# Patient Record
Sex: MALE | Race: WHITE | Employment: OTHER | URBAN - METROPOLITAN AREA
[De-identification: names, ages, dates, MRNs, and addresses within clinical notes are randomized per-mention and may not be internally consistent; named-entity substitution may affect disease eponyms.]

---

## 2017-01-13 RX ORDER — LEVOTHYROXINE SODIUM 112 UG/1
TABLET ORAL
Qty: 90 TABLET | Refills: 3 | Status: SHIPPED | OUTPATIENT
Start: 2017-01-13 | End: 2018-01-12 | Stop reason: SDUPTHER

## 2017-03-06 ENCOUNTER — OFFICE VISIT (OUTPATIENT)
Dept: FAMILY MEDICINE CLINIC | Age: 67
End: 2017-03-06

## 2017-03-06 VITALS
BODY MASS INDEX: 26.88 KG/M2 | WEIGHT: 187.8 LBS | DIASTOLIC BLOOD PRESSURE: 66 MMHG | SYSTOLIC BLOOD PRESSURE: 138 MMHG | HEART RATE: 76 BPM | HEIGHT: 70 IN | TEMPERATURE: 98.8 F | RESPIRATION RATE: 16 BRPM

## 2017-03-06 DIAGNOSIS — M71.121 SEPTIC OLECRANON BURSITIS, RIGHT: Primary | ICD-10-CM

## 2017-03-06 PROCEDURE — G8427 DOCREV CUR MEDS BY ELIG CLIN: HCPCS | Performed by: FAMILY MEDICINE

## 2017-03-06 PROCEDURE — 1123F ACP DISCUSS/DSCN MKR DOCD: CPT | Performed by: FAMILY MEDICINE

## 2017-03-06 PROCEDURE — 4040F PNEUMOC VAC/ADMIN/RCVD: CPT | Performed by: FAMILY MEDICINE

## 2017-03-06 PROCEDURE — 99213 OFFICE O/P EST LOW 20 MIN: CPT | Performed by: FAMILY MEDICINE

## 2017-03-06 PROCEDURE — 1036F TOBACCO NON-USER: CPT | Performed by: FAMILY MEDICINE

## 2017-03-06 PROCEDURE — G8484 FLU IMMUNIZE NO ADMIN: HCPCS | Performed by: FAMILY MEDICINE

## 2017-03-06 PROCEDURE — 3017F COLORECTAL CA SCREEN DOC REV: CPT | Performed by: FAMILY MEDICINE

## 2017-03-06 PROCEDURE — G8420 CALC BMI NORM PARAMETERS: HCPCS | Performed by: FAMILY MEDICINE

## 2017-03-06 RX ORDER — SULFAMETHOXAZOLE AND TRIMETHOPRIM 800; 160 MG/1; MG/1
1 TABLET ORAL 2 TIMES DAILY
COMMUNITY
Start: 2017-02-27 | End: 2017-03-07

## 2017-03-06 RX ORDER — CEPHALEXIN 500 MG/1
500 CAPSULE ORAL 3 TIMES DAILY
COMMUNITY
Start: 2017-02-27 | End: 2017-03-07

## 2017-03-06 RX ORDER — CLINDAMYCIN HYDROCHLORIDE 300 MG/1
300 CAPSULE ORAL 3 TIMES DAILY
Qty: 30 CAPSULE | Refills: 0 | Status: SHIPPED | OUTPATIENT
Start: 2017-03-06 | End: 2017-03-16

## 2017-04-17 RX ORDER — ATORVASTATIN CALCIUM 80 MG/1
TABLET, FILM COATED ORAL
Qty: 90 TABLET | Refills: 3 | Status: SHIPPED | OUTPATIENT
Start: 2017-04-17 | End: 2018-04-19 | Stop reason: SDUPTHER

## 2017-05-23 LAB
ALBUMIN SERPL-MCNC: 3.4 G/DL
ALP BLD-CCNC: 67 U/L
ALT SERPL-CCNC: 38 U/L
AST SERPL-CCNC: 25 U/L
BILIRUB SERPL-MCNC: 0.6 MG/DL (ref 0.1–1.4)
BUN BLDV-MCNC: 15 MG/DL
CALCIUM SERPL-MCNC: 9 MG/DL
CHLORIDE BLD-SCNC: 105 MMOL/L
CO2: 25 MMOL/L
CREAT SERPL-MCNC: 1 MG/DL
GFR CALCULATED: 75
GLUCOSE BLD-MCNC: 79 MG/DL
POTASSIUM SERPL-SCNC: 4.2 MMOL/L
PROSTATE SPECIFIC ANTIGEN: 2.6 NG/ML
SODIUM BLD-SCNC: 140 MMOL/L
TOTAL PROTEIN: 7.8
TSH SERPL DL<=0.05 MIU/L-ACNC: 1.91 UIU/ML

## 2017-07-10 ENCOUNTER — OFFICE VISIT (OUTPATIENT)
Dept: FAMILY MEDICINE CLINIC | Age: 67
End: 2017-07-10

## 2017-07-10 VITALS
HEIGHT: 70 IN | SYSTOLIC BLOOD PRESSURE: 124 MMHG | RESPIRATION RATE: 14 BRPM | WEIGHT: 188 LBS | BODY MASS INDEX: 26.92 KG/M2 | HEART RATE: 66 BPM | TEMPERATURE: 97.6 F | DIASTOLIC BLOOD PRESSURE: 78 MMHG

## 2017-07-10 DIAGNOSIS — E78.2 MIXED HYPERLIPIDEMIA: ICD-10-CM

## 2017-07-10 DIAGNOSIS — R13.14 PHARYNGOESOPHAGEAL DYSPHAGIA: Primary | ICD-10-CM

## 2017-07-10 DIAGNOSIS — L23.9 ALLERGIC DERMATITIS: ICD-10-CM

## 2017-07-10 DIAGNOSIS — E03.9 ACQUIRED HYPOTHYROIDISM: ICD-10-CM

## 2017-07-10 PROCEDURE — 4040F PNEUMOC VAC/ADMIN/RCVD: CPT | Performed by: FAMILY MEDICINE

## 2017-07-10 PROCEDURE — 99213 OFFICE O/P EST LOW 20 MIN: CPT | Performed by: FAMILY MEDICINE

## 2017-07-10 PROCEDURE — 1123F ACP DISCUSS/DSCN MKR DOCD: CPT | Performed by: FAMILY MEDICINE

## 2017-07-10 PROCEDURE — 3017F COLORECTAL CA SCREEN DOC REV: CPT | Performed by: FAMILY MEDICINE

## 2017-07-10 PROCEDURE — 1036F TOBACCO NON-USER: CPT | Performed by: FAMILY MEDICINE

## 2017-07-10 PROCEDURE — G8427 DOCREV CUR MEDS BY ELIG CLIN: HCPCS | Performed by: FAMILY MEDICINE

## 2017-07-10 PROCEDURE — G8419 CALC BMI OUT NRM PARAM NOF/U: HCPCS | Performed by: FAMILY MEDICINE

## 2017-07-10 RX ORDER — TRIAMCINOLONE ACETONIDE 40 MG/ML
80 INJECTION, SUSPENSION INTRA-ARTICULAR; INTRAMUSCULAR ONCE
Status: COMPLETED | OUTPATIENT
Start: 2017-07-10 | End: 2017-07-10

## 2017-07-10 RX ORDER — DESOXIMETASONE 2.5 MG/G
CREAM TOPICAL
Qty: 100 G | Refills: 2 | Status: SHIPPED | OUTPATIENT
Start: 2017-07-10 | End: 2018-03-21 | Stop reason: SDUPTHER

## 2017-07-10 RX ADMIN — TRIAMCINOLONE ACETONIDE 80 MG: 40 INJECTION, SUSPENSION INTRA-ARTICULAR; INTRAMUSCULAR at 09:51

## 2017-07-13 DIAGNOSIS — E78.2 MIXED HYPERLIPIDEMIA: ICD-10-CM

## 2017-07-13 LAB
CHOLESTEROL, TOTAL: 166 MG/DL (ref 0–199)
HDLC SERPL-MCNC: 44 MG/DL (ref 40–59)
LDL CHOLESTEROL CALCULATED: 110 MG/DL (ref 0–129)
TRIGL SERPL-MCNC: 60 MG/DL (ref 0–200)

## 2017-07-21 ENCOUNTER — OFFICE VISIT (OUTPATIENT)
Dept: GASTROENTEROLOGY | Age: 67
End: 2017-07-21

## 2017-07-21 VITALS
OXYGEN SATURATION: 98 % | RESPIRATION RATE: 16 BRPM | HEART RATE: 69 BPM | HEIGHT: 70 IN | WEIGHT: 184.4 LBS | SYSTOLIC BLOOD PRESSURE: 148 MMHG | TEMPERATURE: 97.9 F | BODY MASS INDEX: 26.4 KG/M2 | DIASTOLIC BLOOD PRESSURE: 98 MMHG

## 2017-07-21 DIAGNOSIS — R13.14 PHARYNGOESOPHAGEAL DYSPHAGIA: Primary | ICD-10-CM

## 2017-07-21 DIAGNOSIS — R13.14 PHARYNGOESOPHAGEAL DYSPHAGIA: ICD-10-CM

## 2017-07-21 LAB
BASOPHILS ABSOLUTE: 0 K/UL (ref 0–0.2)
BASOPHILS RELATIVE PERCENT: 0.4 %
EOSINOPHILS ABSOLUTE: 0 K/UL (ref 0–0.7)
EOSINOPHILS RELATIVE PERCENT: 0.6 %
HCT VFR BLD CALC: 44.2 % (ref 42–52)
HEMOGLOBIN: 14.6 G/DL (ref 14–18)
LYMPHOCYTES ABSOLUTE: 1.6 K/UL (ref 1–4.8)
LYMPHOCYTES RELATIVE PERCENT: 21.2 %
MCH RBC QN AUTO: 31.6 PG (ref 27–31.3)
MCHC RBC AUTO-ENTMCNC: 33.1 % (ref 33–37)
MCV RBC AUTO: 95.3 FL (ref 80–100)
MONOCYTES ABSOLUTE: 1.1 K/UL (ref 0.2–0.8)
MONOCYTES RELATIVE PERCENT: 14.5 %
NEUTROPHILS ABSOLUTE: 4.7 K/UL (ref 1.4–6.5)
NEUTROPHILS RELATIVE PERCENT: 63.3 %
PDW BLD-RTO: 14.2 % (ref 11.5–14.5)
PLATELET # BLD: 229 K/UL (ref 130–400)
RBC # BLD: 4.64 M/UL (ref 4.7–6.1)
WBC # BLD: 7.4 K/UL (ref 4.8–10.8)

## 2017-07-21 PROCEDURE — 99204 OFFICE O/P NEW MOD 45 MIN: CPT | Performed by: INTERNAL MEDICINE

## 2017-07-21 PROCEDURE — 4040F PNEUMOC VAC/ADMIN/RCVD: CPT | Performed by: INTERNAL MEDICINE

## 2017-07-21 PROCEDURE — G8427 DOCREV CUR MEDS BY ELIG CLIN: HCPCS | Performed by: INTERNAL MEDICINE

## 2017-07-21 PROCEDURE — G8419 CALC BMI OUT NRM PARAM NOF/U: HCPCS | Performed by: INTERNAL MEDICINE

## 2017-07-21 PROCEDURE — 1123F ACP DISCUSS/DSCN MKR DOCD: CPT | Performed by: INTERNAL MEDICINE

## 2017-07-21 PROCEDURE — 3017F COLORECTAL CA SCREEN DOC REV: CPT | Performed by: INTERNAL MEDICINE

## 2017-07-21 PROCEDURE — 1036F TOBACCO NON-USER: CPT | Performed by: INTERNAL MEDICINE

## 2017-07-21 ASSESSMENT — ENCOUNTER SYMPTOMS
ABDOMINAL PAIN: 0
DIARRHEA: 0
RESPIRATORY NEGATIVE: 1
VOMITING: 0
EYES NEGATIVE: 1
CONSTIPATION: 0
RECTAL PAIN: 0
NAUSEA: 0
ANAL BLEEDING: 0
BLOOD IN STOOL: 0
ABDOMINAL DISTENTION: 0

## 2017-07-26 ENCOUNTER — OUTSIDE SERVICES (OUTPATIENT)
Dept: GASTROENTEROLOGY | Age: 67
End: 2017-07-26

## 2017-07-26 PROCEDURE — 43239 EGD BIOPSY SINGLE/MULTIPLE: CPT | Performed by: INTERNAL MEDICINE

## 2017-09-20 LAB — PATHOLOGY REPORT: NORMAL

## 2018-01-19 ENCOUNTER — NURSE ONLY (OUTPATIENT)
Dept: FAMILY MEDICINE CLINIC | Age: 68
End: 2018-01-19

## 2018-01-19 DIAGNOSIS — Z23 NEED FOR TDAP VACCINATION: Primary | ICD-10-CM

## 2018-01-19 PROCEDURE — 90715 TDAP VACCINE 7 YRS/> IM: CPT | Performed by: FAMILY MEDICINE

## 2018-01-19 PROCEDURE — 90471 IMMUNIZATION ADMIN: CPT | Performed by: FAMILY MEDICINE

## 2018-01-19 ASSESSMENT — PATIENT HEALTH QUESTIONNAIRE - PHQ9
2. FEELING DOWN, DEPRESSED OR HOPELESS: 0
1. LITTLE INTEREST OR PLEASURE IN DOING THINGS: 0
SUM OF ALL RESPONSES TO PHQ9 QUESTIONS 1 & 2: 0
SUM OF ALL RESPONSES TO PHQ QUESTIONS 1-9: 0

## 2018-03-21 ENCOUNTER — OFFICE VISIT (OUTPATIENT)
Dept: FAMILY MEDICINE CLINIC | Age: 68
End: 2018-03-21
Payer: MEDICARE

## 2018-03-21 VITALS
RESPIRATION RATE: 12 BRPM | TEMPERATURE: 97.7 F | HEIGHT: 70 IN | WEIGHT: 176 LBS | SYSTOLIC BLOOD PRESSURE: 126 MMHG | BODY MASS INDEX: 25.2 KG/M2 | HEART RATE: 72 BPM | DIASTOLIC BLOOD PRESSURE: 84 MMHG

## 2018-03-21 DIAGNOSIS — L30.9 DERMATITIS: ICD-10-CM

## 2018-03-21 DIAGNOSIS — M19.022 PRIMARY OSTEOARTHRITIS OF BOTH ELBOWS: Primary | ICD-10-CM

## 2018-03-21 DIAGNOSIS — L85.3 XEROSIS CUTIS: ICD-10-CM

## 2018-03-21 DIAGNOSIS — Q80.9 ICHTHYOSIS: ICD-10-CM

## 2018-03-21 DIAGNOSIS — M19.021 PRIMARY OSTEOARTHRITIS OF BOTH ELBOWS: Primary | ICD-10-CM

## 2018-03-21 PROCEDURE — G8419 CALC BMI OUT NRM PARAM NOF/U: HCPCS | Performed by: FAMILY MEDICINE

## 2018-03-21 PROCEDURE — 4040F PNEUMOC VAC/ADMIN/RCVD: CPT | Performed by: FAMILY MEDICINE

## 2018-03-21 PROCEDURE — 1123F ACP DISCUSS/DSCN MKR DOCD: CPT | Performed by: FAMILY MEDICINE

## 2018-03-21 PROCEDURE — G8427 DOCREV CUR MEDS BY ELIG CLIN: HCPCS | Performed by: FAMILY MEDICINE

## 2018-03-21 PROCEDURE — G8484 FLU IMMUNIZE NO ADMIN: HCPCS | Performed by: FAMILY MEDICINE

## 2018-03-21 PROCEDURE — 1036F TOBACCO NON-USER: CPT | Performed by: FAMILY MEDICINE

## 2018-03-21 PROCEDURE — 3017F COLORECTAL CA SCREEN DOC REV: CPT | Performed by: FAMILY MEDICINE

## 2018-03-21 PROCEDURE — 99213 OFFICE O/P EST LOW 20 MIN: CPT | Performed by: FAMILY MEDICINE

## 2018-03-21 RX ORDER — TRIAMCINOLONE ACETONIDE 40 MG/ML
80 INJECTION, SUSPENSION INTRA-ARTICULAR; INTRAMUSCULAR ONCE
Status: COMPLETED | OUTPATIENT
Start: 2018-03-21 | End: 2018-03-21

## 2018-03-21 RX ORDER — DESOXIMETASONE 2.5 MG/G
CREAM TOPICAL
Qty: 100 G | Refills: 2 | Status: SHIPPED | OUTPATIENT
Start: 2018-03-21 | End: 2018-04-24 | Stop reason: SDUPTHER

## 2018-03-21 RX ADMIN — TRIAMCINOLONE ACETONIDE 80 MG: 40 INJECTION, SUSPENSION INTRA-ARTICULAR; INTRAMUSCULAR at 08:43

## 2018-04-24 DIAGNOSIS — L30.9 DERMATITIS: ICD-10-CM

## 2018-04-24 RX ORDER — DESOXIMETASONE 2.5 MG/G
CREAM TOPICAL
Qty: 100 G | Refills: 2 | Status: SHIPPED | OUTPATIENT
Start: 2018-04-24 | End: 2018-09-28 | Stop reason: SDUPTHER

## 2018-04-24 RX ORDER — ATORVASTATIN CALCIUM 80 MG/1
40 TABLET, FILM COATED ORAL DAILY
Qty: 90 TABLET | Refills: 3 | Status: SHIPPED | OUTPATIENT
Start: 2018-04-24 | End: 2019-02-19 | Stop reason: SDUPTHER

## 2018-04-24 RX ORDER — LEVOTHYROXINE SODIUM 112 UG/1
112 TABLET ORAL DAILY
Qty: 90 TABLET | Refills: 3 | Status: SHIPPED | OUTPATIENT
Start: 2018-04-24

## 2018-05-22 LAB
BASOPHILS ABSOLUTE: 0.05 /ΜL
BASOPHILS RELATIVE PERCENT: 0.9 %
EOSINOPHILS ABSOLUTE: 0.33 /ΜL
EOSINOPHILS RELATIVE PERCENT: 5.6 %
HCT VFR BLD CALC: 41.9 % (ref 41–53)
HEMOGLOBIN: 14 G/DL (ref 13.5–17.5)
LYMPHOCYTES ABSOLUTE: 1.48 /ΜL
LYMPHOCYTES RELATIVE PERCENT: 25.2 %
MCH RBC QN AUTO: 31.7 PG
MCHC RBC AUTO-ENTMCNC: 33.4 G/DL
MCV RBC AUTO: 94.8 FL
MONOCYTES ABSOLUTE: 0.94 /ΜL
MONOCYTES RELATIVE PERCENT: 16 %
NEUTROPHILS ABSOLUTE: 3.08 /ΜL
NEUTROPHILS RELATIVE PERCENT: 52.3 %
PDW BLD-RTO: 14.3 %
PLATELET # BLD: 268 K/ΜL
PMV BLD AUTO: NORMAL FL
RBC # BLD: 4.42 10^6/ΜL
WBC # BLD: 5.88 10^3/ML

## 2018-05-23 LAB
ALBUMIN SERPL-MCNC: 3.4 G/DL
ALP BLD-CCNC: 62 U/L
ALT SERPL-CCNC: 33 U/L
ANION GAP SERPL CALCULATED.3IONS-SCNC: 11 MMOL/L
AST SERPL-CCNC: 26 U/L
BILIRUB SERPL-MCNC: 0.5 MG/DL (ref 0.1–1.4)
BUN BLDV-MCNC: 20 MG/DL
CALCIUM SERPL-MCNC: 8.9 MG/DL
CHLORIDE BLD-SCNC: 107 MMOL/L
CHOLESTEROL, TOTAL: 184 MG/DL
CHOLESTEROL/HDL RATIO: NORMAL
CO2: 26 MMOL/L
CREAT SERPL-MCNC: 0.9 MG/DL
GFR CALCULATED: 84
GLUCOSE BLD-MCNC: 83 MG/DL
HDLC SERPL-MCNC: 43 MG/DL (ref 35–70)
LDL CHOLESTEROL CALCULATED: 118 MG/DL (ref 0–160)
POTASSIUM SERPL-SCNC: 4 MMOL/L
PROSTATE SPECIFIC ANTIGEN: 2.3 NG/ML
SODIUM BLD-SCNC: 140 MMOL/L
TOTAL PROTEIN: 7.6
TRIGL SERPL-MCNC: 85 MG/DL
TSH SERPL DL<=0.05 MIU/L-ACNC: 2.14 UIU/ML
VLDLC SERPL CALC-MCNC: NORMAL MG/DL

## 2018-09-28 ENCOUNTER — OFFICE VISIT (OUTPATIENT)
Dept: FAMILY MEDICINE CLINIC | Age: 68
End: 2018-09-28
Payer: MEDICARE

## 2018-09-28 VITALS
DIASTOLIC BLOOD PRESSURE: 78 MMHG | RESPIRATION RATE: 12 BRPM | SYSTOLIC BLOOD PRESSURE: 136 MMHG | WEIGHT: 185 LBS | HEIGHT: 70 IN | HEART RATE: 72 BPM | TEMPERATURE: 96.5 F | BODY MASS INDEX: 26.48 KG/M2

## 2018-09-28 DIAGNOSIS — R07.89 OTHER CHEST PAIN: ICD-10-CM

## 2018-09-28 DIAGNOSIS — M79.604 BILATERAL LEG AND FOOT PAIN: ICD-10-CM

## 2018-09-28 DIAGNOSIS — R07.89 CHEST TIGHTNESS OR PRESSURE: Primary | ICD-10-CM

## 2018-09-28 DIAGNOSIS — M79.671 BILATERAL LEG AND FOOT PAIN: ICD-10-CM

## 2018-09-28 DIAGNOSIS — M79.605 BILATERAL LEG AND FOOT PAIN: ICD-10-CM

## 2018-09-28 DIAGNOSIS — R94.31 ABNORMAL EKG: ICD-10-CM

## 2018-09-28 DIAGNOSIS — R20.0 NUMBNESS AND TINGLING OF BOTH FEET: ICD-10-CM

## 2018-09-28 DIAGNOSIS — M79.672 BILATERAL LEG AND FOOT PAIN: ICD-10-CM

## 2018-09-28 DIAGNOSIS — L30.9 DERMATITIS: ICD-10-CM

## 2018-09-28 DIAGNOSIS — R20.2 NUMBNESS AND TINGLING OF BOTH FEET: ICD-10-CM

## 2018-09-28 PROCEDURE — 99214 OFFICE O/P EST MOD 30 MIN: CPT | Performed by: FAMILY MEDICINE

## 2018-09-28 PROCEDURE — 1101F PT FALLS ASSESS-DOCD LE1/YR: CPT | Performed by: FAMILY MEDICINE

## 2018-09-28 PROCEDURE — 3017F COLORECTAL CA SCREEN DOC REV: CPT | Performed by: FAMILY MEDICINE

## 2018-09-28 PROCEDURE — G8427 DOCREV CUR MEDS BY ELIG CLIN: HCPCS | Performed by: FAMILY MEDICINE

## 2018-09-28 PROCEDURE — G8419 CALC BMI OUT NRM PARAM NOF/U: HCPCS | Performed by: FAMILY MEDICINE

## 2018-09-28 PROCEDURE — 1036F TOBACCO NON-USER: CPT | Performed by: FAMILY MEDICINE

## 2018-09-28 PROCEDURE — 93000 ELECTROCARDIOGRAM COMPLETE: CPT | Performed by: FAMILY MEDICINE

## 2018-09-28 PROCEDURE — 4040F PNEUMOC VAC/ADMIN/RCVD: CPT | Performed by: FAMILY MEDICINE

## 2018-09-28 PROCEDURE — 1123F ACP DISCUSS/DSCN MKR DOCD: CPT | Performed by: FAMILY MEDICINE

## 2018-09-28 RX ORDER — DESOXIMETASONE 2.5 MG/G
CREAM TOPICAL
Qty: 100 G | Refills: 2 | Status: SHIPPED | OUTPATIENT
Start: 2018-09-28 | End: 2019-10-29

## 2018-09-28 NOTE — PROGRESS NOTES
Subjective:       Chief Complaint   Patient presents with    Results     f/u EKG    Numbness     b/l numbness/tingling/coldness in toes    Chest Pain      Lam Cottrell is a 70 y.o. male who presents today for FU of Results (f/u EKG); Numbness (b/l numbness/tingling/coldness in toes); and Chest Pain  Patient is here to follow up on hisAbnormal EKG which he had done during screening tests. He reports recurrent chest tightness and pain mainly in the central region of the chest which is nonradiating. The tightness is exertional and with occasional shortness of breath. No jaw pain and no pain in the arms. He denies palpitations, no PND and orthopnea. He has not been diaphoretic and not intense chest pain. Patient complains of toes on both feet getting numbness/tingling/coldness. Symptoms began over 1 year ago. He also complains of bilateral leg pain and coldness of his feet. The symptoms are usually worse at rest but does occur after walking a distance.     Past Medical History:   Diagnosis Date    Eczema     Hyperlipidemia     Hypothyroidism      Patient Active Problem List    Diagnosis Date Noted    Pharyngoesophageal dysphagia     Spondylolisthesis at L5-S1 level 09/09/2013    Degenerative arthritis of lumbar spine 09/09/2013    Basal cell papilloma 08/27/2013    RLS (restless legs syndrome) 05/14/2013    Eczema 03/18/2013    Lipoma 10/23/2012    Hypothyroidism     Hyperlipidemia      Past Surgical History:   Procedure Laterality Date    COLONOSCOPY  10/01/14    DR. Monserrat Little KNEE SURGERY      left    VASECTOMY       Family History   Problem Relation Age of Onset    High Blood Pressure Mother     High Cholesterol Father     Other Father         thyroid    Other Sister         thyroid    Other Brother         throid     Social History     Social History    Marital status:      Spouse name: N/A    Number of children: N/A    Years of education: N/A     Occupational History on exertion  Gastrointestinal ROS: no abdominal pain, change in bowel habits, or black or bloody stools  Genito-Urinary ROS: no dysuria, trouble voiding, or hematuria  Musculoskeletal ROS: negative for - joint pain, joint stiffness, joint swelling, muscle pain or muscular weakness  Neurological ROS: negative for - dizziness, gait disturbance, headaches, impaired coordination/balance, tremors or visual changes  Dermatological ROS: negative for - dry skin, lumps, pruritus or rash        Objective:   Blood pressure 136/78, pulse 72, temperature 96.5 °F (35.8 °C), temperature source Temporal, resp. rate 12, height 5' 10\" (1.778 m), weight 185 lb (83.9 kg). Physical Examination: General appearance - alert, well appearing, and in no distress  Mental status - alert, oriented to person, place, and time  Eyes - pupils equal and reactive, extraocular eye movements intact  Ears - bilateral TM's and external ear canals normal  Mouth - mucous membranes moist, pharynx normal without lesions  Neck - supple, no significant adenopathy  Lymphatics - no palpable lymphadenopathy, no hepatosplenomegaly  Chest - clear to auscultation, no wheezes, rales or rhonchi, symmetric air entry  Heart - normal rate, regular rhythm, normal S1, S2, no murmurs, rubs, clicks or gallops  Abdomen - soft, nontender, nondistended, no masses or organomegaly  Neurological - alert, oriented, normal speech, no focal findings or movement disorder noted  Musculoskeletal - no joint tenderness, deformity or swelling  Extremities: peripheral pulses normal, no pedal edema, no clubbing or cyanosis. ECG: normal sinus rhythm and inferior Q-wave(s)     Assessment / Plan:     Encounter Diagnoses   Name Primary?     Dermatitis     Chest tightness or pressure Yes    Other chest pain     Abnormal EKG     Numbness and tingling of both feet     Bilateral leg and foot pain        Orders Placed This Encounter   Procedures    NM MYOCARDIAL SPECT REST EXERCISE OR RX

## 2018-09-30 ASSESSMENT — PATIENT HEALTH QUESTIONNAIRE - PHQ9
SUM OF ALL RESPONSES TO PHQ9 QUESTIONS 1 & 2: 0
SUM OF ALL RESPONSES TO PHQ QUESTIONS 1-9: 0
2. FEELING DOWN, DEPRESSED OR HOPELESS: 0
1. LITTLE INTEREST OR PLEASURE IN DOING THINGS: 0
SUM OF ALL RESPONSES TO PHQ QUESTIONS 1-9: 0

## 2018-10-01 ENCOUNTER — HOSPITAL ENCOUNTER (OUTPATIENT)
Dept: NON INVASIVE DIAGNOSTICS | Age: 68
Discharge: HOME | DRG: 272 | End: 2018-10-01
Payer: MEDICARE

## 2018-10-01 ENCOUNTER — HOSPITAL ENCOUNTER (OUTPATIENT)
Dept: NUCLEAR MEDICINE | Age: 68
Discharge: HOME | DRG: 272 | End: 2018-10-03
Payer: MEDICARE

## 2018-10-01 VITALS — DIASTOLIC BLOOD PRESSURE: 85 MMHG | SYSTOLIC BLOOD PRESSURE: 142 MMHG | HEART RATE: 86 BPM

## 2018-10-01 DIAGNOSIS — R07.89 CHEST TIGHTNESS OR PRESSURE: ICD-10-CM

## 2018-10-01 DIAGNOSIS — R07.89 OTHER CHEST PAIN: ICD-10-CM

## 2018-10-01 DIAGNOSIS — R94.31 ABNORMAL EKG: ICD-10-CM

## 2018-10-01 PROCEDURE — 93017 CV STRESS TEST TRACING ONLY: CPT

## 2018-10-01 PROCEDURE — 78452 HT MUSCLE IMAGE SPECT MULT: CPT

## 2018-10-01 PROCEDURE — A9502 TC99M TETROFOSMIN: HCPCS | Performed by: FAMILY MEDICINE

## 2018-10-01 PROCEDURE — 2580000003 HC RX 258: Performed by: FAMILY MEDICINE

## 2018-10-01 PROCEDURE — 3430000000 HC RX DIAGNOSTIC RADIOPHARMACEUTICAL: Performed by: FAMILY MEDICINE

## 2018-10-01 RX ORDER — SODIUM CHLORIDE 0.9 % (FLUSH) 0.9 %
10 SYRINGE (ML) INJECTION PRN
Status: DISCONTINUED | OUTPATIENT
Start: 2018-10-01 | End: 2018-10-04 | Stop reason: HOSPADM

## 2018-10-01 RX ADMIN — TETROFOSMIN 29.2 MILLICURIE: 0.23 INJECTION, POWDER, LYOPHILIZED, FOR SOLUTION INTRAVENOUS at 11:09

## 2018-10-01 RX ADMIN — Medication 10 ML: at 11:10

## 2018-10-01 RX ADMIN — TETROFOSMIN 9.7 MILLICURIE: 0.23 INJECTION, POWDER, LYOPHILIZED, FOR SOLUTION INTRAVENOUS at 08:20

## 2018-10-01 RX ADMIN — Medication 10 ML: at 08:21

## 2018-10-02 ENCOUNTER — HOSPITAL ENCOUNTER (OUTPATIENT)
Dept: ULTRASOUND IMAGING | Age: 68
Discharge: HOME | DRG: 272 | End: 2018-10-04
Payer: MEDICARE

## 2018-10-02 DIAGNOSIS — M79.605 BILATERAL LEG AND FOOT PAIN: ICD-10-CM

## 2018-10-02 DIAGNOSIS — M79.604 BILATERAL LEG AND FOOT PAIN: ICD-10-CM

## 2018-10-02 DIAGNOSIS — M79.671 BILATERAL LEG AND FOOT PAIN: ICD-10-CM

## 2018-10-02 DIAGNOSIS — M79.672 BILATERAL LEG AND FOOT PAIN: ICD-10-CM

## 2018-10-02 DIAGNOSIS — R20.2 NUMBNESS AND TINGLING OF BOTH FEET: ICD-10-CM

## 2018-10-02 DIAGNOSIS — R20.0 NUMBNESS AND TINGLING OF BOTH FEET: ICD-10-CM

## 2018-10-02 LAB
FOLATE: 11.9 NG/ML (ref 7.3–26.1)
VITAMIN B-12: 450 PG/ML (ref 232–1245)

## 2018-10-02 PROCEDURE — 93924 LWR XTR VASC STDY BILAT: CPT

## 2018-10-03 ENCOUNTER — OFFICE VISIT (OUTPATIENT)
Dept: CARDIOLOGY CLINIC | Age: 68
End: 2018-10-03
Payer: MEDICARE

## 2018-10-03 VITALS
WEIGHT: 188 LBS | OXYGEN SATURATION: 98 % | HEIGHT: 70 IN | HEART RATE: 75 BPM | BODY MASS INDEX: 26.92 KG/M2 | SYSTOLIC BLOOD PRESSURE: 130 MMHG | DIASTOLIC BLOOD PRESSURE: 80 MMHG

## 2018-10-03 DIAGNOSIS — R94.39 ABNORMAL CARDIOVASCULAR STRESS TEST: Primary | ICD-10-CM

## 2018-10-03 DIAGNOSIS — I20.9 ANGINA, CLASS III (HCC): ICD-10-CM

## 2018-10-03 DIAGNOSIS — I10 ESSENTIAL HYPERTENSION: ICD-10-CM

## 2018-10-03 DIAGNOSIS — E78.2 MIXED HYPERLIPIDEMIA: ICD-10-CM

## 2018-10-03 PROCEDURE — G8419 CALC BMI OUT NRM PARAM NOF/U: HCPCS | Performed by: INTERNAL MEDICINE

## 2018-10-03 PROCEDURE — 1123F ACP DISCUSS/DSCN MKR DOCD: CPT | Performed by: INTERNAL MEDICINE

## 2018-10-03 PROCEDURE — 99204 OFFICE O/P NEW MOD 45 MIN: CPT | Performed by: INTERNAL MEDICINE

## 2018-10-03 PROCEDURE — 1036F TOBACCO NON-USER: CPT | Performed by: INTERNAL MEDICINE

## 2018-10-03 PROCEDURE — G8484 FLU IMMUNIZE NO ADMIN: HCPCS | Performed by: INTERNAL MEDICINE

## 2018-10-03 PROCEDURE — 3017F COLORECTAL CA SCREEN DOC REV: CPT | Performed by: INTERNAL MEDICINE

## 2018-10-03 PROCEDURE — 4040F PNEUMOC VAC/ADMIN/RCVD: CPT | Performed by: INTERNAL MEDICINE

## 2018-10-03 PROCEDURE — G8427 DOCREV CUR MEDS BY ELIG CLIN: HCPCS | Performed by: INTERNAL MEDICINE

## 2018-10-03 PROCEDURE — 1101F PT FALLS ASSESS-DOCD LE1/YR: CPT | Performed by: INTERNAL MEDICINE

## 2018-10-03 PROCEDURE — G8598 ASA/ANTIPLAT THER USED: HCPCS | Performed by: INTERNAL MEDICINE

## 2018-10-03 RX ORDER — ISOSORBIDE MONONITRATE 30 MG/1
30 TABLET, EXTENDED RELEASE ORAL DAILY
Qty: 30 TABLET | Refills: 3 | Status: SHIPPED | OUTPATIENT
Start: 2018-10-03 | End: 2019-02-19

## 2018-10-03 ASSESSMENT — ENCOUNTER SYMPTOMS
CHOKING: 0
COUGH: 0
EYE DISCHARGE: 0
SHORTNESS OF BREATH: 1
ABDOMINAL PAIN: 0
EYE PAIN: 0
CHEST TIGHTNESS: 0
WHEEZING: 0
STRIDOR: 0
NAUSEA: 0
APNEA: 0
COLOR CHANGE: 0
TROUBLE SWALLOWING: 0
ABDOMINAL DISTENTION: 0

## 2018-10-03 NOTE — PROGRESS NOTES
[facility] CARDIOLOGY OFFICE CONSULT      Patient: Adriana Maier  YOB: 1950  MRN: [id number]    Chief Complaint:  Chief Complaint   Patient presents with   Yuridia Johnson Cardiologist     DR Dayton Campos REFERRING    Abnormal Test Results     STRESS TEST        Subjective/HPI    The patient presents for evaluation as a referral from Dr. Beto Montana. The patient has been having symptoms of chest pain/arm pain. Prior to this visit the patient has had no cardiac history. Prior to this visit the patient has been evaluated with stress test showing inferior ischemia. The patient reports having pain at rest for 30 minutes at times. None today. Also fatigue and leg weakness.            Past Medical History:   Diagnosis Date    Eczema     Hyperlipidemia     Hypothyroidism        Past Surgical History:   Procedure Laterality Date    COLONOSCOPY  10/01/14    DR. Rocha Redemperatriz KNEE SURGERY      left    VASECTOMY         Family History   Problem Relation Age of Onset    High Blood Pressure Mother     High Cholesterol Father     Other Father         thyroid    Other Sister         thyroid    Other Brother         throid       Social History     Social History    Marital status:      Spouse name: N/A    Number of children: N/A    Years of education: N/A     Occupational History    Electical       [employer]     Social History Main Topics    Smoking status: Never Smoker    Smokeless tobacco: Never Used    Alcohol use Yes     2 Glasses of wine, 2 Cans of beer per week      Comment: rare    Drug use: No    Sexual activity: Not on file     Other Topics Concern    Not on file     Social History Narrative    No narrative on file       No Known Allergies    Current Outpatient Prescriptions   Medication Sig Dispense Refill    aspirin 81 MG tablet Take 1 tablet by mouth daily With Food 30 tablet 3    metoprolol tartrate (LOPRESSOR) 25 MG tablet Take 1 tablet by mouth 2 times daily 60 tablet

## 2018-10-04 ENCOUNTER — HOSPITAL ENCOUNTER (INPATIENT)
Dept: CARDIAC CATH/INVASIVE PROCEDURES | Age: 68
LOS: 1 days | Discharge: ANOTHER HOSPITAL | DRG: 272 | End: 2018-10-05
Attending: INTERNAL MEDICINE | Admitting: INTERNAL MEDICINE
Payer: MEDICARE

## 2018-10-04 PROBLEM — I20.9 ANGINA, CLASS IV (HCC): Status: ACTIVE | Noted: 2018-10-04

## 2018-10-04 LAB
ANION GAP SERPL CALCULATED.3IONS-SCNC: 11 MEQ/L (ref 7–13)
BUN BLDV-MCNC: 13 MG/DL (ref 8–23)
CALCIUM SERPL-MCNC: 8.9 MG/DL (ref 8.6–10.2)
CHLORIDE BLD-SCNC: 102 MEQ/L (ref 98–107)
CO2: 22 MEQ/L (ref 22–29)
CREAT SERPL-MCNC: 0.79 MG/DL (ref 0.7–1.2)
GFR AFRICAN AMERICAN: >60
GFR NON-AFRICAN AMERICAN: >60
GLUCOSE BLD-MCNC: 92 MG/DL (ref 74–109)
GLUCOSE FASTING: 92 MG/DL
HCT VFR BLD CALC: 41.3 % (ref 42–52)
HEMOGLOBIN: 14 G/DL (ref 14–18)
MCH RBC QN AUTO: 32.5 PG (ref 27–31.3)
MCHC RBC AUTO-ENTMCNC: 33.9 % (ref 33–37)
MCV RBC AUTO: 96 FL (ref 80–100)
PDW BLD-RTO: 13.8 % (ref 11.5–14.5)
PLATELET # BLD: 241 K/UL (ref 130–400)
POTASSIUM REFLEX MAGNESIUM: 4.2 MEQ/L (ref 3.5–5.1)
RBC # BLD: 4.3 M/UL (ref 4.7–6.1)
SODIUM BLD-SCNC: 135 MEQ/L (ref 132–144)
WBC # BLD: 8.3 K/UL (ref 4.8–10.8)

## 2018-10-04 PROCEDURE — 2580000003 HC RX 258: Performed by: INTERNAL MEDICINE

## 2018-10-04 PROCEDURE — 93458 L HRT ARTERY/VENTRICLE ANGIO: CPT | Performed by: INTERNAL MEDICINE

## 2018-10-04 PROCEDURE — 33967 INSERT I-AORT PERCUT DEVICE: CPT | Performed by: INTERNAL MEDICINE

## 2018-10-04 PROCEDURE — 4A023N7 MEASUREMENT OF CARDIAC SAMPLING AND PRESSURE, LEFT HEART, PERCUTANEOUS APPROACH: ICD-10-PCS | Performed by: INTERNAL MEDICINE

## 2018-10-04 PROCEDURE — C1894 INTRO/SHEATH, NON-LASER: HCPCS

## 2018-10-04 PROCEDURE — B2111ZZ FLUOROSCOPY OF MULTIPLE CORONARY ARTERIES USING LOW OSMOLAR CONTRAST: ICD-10-PCS | Performed by: INTERNAL MEDICINE

## 2018-10-04 PROCEDURE — 2500000003 HC RX 250 WO HCPCS

## 2018-10-04 PROCEDURE — C1769 GUIDE WIRE: HCPCS

## 2018-10-04 PROCEDURE — 6360000002 HC RX W HCPCS

## 2018-10-04 PROCEDURE — 6370000000 HC RX 637 (ALT 250 FOR IP): Performed by: INTERNAL MEDICINE

## 2018-10-04 PROCEDURE — 2709999900 HC NON-CHARGEABLE SUPPLY

## 2018-10-04 PROCEDURE — C1887 CATHETER, GUIDING: HCPCS

## 2018-10-04 PROCEDURE — 85027 COMPLETE CBC AUTOMATED: CPT

## 2018-10-04 PROCEDURE — B2131ZZ FLUOROSCOPY OF MULTIPLE CORONARY ARTERY BYPASS GRAFTS USING LOW OSMOLAR CONTRAST: ICD-10-PCS | Performed by: INTERNAL MEDICINE

## 2018-10-04 PROCEDURE — 2000000000 HC ICU R&B

## 2018-10-04 PROCEDURE — 5A02210 ASSISTANCE WITH CARDIAC OUTPUT USING BALLOON PUMP, CONTINUOUS: ICD-10-PCS | Performed by: INTERNAL MEDICINE

## 2018-10-04 PROCEDURE — 2580000003 HC RX 258

## 2018-10-04 PROCEDURE — 2780000010 HC IMPLANT OTHER

## 2018-10-04 PROCEDURE — B2151ZZ FLUOROSCOPY OF LEFT HEART USING LOW OSMOLAR CONTRAST: ICD-10-PCS | Performed by: INTERNAL MEDICINE

## 2018-10-04 PROCEDURE — 6360000002 HC RX W HCPCS: Performed by: INTERNAL MEDICINE

## 2018-10-04 PROCEDURE — 80048 BASIC METABOLIC PNL TOTAL CA: CPT

## 2018-10-04 RX ORDER — NITROGLYCERIN 0.4 MG/1
0.4 TABLET SUBLINGUAL EVERY 5 MIN PRN
Status: DISCONTINUED | OUTPATIENT
Start: 2018-10-04 | End: 2018-10-05 | Stop reason: HOSPADM

## 2018-10-04 RX ORDER — MORPHINE SULFATE 2 MG/ML
2 INJECTION, SOLUTION INTRAMUSCULAR; INTRAVENOUS
Status: DISCONTINUED | OUTPATIENT
Start: 2018-10-04 | End: 2018-10-05 | Stop reason: HOSPADM

## 2018-10-04 RX ORDER — MORPHINE SULFATE 4 MG/ML
4 INJECTION, SOLUTION INTRAMUSCULAR; INTRAVENOUS EVERY 4 HOURS PRN
Status: DISCONTINUED | OUTPATIENT
Start: 2018-10-04 | End: 2018-10-05 | Stop reason: HOSPADM

## 2018-10-04 RX ORDER — NITROGLYCERIN 20 MG/100ML
INJECTION INTRAVENOUS
Status: COMPLETED
Start: 2018-10-04 | End: 2018-10-04

## 2018-10-04 RX ORDER — ATORVASTATIN CALCIUM 40 MG/1
40 TABLET, FILM COATED ORAL DAILY
Status: DISCONTINUED | OUTPATIENT
Start: 2018-10-04 | End: 2018-10-05 | Stop reason: HOSPADM

## 2018-10-04 RX ORDER — SODIUM CHLORIDE 9 MG/ML
INJECTION, SOLUTION INTRAVENOUS CONTINUOUS
Status: DISPENSED | OUTPATIENT
Start: 2018-10-04 | End: 2018-10-05

## 2018-10-04 RX ORDER — ASPIRIN 81 MG/1
81 TABLET, CHEWABLE ORAL ONCE
Status: DISCONTINUED | OUTPATIENT
Start: 2018-10-04 | End: 2018-10-05 | Stop reason: HOSPADM

## 2018-10-04 RX ORDER — NITROGLYCERIN 20 MG/100ML
5 INJECTION INTRAVENOUS CONTINUOUS
Status: DISCONTINUED | OUTPATIENT
Start: 2018-10-04 | End: 2018-10-05 | Stop reason: HOSPADM

## 2018-10-04 RX ORDER — LEVOTHYROXINE SODIUM 112 UG/1
112 TABLET ORAL DAILY
Status: DISCONTINUED | OUTPATIENT
Start: 2018-10-04 | End: 2018-10-05 | Stop reason: HOSPADM

## 2018-10-04 RX ORDER — ACETAMINOPHEN 325 MG/1
650 TABLET ORAL EVERY 4 HOURS PRN
Status: DISCONTINUED | OUTPATIENT
Start: 2018-10-04 | End: 2018-10-05 | Stop reason: HOSPADM

## 2018-10-04 RX ORDER — SODIUM CHLORIDE 450 MG/100ML
INJECTION, SOLUTION INTRAVENOUS CONTINUOUS
Status: DISCONTINUED | OUTPATIENT
Start: 2018-10-04 | End: 2018-10-04

## 2018-10-04 RX ORDER — ASPIRIN 81 MG/1
81 TABLET, CHEWABLE ORAL DAILY
Status: DISCONTINUED | OUTPATIENT
Start: 2018-10-04 | End: 2018-10-05 | Stop reason: HOSPADM

## 2018-10-04 RX ORDER — ALPRAZOLAM 0.5 MG/1
0.5 TABLET ORAL
Status: ACTIVE | OUTPATIENT
Start: 2018-10-04 | End: 2018-10-04

## 2018-10-04 RX ADMIN — ATORVASTATIN CALCIUM 40 MG: 40 TABLET, FILM COATED ORAL at 21:03

## 2018-10-04 RX ADMIN — SODIUM CHLORIDE: 4.5 INJECTION, SOLUTION INTRAVENOUS at 08:56

## 2018-10-04 RX ADMIN — ACETAMINOPHEN 650 MG: 325 TABLET ORAL at 21:41

## 2018-10-04 RX ADMIN — METOPROLOL TARTRATE 25 MG: 25 TABLET ORAL at 21:03

## 2018-10-04 RX ADMIN — SODIUM CHLORIDE: 9 INJECTION, SOLUTION INTRAVENOUS at 22:39

## 2018-10-04 RX ADMIN — NITROGLYCERIN 10 MCG/MIN: 20 INJECTION INTRAVENOUS at 22:40

## 2018-10-04 RX ADMIN — MORPHINE SULFATE 2 MG: 2 INJECTION, SOLUTION INTRAMUSCULAR; INTRAVENOUS at 23:46

## 2018-10-04 RX ADMIN — ASPIRIN 81 MG 81 MG: 81 TABLET ORAL at 16:39

## 2018-10-04 ASSESSMENT — PAIN SCALES - GENERAL
PAINLEVEL_OUTOF10: 3
PAINLEVEL_OUTOF10: 8

## 2018-10-05 ENCOUNTER — APPOINTMENT (OUTPATIENT)
Dept: GENERAL RADIOLOGY | Age: 68
DRG: 272 | End: 2018-10-05
Attending: INTERNAL MEDICINE
Payer: MEDICARE

## 2018-10-05 VITALS
TEMPERATURE: 98.7 F | WEIGHT: 185 LBS | BODY MASS INDEX: 26.48 KG/M2 | HEART RATE: 65 BPM | RESPIRATION RATE: 13 BRPM | HEIGHT: 70 IN | DIASTOLIC BLOOD PRESSURE: 61 MMHG | SYSTOLIC BLOOD PRESSURE: 195 MMHG | OXYGEN SATURATION: 98 %

## 2018-10-05 LAB
ANA BY IFA: ABNORMAL
ANA IGG, ELISA: DETECTED

## 2018-10-05 PROCEDURE — 6360000002 HC RX W HCPCS: Performed by: INTERNAL MEDICINE

## 2018-10-05 RX ADMIN — MORPHINE SULFATE 2 MG: 2 INJECTION, SOLUTION INTRAMUSCULAR; INTRAVENOUS at 03:34

## 2018-10-05 ASSESSMENT — PAIN SCALES - GENERAL
PAINLEVEL_OUTOF10: 8
PAINLEVEL_OUTOF10: 2

## 2018-10-06 LAB
DOUBLE STRANDED DNA AB, IGG: NORMAL
ENA TO RNP ANTIBODY: 0 AU/ML (ref 0–40)
ENA TO SMITH (SM) ANTIBODY: 0 AU/ML (ref 0–40)
ENA TO SSB (LA) ANTIBODY: 0 AU/ML (ref 0–40)
SSA 52 (RO) (ENA) AB, IGG: 4 AU/ML (ref 0–40)
SSA 60 (RO) (ENA) AB, IGG: 3 AU/ML (ref 0–40)

## 2018-10-08 NOTE — PROCEDURES
hemostasis by D-Stat radial.  An  8-Indonesian 50 mL Tobi Sensation intraaortic balloon pump was placed in  the aorta with no complication and augmented 1 to 1. The patient was  stable and transferred to the ICU awaiting CABG. FINDINGS:  LMT:  Left main trunk is large in caliber but demonstrates severe  stenosis. There is an ostial 50% followed by distal 70%. The left  main trunk bifurcates into a large left anterior descending and left  circumflex coronary artery. LAD:  Left anterior descending artery has 80% ostial stenosis. It is  large in caliber and beyond the ostial stenosis has only mild disease. Has 1 diagonal that is large and 1 diagonal that is medium sized. The  medium-sized first diagonal has severe ostial stenosis. The second  diagonal has moderate-to-severe ostial stenosis. LCX:  Left circumflex coronary artery is large in caliber,  nondominant, has significant proximal disease of 40% to 50% diffuse. Mid circumflex at the junction of a large second obtuse marginal has  99% stenosis. The remainder of the AV groove circumflex is small. RCA:  Right coronary artery is large in caliber, dominant, has large  PDA and posterolateral ventricular branches. The right coronary  artery is diffusely diseased with severe subtotal 99% proximal and mid  stenosis. There is a hint of some antegrade flow, although the right  coronary artery mainly fills via collaterals from the left, mainly  septals. HEMODYNAMICS:  Left ventricular end-diastolic pressure is 9 with no  gradient across the aortic valve. LEFT VENTRICULOGRAPHY:  Ejection fraction is 50% with inferior  hypokinesis. CONCLUSIONS:  Severe left main stenosis as well as 3-vessel coronary  artery disease with a functionally occluded right coronary artery. Given rest angina for 30 minutes last night and severe left main with  occluded right, I placed an intraaortic balloon pump.   The patient  will need transfer to tertiary center for

## 2018-10-17 ENCOUNTER — TELEPHONE (OUTPATIENT)
Dept: FAMILY MEDICINE CLINIC | Age: 68
End: 2018-10-17

## 2018-10-17 NOTE — TELEPHONE ENCOUNTER
Noted.  [id number] Fallon Hopkins for [facility] AT [facility] and PT

## 2018-10-23 ENCOUNTER — TELEPHONE (OUTPATIENT)
Dept: FAMILY MEDICINE CLINIC | Age: 68
End: 2018-10-23

## 2018-10-23 LAB
ANISOCYTOSIS: ABNORMAL
BANDED NEUTROPHILS RELATIVE PERCENT: 1 %
BASOPHILS ABSOLUTE: 0.1 K/UL (ref 0–0.2)
BASOPHILS RELATIVE PERCENT: 1 %
CREAT SERPL-MCNC: 0.83 MG/DL (ref 0.7–1.2)
EOSINOPHILS ABSOLUTE: 0.3 K/UL (ref 0–0.7)
EOSINOPHILS RELATIVE PERCENT: 3 %
GFR AFRICAN AMERICAN: >60
GFR NON-AFRICAN AMERICAN: >60
HCT VFR BLD CALC: 35.1 % (ref 42–52)
HEMOGLOBIN: 11.7 G/DL (ref 14–18)
HOWELL-JOLLY BODIES: ABNORMAL
LYMPHOCYTES ABSOLUTE: 1.2 K/UL (ref 1–4.8)
LYMPHOCYTES RELATIVE PERCENT: 13 %
MACROCYTES: ABNORMAL
MCH RBC QN AUTO: 32.9 PG (ref 27–31.3)
MCHC RBC AUTO-ENTMCNC: 33.5 % (ref 33–37)
MCV RBC AUTO: 98.4 FL (ref 80–100)
MONOCYTES ABSOLUTE: 1 K/UL (ref 0.2–0.8)
MONOCYTES RELATIVE PERCENT: 9.6 %
NEUTROPHILS ABSOLUTE: 7.1 K/UL (ref 1.4–6.5)
NEUTROPHILS RELATIVE PERCENT: 73 %
PDW BLD-RTO: 15 % (ref 11.5–14.5)
PLATELET # BLD: 680 K/UL (ref 130–400)
PLATELET SLIDE REVIEW: ABNORMAL
RBC # BLD: 3.56 M/UL (ref 4.7–6.1)
VANCOMYCIN TROUGH: 17.8 UG/ML (ref 10–20)
WBC # BLD: 9.6 K/UL (ref 4.8–10.8)

## 2018-11-08 ENCOUNTER — PATIENT MESSAGE (OUTPATIENT)
Dept: FAMILY MEDICINE CLINIC | Age: 68
End: 2018-11-08

## 2018-11-08 NOTE — TELEPHONE ENCOUNTER
From: Randy Dwyer  To: Sheldon Terrell MD  Sent: 11/8/2018 10:51 AM EST  Subject: Visit Fani Garcia. I am sure your are aware of my heart bypass surgery at [facility]. 5 bypasses on Oct. 11. Released to home on Oct. 16. Blood infection while at hospital required vacomycin for treatment, which continued at home for a week after release. Blood infection delayed surgery for a couple days. Recovery is going quite well. Walking every day. 10# lift/push/pull limit for six weeks. I am looking forward to driving again after six weeks. Next appointment is Nov 26 at [facility]. Cardiac Rehab begins Nov 27, at [facility] [place], running for 12 weeks. Do you need to see me?   Thank you,  Sage Cristobal

## 2018-11-13 ENCOUNTER — OFFICE VISIT (OUTPATIENT)
Dept: FAMILY MEDICINE CLINIC | Age: 68
End: 2018-11-13
Payer: MEDICARE

## 2018-11-13 VITALS
HEIGHT: 70 IN | SYSTOLIC BLOOD PRESSURE: 112 MMHG | BODY MASS INDEX: 26.48 KG/M2 | WEIGHT: 185 LBS | TEMPERATURE: 97.6 F | HEART RATE: 74 BPM | RESPIRATION RATE: 14 BRPM | DIASTOLIC BLOOD PRESSURE: 72 MMHG

## 2018-11-13 DIAGNOSIS — E78.2 MIXED HYPERLIPIDEMIA: ICD-10-CM

## 2018-11-13 DIAGNOSIS — E03.9 ACQUIRED HYPOTHYROIDISM: ICD-10-CM

## 2018-11-13 DIAGNOSIS — Z09 HOSPITAL DISCHARGE FOLLOW-UP: Primary | ICD-10-CM

## 2018-11-13 DIAGNOSIS — Z95.1 S/P CABG (CORONARY ARTERY BYPASS GRAFT): ICD-10-CM

## 2018-11-13 DIAGNOSIS — I25.10 CORONARY ARTERY DISEASE INVOLVING NATIVE CORONARY ARTERY OF NATIVE HEART WITHOUT ANGINA PECTORIS: ICD-10-CM

## 2018-11-13 PROCEDURE — 3017F COLORECTAL CA SCREEN DOC REV: CPT | Performed by: FAMILY MEDICINE

## 2018-11-13 PROCEDURE — 1123F ACP DISCUSS/DSCN MKR DOCD: CPT | Performed by: FAMILY MEDICINE

## 2018-11-13 PROCEDURE — G8419 CALC BMI OUT NRM PARAM NOF/U: HCPCS | Performed by: FAMILY MEDICINE

## 2018-11-13 PROCEDURE — 99214 OFFICE O/P EST MOD 30 MIN: CPT | Performed by: FAMILY MEDICINE

## 2018-11-13 PROCEDURE — G8598 ASA/ANTIPLAT THER USED: HCPCS | Performed by: FAMILY MEDICINE

## 2018-11-13 PROCEDURE — G8427 DOCREV CUR MEDS BY ELIG CLIN: HCPCS | Performed by: FAMILY MEDICINE

## 2018-11-13 PROCEDURE — 4040F PNEUMOC VAC/ADMIN/RCVD: CPT | Performed by: FAMILY MEDICINE

## 2018-11-13 PROCEDURE — 1101F PT FALLS ASSESS-DOCD LE1/YR: CPT | Performed by: FAMILY MEDICINE

## 2018-11-13 PROCEDURE — G8484 FLU IMMUNIZE NO ADMIN: HCPCS | Performed by: FAMILY MEDICINE

## 2018-11-13 PROCEDURE — 1036F TOBACCO NON-USER: CPT | Performed by: FAMILY MEDICINE

## 2018-11-13 NOTE — PROGRESS NOTES
- General ROS: negative for - fatigue, fever, malaise, night sweats, sleep disturbance or weight loss  Psychological ROS: negative for - anxiety, concentration difficulties, depression, memory difficulties or sleep disturbances  ENT ROS: negative for - hearing change, nasal discharge, oral lesions, sinus pain, sore throat, tinnitus or vertigo  Allergy and Immunology ROS: negative for - hives, nasal congestion or seasonal allergies  Hematological and Lymphatic ROS: negative for - bruising, fatigue, night sweats or pallor  Endocrine ROS: negative for - hot flashes, malaise/lethargy, palpitations, polydipsia/polyuria, skin changes, temperature intolerance or unexpected weight changes  Respiratory ROS: negative for - cough, hemoptysis, pleuritic pain, shortness of breath or wheezing  Cardiovascular ROS: no chest pain or dyspnea on exertion  Gastrointestinal ROS: no abdominal pain, change in bowel habits, or black or bloody stools  Genito-Urinary ROS: no dysuria, trouble voiding, or hematuria  Musculoskeletal ROS: negative for - joint pain, joint stiffness, joint swelling, muscle pain or muscular weakness  Neurological ROS: negative for - dizziness, gait disturbance, headaches, impaired coordination/balance, numbness/tingling, tremors or visual changes  Dermatological ROS: negative for - dry skin, lumps, pruritus or rash     Blood pressure 112/72, pulse 74, temperature 97.6 °F (36.4 °C), temperature source Temporal, resp. rate 14, height 5' 10\" (1.778 m), weight 185 lb (83.9 kg).     Physical Examination: General appearance - alert, well appearing, and in no distress  Mental status - alert, oriented to person, place, and time  Eyes - pupils equal and reactive, extraocular eye movements intact  Ears - bilateral TM's and external ear canals normal  Mouth - mucous membranes moist, pharynx normal without lesions  Neck - supple, no significant adenopathy  Lymphatics - no palpable lymphadenopathy, no hepatosplenomegaly  Chest -

## 2018-11-27 ENCOUNTER — HOSPITAL ENCOUNTER (OUTPATIENT)
Dept: CARDIAC REHAB | Age: 68
Setting detail: THERAPIES SERIES
Discharge: HOME | End: 2018-11-27
Payer: MEDICARE

## 2018-11-27 PROCEDURE — 93798 PHYS/QHP OP CAR RHAB W/ECG: CPT

## 2018-11-28 ENCOUNTER — HOSPITAL ENCOUNTER (OUTPATIENT)
Dept: CARDIAC REHAB | Age: 68
Setting detail: THERAPIES SERIES
Discharge: HOME | End: 2018-11-28
Payer: MEDICARE

## 2018-11-30 ENCOUNTER — HOSPITAL ENCOUNTER (OUTPATIENT)
Dept: CARDIAC REHAB | Age: 68
Setting detail: THERAPIES SERIES
Discharge: HOME | End: 2018-11-30
Payer: MEDICARE

## 2018-11-30 PROCEDURE — 93798 PHYS/QHP OP CAR RHAB W/ECG: CPT

## 2018-12-03 ENCOUNTER — HOSPITAL ENCOUNTER (OUTPATIENT)
Dept: CARDIAC REHAB | Age: 68
Setting detail: THERAPIES SERIES
Discharge: HOME | End: 2018-12-03
Payer: MEDICARE

## 2018-12-03 PROCEDURE — 93798 PHYS/QHP OP CAR RHAB W/ECG: CPT

## 2018-12-05 ENCOUNTER — HOSPITAL ENCOUNTER (OUTPATIENT)
Dept: CARDIAC REHAB | Age: 68
Setting detail: THERAPIES SERIES
Discharge: HOME | End: 2018-12-05
Payer: MEDICARE

## 2018-12-05 PROCEDURE — 93798 PHYS/QHP OP CAR RHAB W/ECG: CPT

## 2018-12-07 ENCOUNTER — HOSPITAL ENCOUNTER (OUTPATIENT)
Dept: CARDIAC REHAB | Age: 68
Setting detail: THERAPIES SERIES
Discharge: HOME | End: 2018-12-07
Payer: MEDICARE

## 2018-12-07 PROCEDURE — 93798 PHYS/QHP OP CAR RHAB W/ECG: CPT

## 2018-12-10 ENCOUNTER — TELEPHONE (OUTPATIENT)
Dept: FAMILY MEDICINE CLINIC | Age: 68
End: 2018-12-10

## 2018-12-10 ENCOUNTER — HOSPITAL ENCOUNTER (OUTPATIENT)
Dept: CARDIAC REHAB | Age: 68
Setting detail: THERAPIES SERIES
Discharge: HOME | End: 2018-12-10
Payer: MEDICARE

## 2018-12-10 DIAGNOSIS — I20.9 ANGINA, CLASS III (HCC): ICD-10-CM

## 2018-12-10 PROCEDURE — 93798 PHYS/QHP OP CAR RHAB W/ECG: CPT

## 2018-12-10 NOTE — TELEPHONE ENCOUNTER
Jo Ann Watts  [facility]   2/18/2019 9:00 AM Brianda Colindres  [facility]   2/20/2019 9:00 AM Brianda Colindres  [facility]   2/22/2019 9:00 AM Brianda Colindres  [facility]   2/25/2019 9:00 AM Brianda Colindres  [facility]   2/27/2019 9:00 AM Brianda Colindres  [facility]

## 2018-12-12 ENCOUNTER — HOSPITAL ENCOUNTER (OUTPATIENT)
Dept: CARDIAC REHAB | Age: 68
Setting detail: THERAPIES SERIES
Discharge: HOME | End: 2018-12-12
Payer: MEDICARE

## 2018-12-12 PROCEDURE — 93798 PHYS/QHP OP CAR RHAB W/ECG: CPT

## 2018-12-14 ENCOUNTER — HOSPITAL ENCOUNTER (OUTPATIENT)
Dept: CARDIAC REHAB | Age: 68
Setting detail: THERAPIES SERIES
Discharge: HOME | End: 2018-12-14
Payer: MEDICARE

## 2018-12-14 PROCEDURE — 93798 PHYS/QHP OP CAR RHAB W/ECG: CPT

## 2018-12-17 ENCOUNTER — HOSPITAL ENCOUNTER (OUTPATIENT)
Dept: CARDIAC REHAB | Age: 68
Setting detail: THERAPIES SERIES
Discharge: HOME | End: 2018-12-17
Payer: MEDICARE

## 2018-12-17 PROCEDURE — 93798 PHYS/QHP OP CAR RHAB W/ECG: CPT

## 2018-12-19 ENCOUNTER — HOSPITAL ENCOUNTER (OUTPATIENT)
Dept: CARDIAC REHAB | Age: 68
Setting detail: THERAPIES SERIES
Discharge: HOME | End: 2018-12-19
Payer: MEDICARE

## 2018-12-19 PROCEDURE — 93798 PHYS/QHP OP CAR RHAB W/ECG: CPT

## 2018-12-21 ENCOUNTER — HOSPITAL ENCOUNTER (EMERGENCY)
Age: 68
Discharge: HOME | End: 2018-12-22
Attending: EMERGENCY MEDICINE
Payer: MEDICARE

## 2018-12-21 ENCOUNTER — HOSPITAL ENCOUNTER (OUTPATIENT)
Dept: CARDIAC REHAB | Age: 68
Setting detail: THERAPIES SERIES
Discharge: HOME | End: 2018-12-21
Payer: MEDICARE

## 2018-12-21 DIAGNOSIS — I10 ESSENTIAL HYPERTENSION: Primary | ICD-10-CM

## 2018-12-21 DIAGNOSIS — F41.1 ANXIETY STATE: ICD-10-CM

## 2018-12-21 LAB
EKG ATRIAL RATE: 68 BPM
EKG P AXIS: 19 DEGREES
EKG P-R INTERVAL: 260 MS
EKG Q-T INTERVAL: 410 MS
EKG QRS DURATION: 106 MS
EKG QTC CALCULATION (BAZETT): 435 MS
EKG R AXIS: -9 DEGREES
EKG T AXIS: 39 DEGREES
EKG VENTRICULAR RATE: 68 BPM

## 2018-12-21 PROCEDURE — 93005 ELECTROCARDIOGRAM TRACING: CPT

## 2018-12-21 PROCEDURE — 6360000002 HC RX W HCPCS: Performed by: EMERGENCY MEDICINE

## 2018-12-21 PROCEDURE — 96374 THER/PROPH/DIAG INJ IV PUSH: CPT

## 2018-12-21 PROCEDURE — 99283 EMERGENCY DEPT VISIT LOW MDM: CPT

## 2018-12-21 PROCEDURE — 2500000003 HC RX 250 WO HCPCS: Performed by: EMERGENCY MEDICINE

## 2018-12-21 PROCEDURE — 93798 PHYS/QHP OP CAR RHAB W/ECG: CPT

## 2018-12-21 PROCEDURE — 96375 TX/PRO/DX INJ NEW DRUG ADDON: CPT

## 2018-12-21 PROCEDURE — 2580000003 HC RX 258: Performed by: EMERGENCY MEDICINE

## 2018-12-21 RX ORDER — SODIUM CHLORIDE 0.9 % (FLUSH) 0.9 %
3 SYRINGE (ML) INJECTION EVERY 8 HOURS
Status: DISCONTINUED | OUTPATIENT
Start: 2018-12-21 | End: 2018-12-22 | Stop reason: HOSPADM

## 2018-12-21 RX ORDER — LEVOTHYROXINE SODIUM 112 UG/1
112 CAPSULE ORAL DAILY
COMMUNITY
End: 2019-02-19 | Stop reason: SDUPTHER

## 2018-12-21 RX ORDER — LABETALOL HYDROCHLORIDE 5 MG/ML
5 INJECTION, SOLUTION INTRAVENOUS ONCE
Status: COMPLETED | OUTPATIENT
Start: 2018-12-21 | End: 2018-12-21

## 2018-12-21 RX ORDER — LORAZEPAM 2 MG/ML
0.25 INJECTION INTRAMUSCULAR ONCE
Status: COMPLETED | OUTPATIENT
Start: 2018-12-21 | End: 2018-12-21

## 2018-12-21 RX ORDER — ATORVASTATIN CALCIUM 80 MG/1
80 TABLET, FILM COATED ORAL DAILY
COMMUNITY
Start: 2018-11-26 | End: 2019-02-19 | Stop reason: SDUPTHER

## 2018-12-21 RX ADMIN — Medication 3 ML: at 23:25

## 2018-12-21 RX ADMIN — LORAZEPAM 0.25 MG: 2 INJECTION, SOLUTION INTRAMUSCULAR; INTRAVENOUS at 23:25

## 2018-12-21 RX ADMIN — LABETALOL HYDROCHLORIDE 5 MG: 5 INJECTION, SOLUTION INTRAVENOUS at 23:25

## 2018-12-22 VITALS
TEMPERATURE: 98.5 F | SYSTOLIC BLOOD PRESSURE: 142 MMHG | RESPIRATION RATE: 18 BRPM | WEIGHT: 190 LBS | OXYGEN SATURATION: 99 % | DIASTOLIC BLOOD PRESSURE: 89 MMHG | HEIGHT: 68 IN | HEART RATE: 71 BPM | BODY MASS INDEX: 28.79 KG/M2

## 2018-12-22 NOTE — ED PROVIDER NOTES
with a voice recognition program.  Efforts were made to edit the dictations but occasionally words are mis-transcribed.)    Randy Oliveira MD (electronically signed)  Attending Emergency Physician          Randy Oliveira MD  12/21/18 [id number]

## 2018-12-24 ENCOUNTER — APPOINTMENT (OUTPATIENT)
Dept: CARDIAC REHAB | Age: 68
End: 2018-12-24
Payer: MEDICARE

## 2018-12-24 PROCEDURE — 93010 ELECTROCARDIOGRAM REPORT: CPT | Performed by: INTERNAL MEDICINE

## 2018-12-26 ENCOUNTER — HOSPITAL ENCOUNTER (OUTPATIENT)
Dept: CARDIAC REHAB | Age: 68
Setting detail: THERAPIES SERIES
Discharge: HOME | End: 2018-12-26
Payer: MEDICARE

## 2018-12-26 PROCEDURE — 93798 PHYS/QHP OP CAR RHAB W/ECG: CPT

## 2019-01-02 ENCOUNTER — APPOINTMENT (OUTPATIENT)
Dept: CARDIAC REHAB | Age: 69
End: 2019-01-02
Payer: MEDICARE

## 2019-01-04 ENCOUNTER — HOSPITAL ENCOUNTER (OUTPATIENT)
Dept: CARDIAC REHAB | Age: 69
Setting detail: THERAPIES SERIES
Discharge: HOME | End: 2019-01-04
Payer: MEDICARE

## 2019-01-04 PROCEDURE — 93798 PHYS/QHP OP CAR RHAB W/ECG: CPT

## 2019-01-07 ENCOUNTER — HOSPITAL ENCOUNTER (OUTPATIENT)
Dept: CARDIAC REHAB | Age: 69
Setting detail: THERAPIES SERIES
Discharge: HOME | End: 2019-01-07
Payer: MEDICARE

## 2019-01-07 PROCEDURE — 93798 PHYS/QHP OP CAR RHAB W/ECG: CPT

## 2019-01-09 ENCOUNTER — HOSPITAL ENCOUNTER (OUTPATIENT)
Dept: CARDIAC REHAB | Age: 69
Setting detail: THERAPIES SERIES
Discharge: HOME | End: 2019-01-09
Payer: MEDICARE

## 2019-01-09 PROCEDURE — 93798 PHYS/QHP OP CAR RHAB W/ECG: CPT

## 2019-01-11 ENCOUNTER — HOSPITAL ENCOUNTER (OUTPATIENT)
Dept: CARDIAC REHAB | Age: 69
Setting detail: THERAPIES SERIES
Discharge: HOME | End: 2019-01-11
Payer: MEDICARE

## 2019-01-11 PROCEDURE — 93798 PHYS/QHP OP CAR RHAB W/ECG: CPT

## 2019-01-14 ENCOUNTER — HOSPITAL ENCOUNTER (OUTPATIENT)
Dept: CARDIAC REHAB | Age: 69
Setting detail: THERAPIES SERIES
Discharge: HOME | End: 2019-01-14
Payer: MEDICARE

## 2019-01-14 PROCEDURE — 93798 PHYS/QHP OP CAR RHAB W/ECG: CPT

## 2019-01-16 ENCOUNTER — HOSPITAL ENCOUNTER (OUTPATIENT)
Dept: CARDIAC REHAB | Age: 69
Setting detail: THERAPIES SERIES
Discharge: HOME | End: 2019-01-16
Payer: MEDICARE

## 2019-01-16 PROCEDURE — 93798 PHYS/QHP OP CAR RHAB W/ECG: CPT

## 2019-01-18 ENCOUNTER — HOSPITAL ENCOUNTER (OUTPATIENT)
Dept: CARDIAC REHAB | Age: 69
Setting detail: THERAPIES SERIES
Discharge: HOME | End: 2019-01-18
Payer: MEDICARE

## 2019-01-18 PROCEDURE — 93798 PHYS/QHP OP CAR RHAB W/ECG: CPT

## 2019-01-21 ENCOUNTER — HOSPITAL ENCOUNTER (OUTPATIENT)
Dept: CARDIAC REHAB | Age: 69
Setting detail: THERAPIES SERIES
Discharge: HOME | End: 2019-01-21
Payer: MEDICARE

## 2019-01-21 PROCEDURE — 93798 PHYS/QHP OP CAR RHAB W/ECG: CPT

## 2019-01-23 ENCOUNTER — HOSPITAL ENCOUNTER (OUTPATIENT)
Dept: CARDIAC REHAB | Age: 69
Setting detail: THERAPIES SERIES
Discharge: HOME | End: 2019-01-23
Payer: MEDICARE

## 2019-01-23 PROCEDURE — 93798 PHYS/QHP OP CAR RHAB W/ECG: CPT

## 2019-01-25 ENCOUNTER — HOSPITAL ENCOUNTER (OUTPATIENT)
Dept: CARDIAC REHAB | Age: 69
Setting detail: THERAPIES SERIES
Discharge: HOME | End: 2019-01-25
Payer: MEDICARE

## 2019-01-25 PROCEDURE — 93798 PHYS/QHP OP CAR RHAB W/ECG: CPT

## 2019-01-28 ENCOUNTER — HOSPITAL ENCOUNTER (OUTPATIENT)
Dept: CARDIAC REHAB | Age: 69
Setting detail: THERAPIES SERIES
Discharge: HOME | End: 2019-01-28
Payer: MEDICARE

## 2019-01-28 PROCEDURE — 93798 PHYS/QHP OP CAR RHAB W/ECG: CPT

## 2019-01-30 ENCOUNTER — HOSPITAL ENCOUNTER (OUTPATIENT)
Dept: CARDIAC REHAB | Age: 69
Setting detail: THERAPIES SERIES
Discharge: HOME | End: 2019-01-30
Payer: MEDICARE

## 2019-01-30 PROCEDURE — 93798 PHYS/QHP OP CAR RHAB W/ECG: CPT

## 2019-02-01 ENCOUNTER — HOSPITAL ENCOUNTER (OUTPATIENT)
Dept: CARDIAC REHAB | Age: 69
Setting detail: THERAPIES SERIES
Discharge: HOME | End: 2019-02-01
Payer: MEDICARE

## 2019-02-01 PROCEDURE — 93798 PHYS/QHP OP CAR RHAB W/ECG: CPT

## 2019-02-04 ENCOUNTER — HOSPITAL ENCOUNTER (OUTPATIENT)
Dept: CARDIAC REHAB | Age: 69
Setting detail: THERAPIES SERIES
Discharge: HOME | End: 2019-02-04
Payer: MEDICARE

## 2019-02-04 PROCEDURE — 93798 PHYS/QHP OP CAR RHAB W/ECG: CPT

## 2019-02-06 ENCOUNTER — HOSPITAL ENCOUNTER (OUTPATIENT)
Dept: CARDIAC REHAB | Age: 69
Setting detail: THERAPIES SERIES
Discharge: HOME | End: 2019-02-06
Payer: MEDICARE

## 2019-02-06 PROCEDURE — 93798 PHYS/QHP OP CAR RHAB W/ECG: CPT

## 2019-02-08 ENCOUNTER — HOSPITAL ENCOUNTER (OUTPATIENT)
Dept: CARDIAC REHAB | Age: 69
Setting detail: THERAPIES SERIES
Discharge: HOME | End: 2019-02-08
Payer: MEDICARE

## 2019-02-08 PROCEDURE — 93798 PHYS/QHP OP CAR RHAB W/ECG: CPT

## 2019-02-11 ENCOUNTER — HOSPITAL ENCOUNTER (OUTPATIENT)
Dept: CARDIAC REHAB | Age: 69
Setting detail: THERAPIES SERIES
Discharge: HOME | End: 2019-02-11
Payer: MEDICARE

## 2019-02-11 PROCEDURE — 93798 PHYS/QHP OP CAR RHAB W/ECG: CPT

## 2019-02-15 ENCOUNTER — HOSPITAL ENCOUNTER (OUTPATIENT)
Dept: CARDIAC REHAB | Age: 69
Setting detail: THERAPIES SERIES
Discharge: HOME | End: 2019-02-15
Payer: MEDICARE

## 2019-02-15 PROCEDURE — 93798 PHYS/QHP OP CAR RHAB W/ECG: CPT

## 2019-02-16 ENCOUNTER — HOSPITAL ENCOUNTER (EMERGENCY)
Age: 69
Discharge: HOME | End: 2019-02-16
Attending: FAMILY MEDICINE
Payer: MEDICARE

## 2019-02-16 ENCOUNTER — APPOINTMENT (OUTPATIENT)
Dept: GENERAL RADIOLOGY | Age: 69
End: 2019-02-16
Payer: MEDICARE

## 2019-02-16 VITALS
HEART RATE: 83 BPM | DIASTOLIC BLOOD PRESSURE: 62 MMHG | OXYGEN SATURATION: 99 % | WEIGHT: 187 LBS | TEMPERATURE: 98 F | HEIGHT: 70 IN | RESPIRATION RATE: 18 BRPM | BODY MASS INDEX: 26.77 KG/M2 | SYSTOLIC BLOOD PRESSURE: 131 MMHG

## 2019-02-16 DIAGNOSIS — I10 ESSENTIAL HYPERTENSION: Primary | ICD-10-CM

## 2019-02-16 LAB
ALBUMIN SERPL-MCNC: 3.8 G/DL (ref 3.5–4.6)
ALP BLD-CCNC: 81 U/L (ref 35–104)
ALT SERPL-CCNC: 35 U/L (ref 0–41)
ANION GAP SERPL CALCULATED.3IONS-SCNC: 13 MEQ/L (ref 9–15)
AST SERPL-CCNC: 25 U/L (ref 0–40)
BASOPHILS ABSOLUTE: 0.2 K/UL (ref 0–0.2)
BASOPHILS RELATIVE PERCENT: 2.7 %
BILIRUB SERPL-MCNC: 0.3 MG/DL (ref 0.2–0.7)
BUN BLDV-MCNC: 13 MG/DL (ref 8–23)
CALCIUM SERPL-MCNC: 9.4 MG/DL (ref 8.5–9.9)
CHLORIDE BLD-SCNC: 96 MEQ/L (ref 95–107)
CK MB: 3.1 NG/ML (ref 0–6.7)
CO2: 25 MEQ/L (ref 20–31)
CREAT SERPL-MCNC: 0.76 MG/DL (ref 0.7–1.2)
CREATINE KINASE-MB INDEX: 2.3 % (ref 0–3.5)
EKG ATRIAL RATE: 73 BPM
EKG P AXIS: 13 DEGREES
EKG P-R INTERVAL: 224 MS
EKG Q-T INTERVAL: 412 MS
EKG QRS DURATION: 106 MS
EKG QTC CALCULATION (BAZETT): 453 MS
EKG R AXIS: -17 DEGREES
EKG T AXIS: 9 DEGREES
EKG VENTRICULAR RATE: 73 BPM
EOSINOPHILS ABSOLUTE: 0.4 K/UL (ref 0–0.7)
EOSINOPHILS RELATIVE PERCENT: 4.8 %
GFR AFRICAN AMERICAN: >60
GFR NON-AFRICAN AMERICAN: >60
GLOBULIN: 4.4 G/DL (ref 2.3–3.5)
GLUCOSE BLD-MCNC: 142 MG/DL (ref 70–99)
HCT VFR BLD CALC: 45.9 % (ref 42–52)
HEMOGLOBIN: 15.7 G/DL (ref 14–18)
LYMPHOCYTES ABSOLUTE: 1.4 K/UL (ref 1–4.8)
LYMPHOCYTES RELATIVE PERCENT: 17.4 %
MCH RBC QN AUTO: 32.1 PG (ref 27–31.3)
MCHC RBC AUTO-ENTMCNC: 34.2 % (ref 33–37)
MCV RBC AUTO: 93.9 FL (ref 80–100)
MONOCYTES ABSOLUTE: 0.9 K/UL (ref 0.2–0.8)
MONOCYTES RELATIVE PERCENT: 11.5 %
NEUTROPHILS ABSOLUTE: 5.1 K/UL (ref 1.4–6.5)
NEUTROPHILS RELATIVE PERCENT: 63.6 %
PDW BLD-RTO: 13.8 % (ref 11.5–14.5)
PLATELET # BLD: 225 K/UL (ref 130–400)
POTASSIUM SERPL-SCNC: 4 MEQ/L (ref 3.4–4.9)
PRO-BNP: 384 PG/ML
RBC # BLD: 4.88 M/UL (ref 4.7–6.1)
SODIUM BLD-SCNC: 134 MEQ/L (ref 135–144)
TOTAL CK: 137 U/L (ref 0–190)
TOTAL PROTEIN: 8.2 G/DL (ref 6.3–8)
TROPONIN: <0.01 NG/ML (ref 0–0.01)
WBC # BLD: 8 K/UL (ref 4.8–10.8)

## 2019-02-16 PROCEDURE — 36415 COLL VENOUS BLD VENIPUNCTURE: CPT

## 2019-02-16 PROCEDURE — 84484 ASSAY OF TROPONIN QUANT: CPT

## 2019-02-16 PROCEDURE — 85025 COMPLETE CBC W/AUTO DIFF WBC: CPT

## 2019-02-16 PROCEDURE — 82550 ASSAY OF CK (CPK): CPT

## 2019-02-16 PROCEDURE — 83880 ASSAY OF NATRIURETIC PEPTIDE: CPT

## 2019-02-16 PROCEDURE — 99284 EMERGENCY DEPT VISIT MOD MDM: CPT

## 2019-02-16 PROCEDURE — 6370000000 HC RX 637 (ALT 250 FOR IP): Performed by: FAMILY MEDICINE

## 2019-02-16 PROCEDURE — 93005 ELECTROCARDIOGRAM TRACING: CPT

## 2019-02-16 PROCEDURE — 82553 CREATINE MB FRACTION: CPT

## 2019-02-16 PROCEDURE — 71045 X-RAY EXAM CHEST 1 VIEW: CPT

## 2019-02-16 PROCEDURE — 80053 COMPREHEN METABOLIC PANEL: CPT

## 2019-02-16 RX ORDER — LORAZEPAM 0.5 MG/1
1 TABLET ORAL ONCE
Status: COMPLETED | OUTPATIENT
Start: 2019-02-16 | End: 2019-02-16

## 2019-02-16 RX ORDER — LISINOPRIL 5 MG/1
5 TABLET ORAL
COMMUNITY
Start: 2019-01-28 | End: 2019-02-19 | Stop reason: SDUPTHER

## 2019-02-16 RX ORDER — LISINOPRIL 5 MG/1
5 TABLET ORAL DAILY
COMMUNITY
End: 2019-02-19

## 2019-02-16 RX ORDER — DESOXIMETASONE 2.5 MG/G
OINTMENT TOPICAL
COMMUNITY
End: 2019-02-19

## 2019-02-16 RX ADMIN — METOPROLOL TARTRATE 25 MG: 25 TABLET ORAL at 18:32

## 2019-02-16 RX ADMIN — LORAZEPAM 1 MG: 0.5 TABLET ORAL at 20:15

## 2019-02-17 ASSESSMENT — ENCOUNTER SYMPTOMS
ABDOMINAL PAIN: 0
ALLERGIC/IMMUNOLOGIC NEGATIVE: 1
BLURRED VISION: 0
SHORTNESS OF BREATH: 0
NAUSEA: 0
EYES NEGATIVE: 1
VOMITING: 0

## 2019-02-18 PROCEDURE — 93010 ELECTROCARDIOGRAM REPORT: CPT | Performed by: INTERNAL MEDICINE

## 2019-02-19 ENCOUNTER — OFFICE VISIT (OUTPATIENT)
Dept: FAMILY MEDICINE CLINIC | Age: 69
End: 2019-02-19
Payer: MEDICARE

## 2019-02-19 VITALS
WEIGHT: 186 LBS | RESPIRATION RATE: 14 BRPM | DIASTOLIC BLOOD PRESSURE: 62 MMHG | BODY MASS INDEX: 26.63 KG/M2 | SYSTOLIC BLOOD PRESSURE: 112 MMHG | TEMPERATURE: 97.6 F | HEART RATE: 68 BPM | HEIGHT: 70 IN

## 2019-02-19 DIAGNOSIS — R73.9 HYPERGLYCEMIA: ICD-10-CM

## 2019-02-19 DIAGNOSIS — E03.9 ACQUIRED HYPOTHYROIDISM: ICD-10-CM

## 2019-02-19 DIAGNOSIS — F41.1 GENERALIZED ANXIETY DISORDER: ICD-10-CM

## 2019-02-19 DIAGNOSIS — I10 ESSENTIAL HYPERTENSION: Primary | ICD-10-CM

## 2019-02-19 LAB
HBA1C MFR BLD: 6.2 % (ref 4.8–5.9)
TSH SERPL DL<=0.05 MIU/L-ACNC: 5.79 UIU/ML (ref 0.44–3.86)

## 2019-02-19 PROCEDURE — G8484 FLU IMMUNIZE NO ADMIN: HCPCS | Performed by: FAMILY MEDICINE

## 2019-02-19 PROCEDURE — 3017F COLORECTAL CA SCREEN DOC REV: CPT | Performed by: FAMILY MEDICINE

## 2019-02-19 PROCEDURE — 4040F PNEUMOC VAC/ADMIN/RCVD: CPT | Performed by: FAMILY MEDICINE

## 2019-02-19 PROCEDURE — G8419 CALC BMI OUT NRM PARAM NOF/U: HCPCS | Performed by: FAMILY MEDICINE

## 2019-02-19 PROCEDURE — 1123F ACP DISCUSS/DSCN MKR DOCD: CPT | Performed by: FAMILY MEDICINE

## 2019-02-19 PROCEDURE — G8427 DOCREV CUR MEDS BY ELIG CLIN: HCPCS | Performed by: FAMILY MEDICINE

## 2019-02-19 PROCEDURE — G8598 ASA/ANTIPLAT THER USED: HCPCS | Performed by: FAMILY MEDICINE

## 2019-02-19 PROCEDURE — 1101F PT FALLS ASSESS-DOCD LE1/YR: CPT | Performed by: FAMILY MEDICINE

## 2019-02-19 PROCEDURE — 99213 OFFICE O/P EST LOW 20 MIN: CPT | Performed by: FAMILY MEDICINE

## 2019-02-19 PROCEDURE — 1036F TOBACCO NON-USER: CPT | Performed by: FAMILY MEDICINE

## 2019-02-19 RX ORDER — ALPRAZOLAM 0.5 MG/1
0.5 TABLET ORAL DAILY PRN
Qty: 30 TABLET | Refills: 0 | Status: SHIPPED | OUTPATIENT
Start: 2019-02-19 | End: 2019-03-21

## 2019-02-19 RX ORDER — LISINOPRIL 5 MG/1
5 TABLET ORAL DAILY
Qty: 90 TABLET | Refills: 3 | Status: SHIPPED | OUTPATIENT
Start: 2019-02-19 | End: 2019-12-10 | Stop reason: SDUPTHER

## 2019-02-19 RX ORDER — ATORVASTATIN CALCIUM 80 MG/1
40 TABLET, FILM COATED ORAL DAILY
Qty: 90 TABLET | Refills: 3 | Status: SHIPPED | OUTPATIENT
Start: 2019-02-19 | End: 2020-01-28

## 2019-02-19 ASSESSMENT — PATIENT HEALTH QUESTIONNAIRE - PHQ9
SUM OF ALL RESPONSES TO PHQ QUESTIONS 1-9: 0
SUM OF ALL RESPONSES TO PHQ QUESTIONS 1-9: 0
1. LITTLE INTEREST OR PLEASURE IN DOING THINGS: 0
SUM OF ALL RESPONSES TO PHQ9 QUESTIONS 1 & 2: 0
2. FEELING DOWN, DEPRESSED OR HOPELESS: 0

## 2019-02-20 ENCOUNTER — HOSPITAL ENCOUNTER (OUTPATIENT)
Dept: CARDIAC REHAB | Age: 69
Setting detail: THERAPIES SERIES
Discharge: HOME | End: 2019-02-20
Payer: MEDICARE

## 2019-02-20 PROCEDURE — 93798 PHYS/QHP OP CAR RHAB W/ECG: CPT

## 2019-03-01 ENCOUNTER — HOSPITAL ENCOUNTER (OUTPATIENT)
Dept: CARDIAC REHAB | Age: 69
Setting detail: THERAPIES SERIES
Discharge: HOME | End: 2019-03-01
Payer: MEDICARE

## 2019-03-01 PROCEDURE — 93798 PHYS/QHP OP CAR RHAB W/ECG: CPT

## 2019-03-04 ENCOUNTER — HOSPITAL ENCOUNTER (OUTPATIENT)
Dept: CARDIAC REHAB | Age: 69
Setting detail: THERAPIES SERIES
Discharge: HOME | End: 2019-03-04
Payer: MEDICARE

## 2019-03-04 PROCEDURE — 93798 PHYS/QHP OP CAR RHAB W/ECG: CPT

## 2019-03-06 ENCOUNTER — HOSPITAL ENCOUNTER (OUTPATIENT)
Dept: CARDIAC REHAB | Age: 69
Setting detail: THERAPIES SERIES
Discharge: HOME | End: 2019-03-06
Payer: MEDICARE

## 2019-03-06 PROCEDURE — 93798 PHYS/QHP OP CAR RHAB W/ECG: CPT

## 2019-03-11 ENCOUNTER — HOSPITAL ENCOUNTER (OUTPATIENT)
Dept: CARDIAC REHAB | Age: 69
Setting detail: THERAPIES SERIES
Discharge: HOME | End: 2019-03-11
Payer: MEDICARE

## 2019-03-11 PROCEDURE — 93798 PHYS/QHP OP CAR RHAB W/ECG: CPT

## 2019-03-13 ENCOUNTER — HOSPITAL ENCOUNTER (OUTPATIENT)
Dept: CARDIAC REHAB | Age: 69
Setting detail: THERAPIES SERIES
Discharge: HOME | End: 2019-03-13
Payer: MEDICARE

## 2019-03-13 PROCEDURE — 93798 PHYS/QHP OP CAR RHAB W/ECG: CPT

## 2019-03-15 ENCOUNTER — APPOINTMENT (OUTPATIENT)
Dept: CARDIAC REHAB | Age: 69
End: 2019-03-15
Payer: MEDICARE

## 2019-03-18 ENCOUNTER — APPOINTMENT (OUTPATIENT)
Dept: CARDIAC REHAB | Age: 69
End: 2019-03-18
Payer: MEDICARE

## 2019-03-20 ENCOUNTER — TELEPHONE (OUTPATIENT)
Dept: FAMILY MEDICINE CLINIC | Age: 69
End: 2019-03-20

## 2019-03-20 ENCOUNTER — APPOINTMENT (OUTPATIENT)
Dept: CARDIAC REHAB | Age: 69
End: 2019-03-20
Payer: MEDICARE

## 2019-03-22 ENCOUNTER — APPOINTMENT (OUTPATIENT)
Dept: CARDIAC REHAB | Age: 69
End: 2019-03-22
Payer: MEDICARE

## 2019-04-08 ENCOUNTER — OFFICE VISIT (OUTPATIENT)
Dept: CARDIOLOGY CLINIC | Age: 69
End: 2019-04-08
Payer: MEDICARE

## 2019-04-08 VITALS
BODY MASS INDEX: 26.77 KG/M2 | DIASTOLIC BLOOD PRESSURE: 66 MMHG | WEIGHT: 187 LBS | HEART RATE: 78 BPM | RESPIRATION RATE: 16 BRPM | SYSTOLIC BLOOD PRESSURE: 108 MMHG | OXYGEN SATURATION: 97 % | HEIGHT: 70 IN

## 2019-04-08 DIAGNOSIS — I10 ESSENTIAL HYPERTENSION: ICD-10-CM

## 2019-04-08 DIAGNOSIS — E78.2 MIXED HYPERLIPIDEMIA: ICD-10-CM

## 2019-04-08 DIAGNOSIS — Z95.1 S/P CABG (CORONARY ARTERY BYPASS GRAFT): ICD-10-CM

## 2019-04-08 DIAGNOSIS — I25.10 CORONARY ARTERY DISEASE INVOLVING NATIVE CORONARY ARTERY OF NATIVE HEART WITHOUT ANGINA PECTORIS: Primary | ICD-10-CM

## 2019-04-08 PROCEDURE — 1123F ACP DISCUSS/DSCN MKR DOCD: CPT | Performed by: INTERNAL MEDICINE

## 2019-04-08 PROCEDURE — 3017F COLORECTAL CA SCREEN DOC REV: CPT | Performed by: INTERNAL MEDICINE

## 2019-04-08 PROCEDURE — 4040F PNEUMOC VAC/ADMIN/RCVD: CPT | Performed by: INTERNAL MEDICINE

## 2019-04-08 PROCEDURE — G8598 ASA/ANTIPLAT THER USED: HCPCS | Performed by: INTERNAL MEDICINE

## 2019-04-08 PROCEDURE — 99214 OFFICE O/P EST MOD 30 MIN: CPT | Performed by: INTERNAL MEDICINE

## 2019-04-08 PROCEDURE — G8419 CALC BMI OUT NRM PARAM NOF/U: HCPCS | Performed by: INTERNAL MEDICINE

## 2019-04-08 PROCEDURE — 1036F TOBACCO NON-USER: CPT | Performed by: INTERNAL MEDICINE

## 2019-04-08 PROCEDURE — G8427 DOCREV CUR MEDS BY ELIG CLIN: HCPCS | Performed by: INTERNAL MEDICINE

## 2019-04-08 NOTE — PROGRESS NOTES
[facility] CARDIOLOGY OFFICE FOLLOW-UP      Patient: Donovan Pena  YOB: 1950  MRN: [id number]    Chief Complaint:  Chief Complaint   Patient presents with    Heart Problem     Post CABG       Subjective/HPI    The patient is followed in the cardiology office chronically for the following problems: CAD, prior CABG, ( RCA, LMT stenosis). EF 45%. The last encounter focused on the following: first visit since CABG. Was being seen at the clinic but transferring back    Testing/hospitalizations/procedures performed since last encounter: none    Since the last encounter, the patient has had a couple of episodes of high BP at times. Now doing better. No chest pain. Patient did have one episode when he was jogging and felt chest discomfort and took a long time to recover, did not feel right for 30 minutes. Has not happened since then. Past Medical History:   Diagnosis Date    Eczema     Hyperlipidemia     Hypothyroidism        Past Surgical History:   Procedure Laterality Date    COLONOSCOPY  10/01/14    DR. Morocho    CORONARY ARTERY BYPASS GRAFT  10/11/2018    DR. DUMONT    CORONARY ARTERY BYPASS GRAFT  10/16/2018    DR. DUMONT    CORONARY ARTERY BYPASS GRAFT      KNEE SURGERY      left    VASECTOMY         Family History   Problem Relation Age of Onset    High Blood Pressure Mother     High Cholesterol Father     Other Father         thyroid    Other Sister         thyroid    Other Brother         throid       Social History     Socioeconomic History    Marital status:      Spouse name: None    Number of children: None    Years of education: None    Highest education level: None   Occupational History    Occupation: Electical      Comment: [employer]   Social Needs    Financial resource strain: None    Food insecurity:     Worry: None     Inability: None    Transportation needs:     Medical: None     Non-medical: None   Tobacco Use    Smoking status: abdominal pain. Endocrine: Negative for cold intolerance and heat intolerance. Genitourinary: Negative for dysuria and enuresis. Musculoskeletal: Negative for arthralgias and back pain. Skin: Negative for color change. Allergic/Immunologic: Negative. Neurological: Negative for dizziness, seizures, syncope and light-headedness. Psychiatric/Behavioral: Negative for agitation, behavioral problems and confusion. Physical Examination:    /66 (Site: Left Upper Arm, Position: Sitting, Cuff Size: Large Adult)   Pulse 78   Resp 16   Ht 5' 10\" (1.778 m)   Wt 187 lb (84.8 kg)   SpO2 97%   BMI 26.83 kg/m²    Physical Exam   Constitutional: The patient appears healthy. No distress. HENT: Mouth/Throat: Oropharynx is clear. Eyes: Pupils are equal, round, and reactive to light. Neck: Normal range of motion. No JVD present. Cardiovascular: Regular rhythm, S1 normal, S2 normal, normal heart sounds and intact distal pulses. Exam reveals no gallop. No murmur heard. Pulses:       Radial pulses are 2+ on the right side. Dorsalis pedis pulses are 2+ on the right side. Pulmonary/Chest: Effort normal and breath sounds normal. No wheezes. No rales. No tenderness. Abdominal: Soft. Bowel sounds are normal.   Musculoskeletal: Normal range of motion. No edema. Neurological: The patient is alert and oriented to person, place, and time. Intact cranial nerves. Skin: Skin is warm and dry. No rash noted.        LABS:  CBC:   Lab Results   Component Value Date    WBC 8.0 02/16/2019    RBC 4.88 02/16/2019    HGB 15.7 02/16/2019    HCT 45.9 02/16/2019    MCV 93.9 02/16/2019    MCH 32.1 02/16/2019    MCHC 34.2 02/16/2019    RDW 13.8 02/16/2019     02/16/2019    MPV 9.5 02/13/2014     Lipids:  Lab Results   Component Value Date    CHOL 184 05/23/2018    CHOL 166 07/13/2017    CHOL 173 01/12/2016     Lab Results   Component Value Date    TRIG 85 05/23/2018    TRIG 60 07/13/2017    TRIG 78 01/12/2016     Lab Results   Component Value Date    HDL 43 05/23/2018    HDL 44 07/13/2017    HDL 36 01/12/2016     Lab Results   Component Value Date    LDLCALC 118 05/23/2018    LDLCALC 110 07/13/2017    LDLCALC 115 01/12/2016     Lab Results   Component Value Date    LABVLDL 76.0 04/24/2013    VLDL 30 09/10/2013    VLDL 21 08/15/2012     Lab Results   Component Value Date    CHOLHDLRATIO 4.6 06/09/2010     CMP:    Lab Results   Component Value Date     02/16/2019    K 4.0 02/16/2019    K 4.2 10/04/2018    CL 96 02/16/2019    CO2 25 02/16/2019    BUN 13 02/16/2019    CREATININE 0.76 02/16/2019    GFRAA >60.0 02/16/2019    LABGLOM >60.0 02/16/2019    GLUCOSE 142 02/16/2019    PROT 8.2 02/16/2019    LABALBU 3.8 02/16/2019    CALCIUM 9.4 02/16/2019    BILITOT 0.3 02/16/2019    ALKPHOS 81 02/16/2019    AST 25 02/16/2019    ALT 35 02/16/2019     BMP:    Lab Results   Component Value Date     02/16/2019    K 4.0 02/16/2019    K 4.2 10/04/2018    CL 96 02/16/2019    CO2 25 02/16/2019    BUN 13 02/16/2019    LABALBU 3.8 02/16/2019    CREATININE 0.76 02/16/2019    CALCIUM 9.4 02/16/2019    GFRAA >60.0 02/16/2019    LABGLOM >60.0 02/16/2019    GLUCOSE 142 02/16/2019     Magnesium:    Lab Results   Component Value Date    MG 2.0 04/24/2013     TSH:  Lab Results   Component Value Date    TSH 5.790 (H) 02/19/2019       Patient Active Problem List   Diagnosis    Acquired hypothyroidism    Mixed hyperlipidemia    Lipoma    Eczema    RLS (restless legs syndrome)    Spondylolisthesis at L5-S1 level    Degenerative arthritis of lumbar spine    Basal cell papilloma    Pharyngoesophageal dysphagia    Angina, class IV (HCC)    Coronary artery disease involving native coronary artery of native heart without angina pectoris    S/P CABG (coronary artery bypass graft)       Medications:  Current Outpatient Medications   Medication Sig Dispense Refill    atorvastatin (LIPITOR) 80 MG tablet Take 0.5 tablets by mouth daily (Patient taking differently: Take 80 mg by mouth daily ) 90 tablet 3    lisinopril (PRINIVIL;ZESTRIL) 5 MG tablet Take 1 tablet by mouth daily 90 tablet 3    Sildenafil Citrate (VIAGRA PO) Take by mouth      metoprolol tartrate (LOPRESSOR) 25 MG tablet Take 1 tablet by mouth 2 times daily 180 tablet 3    aspirin 81 MG tablet Take 1 tablet by mouth 2 times daily With Food      desoximetasone (TOPICORT) 0.25 % cream Apply topically 2 times daily. 100 g 2    levothyroxine (SYNTHROID) 112 MCG tablet Take 1 tablet by mouth daily 90 tablet 3     No current facility-administered medications for this visit. Assessment/Plan:    1. Coronary artery disease involving native coronary artery of native heart without angina pectoris      2. S/P CABG (coronary artery bypass graft)      3. Mixed hyperlipidemia      4. Essential hypertension       Continue cardiac rehab. Continue lipitor 80. Stay on lisinopril and metoprolol. Cardiac rehab has tachy SVT and some dropped beats. Will stay on current dose metoprolol. Counseling: the patient was counseled regarding diet, exercise, weight control and heart healthy lifestyle. Return in about 3 months (around 7/8/2019) for followup cv disease. Electronically signed by   Steven Luis.  Vianey Hartman MD [facility] Director of Cardiology Services and Cardiac Catheterization Laboratory  [facility], [place]    on 4/8/2019 at 9:20 AM

## 2019-07-10 ENCOUNTER — OFFICE VISIT (OUTPATIENT)
Dept: CARDIOLOGY CLINIC | Age: 69
End: 2019-07-10
Payer: MEDICARE

## 2019-07-10 VITALS
HEIGHT: 70 IN | RESPIRATION RATE: 18 BRPM | HEART RATE: 73 BPM | OXYGEN SATURATION: 99 % | SYSTOLIC BLOOD PRESSURE: 124 MMHG | DIASTOLIC BLOOD PRESSURE: 87 MMHG | BODY MASS INDEX: 26.34 KG/M2 | WEIGHT: 184 LBS

## 2019-07-10 DIAGNOSIS — R00.0 TACHYCARDIA: ICD-10-CM

## 2019-07-10 DIAGNOSIS — I25.10 CORONARY ARTERY DISEASE INVOLVING NATIVE CORONARY ARTERY OF NATIVE HEART WITHOUT ANGINA PECTORIS: Primary | ICD-10-CM

## 2019-07-10 DIAGNOSIS — I10 ESSENTIAL HYPERTENSION: ICD-10-CM

## 2019-07-10 DIAGNOSIS — E78.2 MIXED HYPERLIPIDEMIA: ICD-10-CM

## 2019-07-10 DIAGNOSIS — Z95.1 S/P CABG (CORONARY ARTERY BYPASS GRAFT): ICD-10-CM

## 2019-07-10 PROCEDURE — G8419 CALC BMI OUT NRM PARAM NOF/U: HCPCS | Performed by: INTERNAL MEDICINE

## 2019-07-10 PROCEDURE — G8598 ASA/ANTIPLAT THER USED: HCPCS | Performed by: INTERNAL MEDICINE

## 2019-07-10 PROCEDURE — 3017F COLORECTAL CA SCREEN DOC REV: CPT | Performed by: INTERNAL MEDICINE

## 2019-07-10 PROCEDURE — 1123F ACP DISCUSS/DSCN MKR DOCD: CPT | Performed by: INTERNAL MEDICINE

## 2019-07-10 PROCEDURE — 1036F TOBACCO NON-USER: CPT | Performed by: INTERNAL MEDICINE

## 2019-07-10 PROCEDURE — 4040F PNEUMOC VAC/ADMIN/RCVD: CPT | Performed by: INTERNAL MEDICINE

## 2019-07-10 PROCEDURE — G8427 DOCREV CUR MEDS BY ELIG CLIN: HCPCS | Performed by: INTERNAL MEDICINE

## 2019-07-10 PROCEDURE — 99213 OFFICE O/P EST LOW 20 MIN: CPT | Performed by: INTERNAL MEDICINE

## 2019-07-10 RX ORDER — LISINOPRIL 5 MG/1
5 TABLET ORAL DAILY
COMMUNITY
End: 2020-01-28

## 2019-07-10 NOTE — PROGRESS NOTES
[facility] CARDIOLOGY OFFICE FOLLOW-UP      Patient: Dima Harris  YOB: 1950  MRN: [id number]    Chief Complaint:  Chief Complaint   Patient presents with    3 Month Follow-Up    Coronary Artery Disease    Hypertension    Hyperlipidemia    Swelling     BLE RT>LT       Subjective/HPI    The patient is followed in the cardiology office chronically for the following problems: CAD, prior CABG, HTN, HPL    The last encounter focused on the following: followup    Testing/hospitalizations/procedures performed since last encounter: none    Since the last encounter, the patient has not had new symptoms/events. Cardiac rehab is finished. Has had a couple episodes of high BP. Feels palps and HR elevates at times. Past Medical History:   Diagnosis Date    Eczema     Hyperlipidemia     Hypothyroidism        Past Surgical History:   Procedure Laterality Date    COLONOSCOPY  10/01/14    DR. Cecille Black    CORONARY ARTERY BYPASS GRAFT  10/11/2018    DR. DUMONT    CORONARY ARTERY BYPASS GRAFT  10/16/2018    DR. DUMONT    CORONARY ARTERY BYPASS GRAFT      KNEE SURGERY      left    VASECTOMY         Family History   Problem Relation Age of Onset    High Blood Pressure Mother     High Cholesterol Father     Other Father         thyroid    Other Sister         thyroid    Other Brother         throid       Social History     Socioeconomic History    Marital status:      Spouse name: None    Number of children: None    Years of education: None    Highest education level: None   Occupational History    Occupation: [employer]     Comment: [place]   Social Needs    Financial resource strain: None    Food insecurity:     Worry: None     Inability: None    Transportation needs:     Medical: None     Non-medical: None   Tobacco Use    Smoking status: Never Smoker    Smokeless tobacco: Never Used   Substance and Sexual Activity    Alcohol use: Yes     Types: 2 Glasses of wine, 2 Cans of beer per week     Comment: rare    Drug use: No    Sexual activity: None   Lifestyle    Physical activity:     Days per week: None     Minutes per session: None    Stress: None   Relationships    Social connections:     Talks on phone: None     Gets together: None     Attends Shinto service: None     Active member of club or organization: None     Attends meetings of clubs or organizations: None     Relationship status: None    Intimate partner violence:     Fear of current or ex partner: None     Emotionally abused: None     Physically abused: None     Forced sexual activity: None   Other Topics Concern    None   Social History Narrative    None       Allergies   Allergen Reactions    Vancomycin Itching and Swelling       Current Outpatient Medications   Medication Sig Dispense Refill    lisinopril (PRINIVIL;ZESTRIL) 5 MG tablet Take 5 mg by mouth daily      atorvastatin (LIPITOR) 80 MG tablet Take 0.5 tablets by mouth daily (Patient taking differently: Take 80 mg by mouth daily ) 90 tablet 3    Sildenafil Citrate (VIAGRA PO) Take by mouth      metoprolol tartrate (LOPRESSOR) 25 MG tablet Take 1 tablet by mouth 2 times daily 180 tablet 3    aspirin 81 MG tablet Take 1 tablet by mouth 2 times daily With Food      desoximetasone (TOPICORT) 0.25 % cream Apply topically 2 times daily. 100 g 2    levothyroxine (SYNTHROID) 112 MCG tablet Take 1 tablet by mouth daily 90 tablet 3    lisinopril (PRINIVIL;ZESTRIL) 5 MG tablet Take 1 tablet by mouth daily 90 tablet 3     No current facility-administered medications for this visit. Review of Systems:   Review of Systems   Constitutional: Negative for activity change and appetite change. HENT: Negative for congestion. Respiratory: Negative for apnea, choking and chest tightness. Cardiovascular: Negative for chest pain. Gastrointestinal: Negative for abdominal distention and abdominal pain.    Endocrine: Negative for cold intolerance 05/23/2018    HDL 44 07/13/2017    HDL 36 01/12/2016     Lab Results   Component Value Date    LDLCALC 118 05/23/2018    LDLCALC 110 07/13/2017    LDLCALC 115 01/12/2016     Lab Results   Component Value Date    LABVLDL 76.0 04/24/2013    VLDL 30 09/10/2013    VLDL 21 08/15/2012     Lab Results   Component Value Date    CHOLHDLRATIO 4.6 06/09/2010     CMP:    Lab Results   Component Value Date     02/16/2019    K 4.0 02/16/2019    K 4.2 10/04/2018    CL 96 02/16/2019    CO2 25 02/16/2019    BUN 13 02/16/2019    CREATININE 0.76 02/16/2019    GFRAA >60.0 02/16/2019    LABGLOM >60.0 02/16/2019    GLUCOSE 142 02/16/2019    PROT 8.2 02/16/2019    LABALBU 3.8 02/16/2019    CALCIUM 9.4 02/16/2019    BILITOT 0.3 02/16/2019    ALKPHOS 81 02/16/2019    AST 25 02/16/2019    ALT 35 02/16/2019     BMP:    Lab Results   Component Value Date     02/16/2019    K 4.0 02/16/2019    K 4.2 10/04/2018    CL 96 02/16/2019    CO2 25 02/16/2019    BUN 13 02/16/2019    LABALBU 3.8 02/16/2019    CREATININE 0.76 02/16/2019    CALCIUM 9.4 02/16/2019    GFRAA >60.0 02/16/2019    LABGLOM >60.0 02/16/2019    GLUCOSE 142 02/16/2019     Magnesium:    Lab Results   Component Value Date    MG 2.0 04/24/2013     TSH:  Lab Results   Component Value Date    TSH 5.790 (H) 02/19/2019       Patient Active Problem List   Diagnosis    Acquired hypothyroidism    Mixed hyperlipidemia    Lipoma    Eczema    RLS (restless legs syndrome)    Spondylolisthesis at L5-S1 level    Degenerative arthritis of lumbar spine    Basal cell papilloma    Pharyngoesophageal dysphagia    Angina, class IV (HCC)    Coronary artery disease involving native coronary artery of native heart without angina pectoris    S/P CABG (coronary artery bypass graft)       Medications:  Current Outpatient Medications   Medication Sig Dispense Refill    lisinopril (PRINIVIL;ZESTRIL) 5 MG tablet Take 5 mg by mouth daily      atorvastatin (LIPITOR) 80 MG tablet Take 0.5

## 2019-08-05 ENCOUNTER — HOSPITAL ENCOUNTER (OUTPATIENT)
Dept: NON INVASIVE DIAGNOSTICS | Age: 69
Discharge: HOME | End: 2019-08-05
Payer: MEDICARE

## 2019-08-05 DIAGNOSIS — R00.0 TACHYCARDIA: ICD-10-CM

## 2019-08-05 DIAGNOSIS — I25.10 CORONARY ARTERY DISEASE INVOLVING NATIVE CORONARY ARTERY OF NATIVE HEART WITHOUT ANGINA PECTORIS: ICD-10-CM

## 2019-08-05 DIAGNOSIS — Z95.1 S/P CABG (CORONARY ARTERY BYPASS GRAFT): ICD-10-CM

## 2019-08-05 LAB
LV EF: 60 %
LVEF MODALITY: NORMAL

## 2019-08-05 PROCEDURE — 93225 XTRNL ECG REC<48 HRS REC: CPT

## 2019-08-05 PROCEDURE — 93306 TTE W/DOPPLER COMPLETE: CPT

## 2019-08-05 PROCEDURE — 93226 XTRNL ECG REC<48 HR SCAN A/R: CPT

## 2019-08-08 PROCEDURE — 93227 XTRNL ECG REC<48 HR R&I: CPT | Performed by: INTERNAL MEDICINE

## 2019-10-09 ENCOUNTER — OFFICE VISIT (OUTPATIENT)
Dept: CARDIOLOGY CLINIC | Age: 69
End: 2019-10-09
Payer: MEDICARE

## 2019-10-09 VITALS
WEIGHT: 189.2 LBS | BODY MASS INDEX: 27.15 KG/M2 | HEART RATE: 64 BPM | OXYGEN SATURATION: 98 % | SYSTOLIC BLOOD PRESSURE: 112 MMHG | DIASTOLIC BLOOD PRESSURE: 82 MMHG | RESPIRATION RATE: 16 BRPM

## 2019-10-09 DIAGNOSIS — I25.10 CORONARY ARTERY DISEASE INVOLVING NATIVE CORONARY ARTERY OF NATIVE HEART WITHOUT ANGINA PECTORIS: Primary | ICD-10-CM

## 2019-10-09 DIAGNOSIS — I10 ESSENTIAL HYPERTENSION: ICD-10-CM

## 2019-10-09 DIAGNOSIS — E78.2 MIXED HYPERLIPIDEMIA: ICD-10-CM

## 2019-10-09 PROCEDURE — G8598 ASA/ANTIPLAT THER USED: HCPCS | Performed by: INTERNAL MEDICINE

## 2019-10-09 PROCEDURE — G8427 DOCREV CUR MEDS BY ELIG CLIN: HCPCS | Performed by: INTERNAL MEDICINE

## 2019-10-09 PROCEDURE — 4040F PNEUMOC VAC/ADMIN/RCVD: CPT | Performed by: INTERNAL MEDICINE

## 2019-10-09 PROCEDURE — G8484 FLU IMMUNIZE NO ADMIN: HCPCS | Performed by: INTERNAL MEDICINE

## 2019-10-09 PROCEDURE — 99213 OFFICE O/P EST LOW 20 MIN: CPT | Performed by: INTERNAL MEDICINE

## 2019-10-09 PROCEDURE — 1123F ACP DISCUSS/DSCN MKR DOCD: CPT | Performed by: INTERNAL MEDICINE

## 2019-10-09 PROCEDURE — 1036F TOBACCO NON-USER: CPT | Performed by: INTERNAL MEDICINE

## 2019-10-09 PROCEDURE — G8419 CALC BMI OUT NRM PARAM NOF/U: HCPCS | Performed by: INTERNAL MEDICINE

## 2019-10-09 PROCEDURE — 3017F COLORECTAL CA SCREEN DOC REV: CPT | Performed by: INTERNAL MEDICINE

## 2019-12-10 DIAGNOSIS — I20.9 ANGINA, CLASS III (HCC): ICD-10-CM

## 2019-12-10 DIAGNOSIS — I10 ESSENTIAL HYPERTENSION: ICD-10-CM

## 2019-12-10 RX ORDER — LISINOPRIL 5 MG/1
5 TABLET ORAL DAILY
Qty: 90 TABLET | Refills: 2 | Status: SHIPPED | OUTPATIENT
Start: 2019-12-10 | End: 2020-11-02 | Stop reason: SDUPTHER

## 2020-01-16 ENCOUNTER — TELEPHONE (OUTPATIENT)
Dept: CARDIOLOGY CLINIC | Age: 70
End: 2020-01-16

## 2020-01-16 NOTE — TELEPHONE ENCOUNTER
The patient called and he is scheduled to have a hernia repair on 1/29/20 and they are asking about holding the ASA 2 tabs daily. Can he hold the 7-10 before?

## 2020-01-22 ENCOUNTER — TELEPHONE (OUTPATIENT)
Dept: CARDIOLOGY CLINIC | Age: 70
End: 2020-01-22

## 2020-01-22 NOTE — TELEPHONE ENCOUNTER
Dr. Colin Kelly at [facility] is asking for an official note clearing the patient for the hernia repair and to hold the Plavix 7 days before. He is on the schedule for 1/29/2020. If they do not have a note today they will cancel the procedure.  He was seen on 10/9/2019

## 2020-01-24 ENCOUNTER — TELEPHONE (OUTPATIENT)
Dept: CARDIOLOGY CLINIC | Age: 70
End: 2020-01-24

## 2020-01-24 NOTE — TELEPHONE ENCOUNTER
I attempted to leave a message re: We need to schedule an appnt with the patient due to Dr. Julien Ramon office is questioning the clearance due to he was in the ED on 11/20/19 for complaints of bilateral arm pain squeezing and he was last seen in the office on 10/19/19. They want to make sure the ED complaint was addressed before they will do the Hernia surgery, per the PAT. The patient does not have voicemail set up yet and I will attempt to reach him and leave a message with alternate phone number. I also left a message withe [facility] nursing desk Leora Acuna at [phone] to alert her that I do not have a provider available until Tuesday since Dr. Pillo Maravilla is out of the office at this time.

## 2020-01-28 ENCOUNTER — OFFICE VISIT (OUTPATIENT)
Dept: CARDIOLOGY CLINIC | Age: 70
End: 2020-01-28
Payer: MEDICARE

## 2020-01-28 VITALS
DIASTOLIC BLOOD PRESSURE: 78 MMHG | SYSTOLIC BLOOD PRESSURE: 118 MMHG | RESPIRATION RATE: 18 BRPM | WEIGHT: 189.6 LBS | BODY MASS INDEX: 27.2 KG/M2 | HEART RATE: 73 BPM | OXYGEN SATURATION: 97 %

## 2020-01-28 PROCEDURE — G8484 FLU IMMUNIZE NO ADMIN: HCPCS | Performed by: INTERNAL MEDICINE

## 2020-01-28 PROCEDURE — G8427 DOCREV CUR MEDS BY ELIG CLIN: HCPCS | Performed by: INTERNAL MEDICINE

## 2020-01-28 PROCEDURE — 93000 ELECTROCARDIOGRAM COMPLETE: CPT | Performed by: INTERNAL MEDICINE

## 2020-01-28 PROCEDURE — 1036F TOBACCO NON-USER: CPT | Performed by: INTERNAL MEDICINE

## 2020-01-28 PROCEDURE — 1123F ACP DISCUSS/DSCN MKR DOCD: CPT | Performed by: INTERNAL MEDICINE

## 2020-01-28 PROCEDURE — G8417 CALC BMI ABV UP PARAM F/U: HCPCS | Performed by: INTERNAL MEDICINE

## 2020-01-28 PROCEDURE — 4040F PNEUMOC VAC/ADMIN/RCVD: CPT | Performed by: INTERNAL MEDICINE

## 2020-01-28 PROCEDURE — 3017F COLORECTAL CA SCREEN DOC REV: CPT | Performed by: INTERNAL MEDICINE

## 2020-01-28 PROCEDURE — 99213 OFFICE O/P EST LOW 20 MIN: CPT | Performed by: INTERNAL MEDICINE

## 2020-01-28 RX ORDER — ATORVASTATIN CALCIUM 80 MG/1
80 TABLET, FILM COATED ORAL DAILY
Qty: 90 TABLET | Refills: 0
Start: 2020-01-28 | End: 2020-11-02 | Stop reason: SDUPTHER

## 2020-01-28 RX ORDER — METOPROLOL SUCCINATE 50 MG/1
50 TABLET, EXTENDED RELEASE ORAL DAILY
Qty: 30 TABLET | Refills: 3 | Status: SHIPPED | OUTPATIENT
Start: 2020-01-28 | End: 2020-04-21

## 2020-01-28 NOTE — PROGRESS NOTES
Tobacco Use    Smoking status: Never Smoker    Smokeless tobacco: Never Used   Substance and Sexual Activity    Alcohol use: Yes     Types: 2 Glasses of wine, 2 Cans of beer per week     Comment: rare    Drug use: No    Sexual activity: None   Lifestyle    Physical activity:     Days per week: None     Minutes per session: None    Stress: None   Relationships    Social connections:     Talks on phone: None     Gets together: None     Attends Congregational service: None     Active member of club or organization: None     Attends meetings of clubs or organizations: None     Relationship status: None    Intimate partner violence:     Fear of current or ex partner: None     Emotionally abused: None     Physically abused: None     Forced sexual activity: None   Other Topics Concern    None   Social History Narrative    None       Allergies   Allergen Reactions    Vancomycin Itching and Swelling       Current Outpatient Medications   Medication Sig Dispense Refill    atorvastatin (LIPITOR) 80 MG tablet Take 1 tablet by mouth daily 90 tablet 0    lisinopril (PRINIVIL;ZESTRIL) 5 MG tablet Take 1 tablet by mouth daily 90 tablet 2    metoprolol tartrate (LOPRESSOR) 25 MG tablet Take 1 tablet by mouth 2 times daily 180 tablet 2    Sildenafil Citrate (VIAGRA PO) Take by mouth      aspirin 81 MG tablet Take 1 tablet by mouth 2 times daily With Food      levothyroxine (SYNTHROID) 112 MCG tablet Take 1 tablet by mouth daily 90 tablet 3     No current facility-administered medications for this visit. Review of Systems:   Review of Systems   Constitutional: Negative for activity change and appetite change. HENT: Negative for congestion. Respiratory: Negative for apnea, choking and chest tightness. Cardiovascular: Negative for chest pain. Gastrointestinal: Negative for abdominal distention and abdominal pain. Endocrine: Negative for cold intolerance and heat intolerance.    Genitourinary: Negative tablet 2    metoprolol tartrate (LOPRESSOR) 25 MG tablet Take 1 tablet by mouth 2 times daily 180 tablet 2    Sildenafil Citrate (VIAGRA PO) Take by mouth      aspirin 81 MG tablet Take 1 tablet by mouth 2 times daily With Food      levothyroxine (SYNTHROID) 112 MCG tablet Take 1 tablet by mouth daily 90 tablet 3     No current facility-administered medications for this visit. Assessment/Plan:    1. Pre-operative cardiovascular examination  Acceptable risk for surgery  - EKG 12 Lead    2. Coronary artery disease involving native coronary artery of native heart without angina pectoris  APT, statin, b-blocker and RF modification      3. Essential hypertension  Patient has essential hypertension on BP medications. The guideline-directed target for BP in this patient is <130/80. In order to reach our target BP we will continue current BP medications, increasing the dose of current meds or adding new to reach target. 4. Mixed hyperlipidemia  The patient has hyperlipidemia without statin intolerance. The patient will be continued on high intensity statin. The labs are managed by PCP. As per recent guidelines, moderate dose high intensity statin is indicated. Counseling: the patient was counseled regarding diet, exercise, weight control and heart healthy lifestyle. Return in about 3 months (around 4/28/2020) for followup cv disease. Electronically signed by   Kim Stoddard.  Oswald Bear MD [facility] Director of Cardiology Services and Cardiac Catheterization Laboratory  [facility], [place]    on 1/28/2020 at 11:02 AM

## 2020-04-08 ENCOUNTER — HOSPITAL ENCOUNTER (INPATIENT)
Age: 70
LOS: 1 days | Discharge: HOME | DRG: 287 | End: 2020-04-08
Attending: NEUROMUSCULOSKELETAL MEDICINE, SPORTS MEDICINE | Admitting: INTERNAL MEDICINE
Payer: MEDICARE

## 2020-04-08 ENCOUNTER — APPOINTMENT (OUTPATIENT)
Dept: GENERAL RADIOLOGY | Age: 70
DRG: 287 | End: 2020-04-08
Payer: MEDICARE

## 2020-04-08 ENCOUNTER — APPOINTMENT (OUTPATIENT)
Dept: CARDIAC CATH/INVASIVE PROCEDURES | Age: 70
DRG: 287 | End: 2020-04-08
Payer: MEDICARE

## 2020-04-08 VITALS
HEART RATE: 61 BPM | WEIGHT: 182 LBS | RESPIRATION RATE: 15 BRPM | DIASTOLIC BLOOD PRESSURE: 71 MMHG | BODY MASS INDEX: 26.96 KG/M2 | OXYGEN SATURATION: 97 % | HEIGHT: 69 IN | TEMPERATURE: 97.7 F | SYSTOLIC BLOOD PRESSURE: 127 MMHG

## 2020-04-08 PROBLEM — R07.9 CHEST PAIN: Status: ACTIVE | Noted: 2020-04-08

## 2020-04-08 LAB
ANION GAP SERPL CALCULATED.3IONS-SCNC: 14 MEQ/L (ref 9–15)
BASOPHILS ABSOLUTE: 0.1 K/UL (ref 0–0.2)
BASOPHILS RELATIVE PERCENT: 0.7 %
BUN BLDV-MCNC: 16 MG/DL (ref 8–23)
CALCIUM SERPL-MCNC: 8.7 MG/DL (ref 8.5–9.9)
CHLORIDE BLD-SCNC: 100 MEQ/L (ref 95–107)
CO2: 21 MEQ/L (ref 20–31)
CREAT SERPL-MCNC: 0.86 MG/DL (ref 0.7–1.2)
EKG ATRIAL RATE: 63 BPM
EKG ATRIAL RATE: 76 BPM
EKG P AXIS: 12 DEGREES
EKG P AXIS: 19 DEGREES
EKG P-R INTERVAL: 240 MS
EKG P-R INTERVAL: 262 MS
EKG Q-T INTERVAL: 392 MS
EKG Q-T INTERVAL: 434 MS
EKG QRS DURATION: 104 MS
EKG QRS DURATION: 104 MS
EKG QTC CALCULATION (BAZETT): 441 MS
EKG QTC CALCULATION (BAZETT): 444 MS
EKG R AXIS: -10 DEGREES
EKG R AXIS: -2 DEGREES
EKG T AXIS: 7 DEGREES
EKG T AXIS: 9 DEGREES
EKG VENTRICULAR RATE: 63 BPM
EKG VENTRICULAR RATE: 76 BPM
EOSINOPHILS ABSOLUTE: 0.9 K/UL (ref 0–0.7)
EOSINOPHILS RELATIVE PERCENT: 10.7 %
GFR AFRICAN AMERICAN: >60
GFR NON-AFRICAN AMERICAN: >60
GLUCOSE BLD-MCNC: 116 MG/DL (ref 70–99)
HCT VFR BLD CALC: 43.6 % (ref 42–52)
HEMOGLOBIN: 14.7 G/DL (ref 14–18)
LYMPHOCYTES ABSOLUTE: 2.3 K/UL (ref 1–4.8)
LYMPHOCYTES RELATIVE PERCENT: 28.1 %
MCH RBC QN AUTO: 32.7 PG (ref 27–31.3)
MCHC RBC AUTO-ENTMCNC: 33.8 % (ref 33–37)
MCV RBC AUTO: 96.8 FL (ref 80–100)
MONOCYTES ABSOLUTE: 1.5 K/UL (ref 0.2–0.8)
MONOCYTES RELATIVE PERCENT: 18.6 %
NEUTROPHILS ABSOLUTE: 3.5 K/UL (ref 1.4–6.5)
NEUTROPHILS RELATIVE PERCENT: 41.9 %
PDW BLD-RTO: 14.6 % (ref 11.5–14.5)
PLATELET # BLD: 218 K/UL (ref 130–400)
POTASSIUM SERPL-SCNC: 3.7 MEQ/L (ref 3.4–4.9)
RBC # BLD: 4.51 M/UL (ref 4.7–6.1)
SODIUM BLD-SCNC: 135 MEQ/L (ref 135–144)
TROPONIN: <0.01 NG/ML (ref 0–0.01)
WBC # BLD: 8.3 K/UL (ref 4.8–10.8)

## 2020-04-08 PROCEDURE — 6360000002 HC RX W HCPCS: Performed by: INTERNAL MEDICINE

## 2020-04-08 PROCEDURE — 71045 X-RAY EXAM CHEST 1 VIEW: CPT

## 2020-04-08 PROCEDURE — 4A023N7 MEASUREMENT OF CARDIAC SAMPLING AND PRESSURE, LEFT HEART, PERCUTANEOUS APPROACH: ICD-10-PCS | Performed by: INTERNAL MEDICINE

## 2020-04-08 PROCEDURE — 84484 ASSAY OF TROPONIN QUANT: CPT

## 2020-04-08 PROCEDURE — 99223 1ST HOSP IP/OBS HIGH 75: CPT | Performed by: INTERNAL MEDICINE

## 2020-04-08 PROCEDURE — 2060000000 HC ICU INTERMEDIATE R&B

## 2020-04-08 PROCEDURE — 2709999900 HC NON-CHARGEABLE SUPPLY

## 2020-04-08 PROCEDURE — C1894 INTRO/SHEATH, NON-LASER: HCPCS

## 2020-04-08 PROCEDURE — 2580000003 HC RX 258

## 2020-04-08 PROCEDURE — C1725 CATH, TRANSLUMIN NON-LASER: HCPCS

## 2020-04-08 PROCEDURE — 93005 ELECTROCARDIOGRAM TRACING: CPT | Performed by: INTERNAL MEDICINE

## 2020-04-08 PROCEDURE — 99285 EMERGENCY DEPT VISIT HI MDM: CPT

## 2020-04-08 PROCEDURE — 6360000002 HC RX W HCPCS

## 2020-04-08 PROCEDURE — 85025 COMPLETE CBC W/AUTO DIFF WBC: CPT

## 2020-04-08 PROCEDURE — 93005 ELECTROCARDIOGRAM TRACING: CPT | Performed by: NEUROMUSCULOSKELETAL MEDICINE, SPORTS MEDICINE

## 2020-04-08 PROCEDURE — 80048 BASIC METABOLIC PNL TOTAL CA: CPT

## 2020-04-08 PROCEDURE — C1769 GUIDE WIRE: HCPCS

## 2020-04-08 PROCEDURE — 2580000003 HC RX 258: Performed by: INTERNAL MEDICINE

## 2020-04-08 PROCEDURE — 96372 THER/PROPH/DIAG INJ SC/IM: CPT

## 2020-04-08 PROCEDURE — B2111ZZ FLUOROSCOPY OF MULTIPLE CORONARY ARTERIES USING LOW OSMOLAR CONTRAST: ICD-10-PCS | Performed by: INTERNAL MEDICINE

## 2020-04-08 PROCEDURE — 93459 L HRT ART/GRFT ANGIO: CPT | Performed by: INTERNAL MEDICINE

## 2020-04-08 PROCEDURE — 6360000004 HC RX CONTRAST MEDICATION: Performed by: INTERNAL MEDICINE

## 2020-04-08 PROCEDURE — B2151ZZ FLUOROSCOPY OF LEFT HEART USING LOW OSMOLAR CONTRAST: ICD-10-PCS | Performed by: INTERNAL MEDICINE

## 2020-04-08 PROCEDURE — 2500000003 HC RX 250 WO HCPCS

## 2020-04-08 PROCEDURE — APPNB45 APP NON BILLABLE 31-45 MINUTES: Performed by: PHYSICIAN ASSISTANT

## 2020-04-08 PROCEDURE — 36415 COLL VENOUS BLD VENIPUNCTURE: CPT

## 2020-04-08 PROCEDURE — B2121ZZ FLUOROSCOPY OF SINGLE CORONARY ARTERY BYPASS GRAFT USING LOW OSMOLAR CONTRAST: ICD-10-PCS | Performed by: INTERNAL MEDICINE

## 2020-04-08 PROCEDURE — 6370000000 HC RX 637 (ALT 250 FOR IP): Performed by: INTERNAL MEDICINE

## 2020-04-08 RX ORDER — SODIUM CHLORIDE 0.9 % (FLUSH) 0.9 %
10 SYRINGE (ML) INJECTION PRN
Status: DISCONTINUED | OUTPATIENT
Start: 2020-04-08 | End: 2020-04-08

## 2020-04-08 RX ORDER — LORAZEPAM 1 MG/1
1 TABLET ORAL ONCE
Status: DISCONTINUED | OUTPATIENT
Start: 2020-04-08 | End: 2020-04-08 | Stop reason: HOSPADM

## 2020-04-08 RX ORDER — PROMETHAZINE HYDROCHLORIDE 25 MG/1
12.5 TABLET ORAL EVERY 6 HOURS PRN
Status: DISCONTINUED | OUTPATIENT
Start: 2020-04-08 | End: 2020-04-08 | Stop reason: HOSPADM

## 2020-04-08 RX ORDER — LEVOTHYROXINE SODIUM 112 UG/1
112 TABLET ORAL
Status: DISCONTINUED | OUTPATIENT
Start: 2020-04-08 | End: 2020-04-08 | Stop reason: HOSPADM

## 2020-04-08 RX ORDER — SODIUM CHLORIDE 9 MG/ML
INJECTION, SOLUTION INTRAVENOUS CONTINUOUS
Status: DISCONTINUED | OUTPATIENT
Start: 2020-04-08 | End: 2020-04-08 | Stop reason: HOSPADM

## 2020-04-08 RX ORDER — METOPROLOL TARTRATE 5 MG/5ML
5 INJECTION INTRAVENOUS ONCE
Status: DISCONTINUED | OUTPATIENT
Start: 2020-04-08 | End: 2020-04-08 | Stop reason: HOSPADM

## 2020-04-08 RX ORDER — ACETAMINOPHEN 325 MG/1
650 TABLET ORAL EVERY 6 HOURS PRN
Status: DISCONTINUED | OUTPATIENT
Start: 2020-04-08 | End: 2020-04-08 | Stop reason: HOSPADM

## 2020-04-08 RX ORDER — LISINOPRIL 5 MG/1
5 TABLET ORAL DAILY
Status: DISCONTINUED | OUTPATIENT
Start: 2020-04-08 | End: 2020-04-08 | Stop reason: HOSPADM

## 2020-04-08 RX ORDER — ATORVASTATIN CALCIUM 40 MG/1
40 TABLET, FILM COATED ORAL NIGHTLY
Status: DISCONTINUED | OUTPATIENT
Start: 2020-04-08 | End: 2020-04-08 | Stop reason: ALTCHOICE

## 2020-04-08 RX ORDER — SODIUM CHLORIDE 9 MG/ML
INJECTION, SOLUTION INTRAVENOUS CONTINUOUS
Status: DISCONTINUED | OUTPATIENT
Start: 2020-04-08 | End: 2020-04-08

## 2020-04-08 RX ORDER — ASPIRIN 81 MG/1
81 TABLET, CHEWABLE ORAL DAILY
Status: DISCONTINUED | OUTPATIENT
Start: 2020-04-09 | End: 2020-04-08 | Stop reason: ALTCHOICE

## 2020-04-08 RX ORDER — SODIUM CHLORIDE 0.9 % (FLUSH) 0.9 %
10 SYRINGE (ML) INJECTION EVERY 12 HOURS SCHEDULED
Status: DISCONTINUED | OUTPATIENT
Start: 2020-04-08 | End: 2020-04-08

## 2020-04-08 RX ORDER — ATORVASTATIN CALCIUM 80 MG/1
80 TABLET, FILM COATED ORAL NIGHTLY
Status: DISCONTINUED | OUTPATIENT
Start: 2020-04-08 | End: 2020-04-08 | Stop reason: HOSPADM

## 2020-04-08 RX ORDER — METOPROLOL SUCCINATE 50 MG/1
50 TABLET, EXTENDED RELEASE ORAL DAILY
Status: DISCONTINUED | OUTPATIENT
Start: 2020-04-08 | End: 2020-04-08 | Stop reason: HOSPADM

## 2020-04-08 RX ORDER — ISOSORBIDE MONONITRATE 30 MG/1
30 TABLET, EXTENDED RELEASE ORAL DAILY
Qty: 30 TABLET | Refills: 3 | Status: SHIPPED | OUTPATIENT
Start: 2020-04-08 | End: 2020-04-22 | Stop reason: ALTCHOICE

## 2020-04-08 RX ORDER — ONDANSETRON 2 MG/ML
4 INJECTION INTRAMUSCULAR; INTRAVENOUS EVERY 6 HOURS PRN
Status: DISCONTINUED | OUTPATIENT
Start: 2020-04-08 | End: 2020-04-08 | Stop reason: HOSPADM

## 2020-04-08 RX ORDER — IODIXANOL 320 MG/ML
100 INJECTION, SOLUTION INTRAVASCULAR ONCE
Status: COMPLETED | OUTPATIENT
Start: 2020-04-08 | End: 2020-04-08

## 2020-04-08 RX ORDER — ACETAMINOPHEN 650 MG/1
650 SUPPOSITORY RECTAL EVERY 6 HOURS PRN
Status: DISCONTINUED | OUTPATIENT
Start: 2020-04-08 | End: 2020-04-08 | Stop reason: HOSPADM

## 2020-04-08 RX ORDER — ASPIRIN 81 MG/1
81 TABLET, CHEWABLE ORAL 2 TIMES DAILY
Status: DISCONTINUED | OUTPATIENT
Start: 2020-04-08 | End: 2020-04-08 | Stop reason: HOSPADM

## 2020-04-08 RX ADMIN — LEVOTHYROXINE SODIUM 112 MCG: 112 TABLET ORAL at 06:46

## 2020-04-08 RX ADMIN — IODIXANOL 150 ML: 320 INJECTION, SOLUTION INTRAVASCULAR at 15:30

## 2020-04-08 RX ADMIN — ASPIRIN 81 MG 81 MG: 81 TABLET ORAL at 10:00

## 2020-04-08 RX ADMIN — LISINOPRIL 5 MG: 5 TABLET ORAL at 09:59

## 2020-04-08 RX ADMIN — Medication 10 ML: at 10:00

## 2020-04-08 RX ADMIN — ENOXAPARIN SODIUM 40 MG: 40 INJECTION SUBCUTANEOUS at 10:01

## 2020-04-08 RX ADMIN — METOPROLOL SUCCINATE 50 MG: 50 TABLET, EXTENDED RELEASE ORAL at 09:59

## 2020-04-08 RX ADMIN — SODIUM CHLORIDE: 9 INJECTION, SOLUTION INTRAVENOUS at 13:22

## 2020-04-08 ASSESSMENT — ENCOUNTER SYMPTOMS
TROUBLE SWALLOWING: 0
EYE PAIN: 0
CHEST TIGHTNESS: 0
SHORTNESS OF BREATH: 0
ABDOMINAL DISTENTION: 0
CHEST TIGHTNESS: 1
COLOR CHANGE: 0
NAUSEA: 0
COUGH: 0
SHORTNESS OF BREATH: 1
APNEA: 0
CHOKING: 0
ABDOMINAL PAIN: 0
VOMITING: 0
STRIDOR: 0
EYE DISCHARGE: 0
WHEEZING: 0

## 2020-04-08 ASSESSMENT — PAIN SCALES - GENERAL: PAINLEVEL_OUTOF10: 0

## 2020-04-08 NOTE — ED TRIAGE NOTES
Pt arrived via [address] [place] [address],[address] from home c/o chest pain. Approx 40 min PTA pt was starting generator because of power outage and experienced mid sternal chest pain. Pt took 2 baby aspirin.   Denies any chest pain upon arrival.

## 2020-04-08 NOTE — PROGRESS NOTES
Right groin remains stable, distal pulses are 2+ on the distal DP/PT.    Patient tolerating PO fluids well, Report called to RN [address] transport back to bed

## 2020-04-08 NOTE — FLOWSHEET NOTE
Pt to floor at approx 0300. Vitals stable. NSR on monitor. Pt ambulating in room with no distress. Denies CP right now. Reports that he has been more worn out and SOB the past few weeks. Call light within reach. Team to continue to monitor.  Electronically signed by Lisy Arellano RN on 4/8/2020 at 4:02 AM

## 2020-04-08 NOTE — FLOWSHEET NOTE
Lab informed that 2nd trop was never drawn despite order being in correctly. They reported they would let the phlebotomist know.  Electronically signed by Kelsie Jaramillo RN on 4/8/2020 at 6:09 AM

## 2020-04-08 NOTE — DISCHARGE INSTR - DIET
 Good nutrition is important when healing from an illness, injury, or surgery. Follow any nutrition recommendations given to you during your hospital stay.  If you were given an oral nutrition supplement while in the hospital, continue to take this supplement at home. You can take it with meals, in-between meals, and/or before bedtime. These supplements can be purchased at most local grocery stores, pharmacies, and chain super-stores.  If you have any questions about your diet or nutrition, call the hospital and ask for the dietitian.  Oral hydration encouraged. Shantal Del Real MD        oxyCODONE (ROXICODONE) immediate release tablet 2.5 mg  2.5 mg Oral TID PRN Shantal Del Real MD   2.5 mg at 07/24/19 1368    pregabalin (LYRICA) capsule 25 mg  25 mg Oral BID Shantal Del Real MD   25 mg at 07/24/19 0945    sodium bicarbonate tablet 650 mg  650 mg Oral BID Shantal Del Real MD   650 mg at 07/24/19 0945    vitamin B-12 (CYANOCOBALAMIN) tablet 1,000 mcg  1,000 mcg Oral Daily Shantal Del Real MD   1,000 mcg at 07/24/19 0945    calcium-vitamin D (OSCAL-500) 500-200 MG-UNIT per tablet 1 tablet  1 tablet Oral BID Shantal Del Real MD   1 tablet at 07/24/19 0945    sodium chloride flush 0.9 % injection 10 mL  10 mL Intravenous 2 times per day Shantal Del Real MD   10 mL at 07/24/19 0946    sodium chloride flush 0.9 % injection 10 mL  10 mL Intravenous PRN Shantal Del Real MD   10 mL at 07/24/19 0720    magnesium hydroxide (MILK OF MAGNESIA) 400 MG/5ML suspension 30 mL  30 mL Oral Daily PRN Shantal Del Real MD        ondansetron [facility]) injection 4 mg  4 mg Intravenous Q6H PRN Shantal Del Real MD        glucose (GLUTOSE) 40 % oral gel 15 g  15 g Oral PRN Shantal Del Real MD        dextrose 50 % IV solution  12.5 g Intravenous PRN Shantal Del Real MD        glucagon (rDNA) injection 1 mg  1 mg Intramuscular PRN Shantal Del Real MD        dextrose 5 % solution  100 mL/hr Intravenous PRN Shantal Del Real MD          dextrose         Physical Exam:  BP (!) 114/59   Pulse 94   Temp 98.9 °F (37.2 °C) (Temporal)   Resp 16   Ht 5' 4\" (1.626 m)   Wt 206 lb 6 oz (93.6 kg)   SpO2 99%   BMI 35.42 kg/m²   Wt Readings from Last 3 Encounters:   07/23/19 206 lb 6 oz (93.6 kg)   06/26/19 217 lb 9.6 oz (98.7 kg)   06/17/19 217 lb (98.4 kg)     Appearance: Awake, alert, no acute respiratory distress. She looks very pale.    Skin: Intact, no rash  Head: Normocephalic, atraumatic  Eyes: EOMI, no conjunctival erythema  ENMT: No pharyngeal erythema, MMM, no Component Value Date    TSH 6.930 (H) 06/10/2019     Lab Results   Component Value Date    LABA1C 5.9 (H) 07/21/2019     No results found for: EAG  Lab Results   Component Value Date    CHOL 80 06/10/2019    CHOL 91 11/30/2018    CHOL 99 05/16/2018     Lab Results   Component Value Date    TRIG 104 06/10/2019    TRIG 84 11/30/2018    TRIG 117 05/16/2018     Lab Results   Component Value Date    HDL 31 06/10/2019    HDL 37 11/30/2018    HDL 32 05/16/2018     Lab Results   Component Value Date    LDLCALC 28 06/10/2019    LDLCALC 37 11/30/2018    LDLCALC 44 05/16/2018     Lab Results   Component Value Date    LABVLDL 21 06/10/2019    LABVLDL 17 11/30/2018    LABVLDL 23 05/16/2018     No results found for: CHOLHDLRATIO    Cardiac Tests:  Telemetry findings reviewed: Sinus rhythm with heart rate currently at 60s, no new tachy/bradyarrhythmias overnight     EKG: Sinus tachycardia, right bundle branch block, left anterior fascicular block, bifascicular block, inferior MI age undetermined, abnormal EKG.     Labs were reviewed: H&H 6.1/20.8>> 7.7/26.8>>6.3/21.8, WBC 1.6, platelets 61, albumin 2.9 rest of the liver functions are normal.  Bun/creatinine 29/2.1>>27/2.0>> 27/2, electrolytes were normal, proBNP 32, 255 >>30, 866, troponin 0 0.07. Vitals: Blood pressure 114/59, heart rate 94, temp 98.9. TTE- 7/23/2019  Limited study to assess LV function.   Left ventricle size is normal.   Ejection fraction is visually estimated at 30-35%.   Overall ejection fraction severely decreased .   There is severe global hypokinesis.   Mild concentric left ventricular hypertrophy.   Abnormal diastolic function   Right ventricle global systolic function is mildly reduced .   Moderate mitral annular calcification.   No evidence for hemodynamically significant pericardial effusion.   Moderate left pleural effusion.   Compared to prior echo of 6/14/2019, changes noted.  Now, there is severe   LV dysfunction.     Assessment:  · Dyspnea

## 2020-04-08 NOTE — H&P
ADDITION H&P/CONSULT    Chief Complaint: chest pain    History of Present Illness: 78year old male with known diffuse severe CAD, remote ICM, CABG. Normalized EF. Here after some exertion caused chest pain, has been having some episodes recently. Associated shortness of breath. Typical for his cardiac pain. Review of Systems:   Review of Systems   Constitutional: Negative. HENT: Negative for congestion and trouble swallowing. Eyes: Negative for pain, discharge and visual disturbance. Respiratory: Positive for shortness of breath. Negative for apnea, cough, choking, chest tightness, wheezing and stridor. Cardiovascular: Positive for chest pain. Negative for palpitations and leg swelling. Gastrointestinal: Negative for abdominal distention, abdominal pain and nausea. Endocrine: Negative for cold intolerance and heat intolerance. Genitourinary: Negative for difficulty urinating. Musculoskeletal: Negative for arthralgias and joint swelling. Skin: Negative for color change and pallor. Allergic/Immunologic: Negative for immunocompromised state. Neurological: Negative for dizziness, seizures, syncope, facial asymmetry, speech difficulty, weakness, light-headedness, numbness and headaches. Hematological: Negative for adenopathy. Psychiatric/Behavioral: Negative for agitation, behavioral problems and confusion. Physical Examination:    /76   Pulse 56   Temp 97.5 °F (36.4 °C) (Oral)   Resp 18   Ht 5' 9\" (1.753 m)   Wt 182 lb (82.6 kg)   SpO2 99%   BMI 26.88 kg/m²    Physical Exam   Constitutional: He appears healthy. No distress. HENT:   Mouth/Throat: Oropharynx is clear. Eyes: Pupils are equal, round, and reactive to light. Neck: Normal range of motion. No JVD present. Cardiovascular: Regular rhythm, S1 normal, S2 normal, normal heart sounds and intact distal pulses. Exam reveals no gallop. No murmur heard.   Pulses:       Radial pulses are 2+ on the right

## 2020-04-08 NOTE — ED NOTES
LS CTA. Resp even and unlabored. No distress noted. Pt denies any SOB, N, sweating.      Maryam Rush RN  74/96/43 [id number]

## 2020-04-08 NOTE — ED PROVIDER NOTES
and negative. PAST MEDICAL HISTORY     Past Medical History:   Diagnosis Date    CAD (coronary artery disease)     Eczema     Hyperlipidemia     Hypertension     Hypothyroidism          SURGICAL HISTORY       Past Surgical History:   Procedure Laterality Date    COLONOSCOPY  10/01/14    DR. Morocho    CORONARY ARTERY BYPASS GRAFT  10/11/2018    DR. DUMONT    CORONARY ARTERY BYPASS GRAFT  10/16/2018    DR. DUMONT    CORONARY ARTERY BYPASS GRAFT      KNEE SURGERY      left    VASECTOMY           CURRENTMEDICATIONS       Previous Medications    ASPIRIN 81 MG TABLET    Take 1 tablet by mouth 2 times daily With Food    ATORVASTATIN (LIPITOR) 80 MG TABLET    Take 1 tablet by mouth daily    LEVOTHYROXINE (SYNTHROID) 112 MCG TABLET    Take 1 tablet by mouth daily    LISINOPRIL (PRINIVIL;ZESTRIL) 5 MG TABLET    Take 1 tablet by mouth daily    METOPROLOL SUCCINATE (TOPROL XL) 50 MG EXTENDED RELEASE TABLET    Take 1 tablet by mouth daily    SILDENAFIL CITRATE (VIAGRA PO)    Take by mouth       ALLERGIES     Vancomycin    FAMILY HISTORY       Family History   Problem Relation Age of Onset    High Blood Pressure Mother     High Cholesterol Father     Other Father         thyroid    Other Sister         thyroid    Other Brother         throid          SOCIAL HISTORY       Social History     Socioeconomic History    Marital status:      Spouse name: Not on file    Number of children: Not on file    Years of education: Not on file    Highest education level: Not on file   Occupational History    Occupation: Electical      Comment: [employer]   Social Needs    Financial resource strain: Not on file    Food insecurity     Worry: Not on file     Inability: Not on file   [employer] needs     Medical: Not on file     Non-medical: Not on file   Tobacco Use    Smoking status: Never Smoker    Smokeless tobacco: Never Used   Substance and Sexual Activity    Alcohol use: Yes     Types: 2 Effort: Pulmonary effort is normal.      Breath sounds: Normal breath sounds. Abdominal:      General: Abdomen is flat. Bowel sounds are normal.      Palpations: Abdomen is soft. Musculoskeletal: Normal range of motion. Skin:     General: Skin is warm and dry. Capillary Refill: Capillary refill takes less than 2 seconds. Neurological:      General: No focal deficit present. Mental Status: He is alert and oriented for age. Psychiatric:         Mood and Affect: Mood normal.         Behavior: Behavior normal.         Thought Content: Thought content normal.         Judgment: Judgment normal.         MDM  Results for Isra Daniel" (MRN [id number]) as of 4/8/2020 01:51   Ref.  Range 4/8/2020 00:30   Sodium Latest Ref Range: 135 - 144 mEq/L 135   Potassium Latest Ref Range: 3.4 - 4.9 mEq/L 3.7   Chloride Latest Ref Range: 95 - 107 mEq/L 100   CO2 Latest Ref Range: 20 - 31 mEq/L 21   BUN Latest Ref Range: 8 - 23 mg/dL 16   Creatinine Latest Ref Range: 0.70 - 1.20 mg/dL 0.86   Anion Gap Latest Ref Range: 9 - 15 mEq/L 14   GFR Non- Latest Ref Range: >60  >60.0   GFR African American Latest Ref Range: >60  >60.0   Glucose Latest Ref Range: 70 - 99 mg/dL 116 (H)   Calcium Latest Ref Range: 8.5 - 9.9 mg/dL 8.7   Troponin Latest Ref Range: 0.000 - 0.010 ng/mL <0.010   WBC Latest Ref Range: 4.8 - 10.8 K/uL 8.3   RBC Latest Ref Range: 4.70 - 6.10 M/uL 4.51 (L)   Hemoglobin Quant Latest Ref Range: 14.0 - 18.0 g/dL 14.7   Hematocrit Latest Ref Range: 42.0 - 52.0 % 43.6   MCV Latest Ref Range: 80.0 - 100.0 fL 96.8   MCH Latest Ref Range: 27.0 - 31.3 pg 32.7 (H)   MCHC Latest Ref Range: 33.0 - 37.0 % 33.8   RDW Latest Ref Range: 11.5 - 14.5 % 14.6 (H)   Platelet Count Latest Ref Range: 130 - 400 K/uL 218   Neutrophils % Latest Units: % 41.9   Lymphocyte % Latest Units: % 28.1   Monocytes % Latest Units: % 18.6   Eosinophils % Latest Units: % 10.7   Basophils % Latest Units: % 0.7

## 2020-04-08 NOTE — ED NOTES
Pt denies any anxiety, SOB. Pt refusing Ativan.  Current  76 P 70  Medication held     [address] [place] [address]  12/68/84 [id number]

## 2020-04-08 NOTE — FLOWSHEET NOTE
Pt R femoral cath site stable and distal pulses palpable. Pt complains of no pain at this time. Vitals stable, will continue to monitor.   Electronically signed by Liliana Goel RN on 4/8/2020 at 6:11 PM

## 2020-04-08 NOTE — PROGRESS NOTES
Received into Pre and Post cath lab. No complaints offered at present time. Is alert land oriented times three. Denies chest pain.

## 2020-04-08 NOTE — CARE COORDINATION
[facility] Case Management Initial Discharge Assessment    Met with Patient to discuss discharge plan. PCP: Elizabeth Sales MD                                Date of Last Visit: WITH IN 3 MO    If no PCP, list provided? N/A    Discharge Planning    Living Arrangements: independently at home    Who do you live with? SPOUSE    Who helps you with your care:  self    If lives at home:     Do you have any barriers navigating in your home? no    Patient can perform ADL? Yes    Current Services (outpatient and in home) :  None    Dialysis: No    Is transportation available to get to your appointments? Yes    DME Equipment:  no    Respiratory equipment: None    Respiratory provider:  no     Pharmacy:  yes - [facility]    Consult with Medication Assistance Program?  No      Patient agreeable to [facility]? Declined    Patient agreeable to SNF/Rehab? N/A    Other discharge needs identified? N/A    Freedom of choice list provided with basic dialogue that supports the patient's individualized plan of care/goals and shares the quality data associated with the providers. Yes    Does Patient Have a High-Risk for Readmission Diagnosis (CHF, PN, MI, COPD)? No    The plan for Transition of Care is related to the following treatment goals:CP FREE    Initial Discharge Plan? (Note: please see concurrent daily documentation for any updates after initial note).     HOME, DENIES NEEDS    PT IS LOW RISK FOR READMIT AND NO ADVANCED DIRECTIVES PER SHAHNAZ    The Patient and/or patient representative: PATIENT was provided with choice of any post-acute providers for care and equipment and agrees with discharge plan  Yes    Electronically signed by Javier Capps RN on 4/8/2020 at 2:13 PM

## 2020-04-21 RX ORDER — METOPROLOL SUCCINATE 50 MG/1
TABLET, EXTENDED RELEASE ORAL
Qty: 90 TABLET | Refills: 1 | Status: SHIPPED | OUTPATIENT
Start: 2020-04-21 | End: 2020-10-27

## 2020-04-22 ENCOUNTER — VIRTUAL VISIT (OUTPATIENT)
Dept: CARDIOLOGY CLINIC | Age: 70
End: 2020-04-22
Payer: MEDICARE

## 2020-04-22 PROCEDURE — 1111F DSCHRG MED/CURRENT MED MERGE: CPT | Performed by: INTERNAL MEDICINE

## 2020-04-22 PROCEDURE — G8428 CUR MEDS NOT DOCUMENT: HCPCS | Performed by: INTERNAL MEDICINE

## 2020-04-22 PROCEDURE — G8417 CALC BMI ABV UP PARAM F/U: HCPCS | Performed by: INTERNAL MEDICINE

## 2020-04-22 PROCEDURE — 1036F TOBACCO NON-USER: CPT | Performed by: INTERNAL MEDICINE

## 2020-04-22 PROCEDURE — 3017F COLORECTAL CA SCREEN DOC REV: CPT | Performed by: INTERNAL MEDICINE

## 2020-04-22 PROCEDURE — 1123F ACP DISCUSS/DSCN MKR DOCD: CPT | Performed by: INTERNAL MEDICINE

## 2020-04-22 PROCEDURE — 99213 OFFICE O/P EST LOW 20 MIN: CPT | Performed by: INTERNAL MEDICINE

## 2020-04-22 PROCEDURE — 4040F PNEUMOC VAC/ADMIN/RCVD: CPT | Performed by: INTERNAL MEDICINE

## 2020-04-22 NOTE — PROGRESS NOTES
2020    TELEHEALTH EVALUATION -- Audio/Visual (During WTWVE-33 public health emergency)    Due to Nereida 19 outbreak, patient's office visit was converted to a virtual visit. Patient was contacted and agreed to proceed with a virtual visit via [email or web address]  The risks and benefits of converting to a virtual visit were discussed in light of the current infectious disease epidemic. Patient also understood that insurance coverage and co-pays are up to their individual insurance plans. HPI:    Jorge Arredondo (:  1950) has requested an audio/video evaluation for the following concern(s):    Cad, prior CABG, recent unstable angina. Cath was negative, patent bypasses. No further angina. Having headaches from imdur. Review of Systems    Prior to Visit Medications    Medication Sig Taking? Authorizing Provider   metoprolol succinate (TOPROL XL) 50 MG extended release tablet TAKE 1 TABLET BY MOUTH EVERY DAY  Stiven Key MD   atorvastatin (LIPITOR) 80 MG tablet Take 1 tablet by mouth daily  Stiven Key MD   lisinopril (PRINIVIL;ZESTRIL) 5 MG tablet Take 1 tablet by mouth daily  Stiven Key MD   aspirin 81 MG tablet Take 1 tablet by mouth 2 times daily With [address], MD   levothyroxine (SYNTHROID) 112 MCG tablet Take 1 tablet by mouth daily  Vipin Zamora MD       Social History     Tobacco Use    Smoking status: Never Smoker    Smokeless tobacco: Never Used   Substance Use Topics    Alcohol use: Yes     Types: 2 Glasses of wine, 2 Cans of beer per week     Comment: rare    Drug use: No        Allergies   Allergen Reactions    Vancomycin Itching and Swelling   ,   Past Medical History:   Diagnosis Date    CAD (coronary artery disease)     Eczema     Hyperlipidemia     Hypertension     Hypothyroidism    ,   Past Surgical History:   Procedure Laterality Date    COLONOSCOPY  10/01/14    DR. Sheldon Glass    CORONARY ARTERY BYPASS GRAFT  10/11/2018    DR. Pablo Craft  CORONARY ARTERY BYPASS GRAFT      KNEE SURGERY      left    VASECTOMY     ,   Social History     Tobacco Use    Smoking status: Never Smoker    Smokeless tobacco: Never Used   Substance Use Topics    Alcohol use: Yes     Types: 2 Glasses of wine, 2 Cans of beer per week     Comment: rare    Drug use: No   ,   Family History   Problem Relation Age of Onset    High Blood Pressure Mother     High Cholesterol Father     Other Father         thyroid    Other Sister         thyroid    Other Brother         throid   ,   Immunization History   Administered Date(s) Administered    Pneumococcal Polysaccharide (Nemswweow41) 09/09/2013, 01/13/2015    Td, unspecified formulation 07/21/2006    Tdap (Boostrix, Adacel) 01/19/2018   ,   Health Maintenance   Topic Date Due    Hepatitis C screen  1950    Shingles Vaccine (1 of 2) 07/15/1999    Lipid screen  05/23/2019    Annual Wellness Visit (AWV)  05/29/2019    A1C test (Diabetic or Prediabetic)  02/19/2020    TSH testing  02/19/2020    Flu vaccine (Season Ended) 09/01/2020    Potassium monitoring  04/08/2021    Creatinine monitoring  04/08/2021    Colon cancer screen colonoscopy  10/01/2024    DTaP/Tdap/Td vaccine (2 - Td) 01/19/2028    Pneumococcal 65+ years Vaccine  Completed    Hepatitis A vaccine  Aged Out    Hepatitis B vaccine  Aged Out    Hib vaccine  Aged Out    Meningococcal (ACWY) vaccine  Aged Out       PHYSICAL EXAMINATION:  [ INSTRUCTIONS:  \"[x]\" Indicates a positive item  \"[]\" Indicates a negative item  -- DELETE ALL ITEMS NOT EXAMINED]  [] Alert  [] Oriented to person/place/time    [] No apparent distress  [] Toxic appearing    [] Face flushed appearing [] Sclera clear  [] Lips are cyanotic      [] Breathing appears normal  [] Appears tachypneic      [] Rash on visible skin    [] Cranial Nerves II-XII grossly intact    [] Motor grossly intact in visible upper extremities    [] Motor grossly intact in visible lower

## 2020-07-01 ENCOUNTER — OFFICE VISIT (OUTPATIENT)
Dept: CARDIOLOGY CLINIC | Age: 70
End: 2020-07-01
Payer: MEDICARE

## 2020-07-01 VITALS
OXYGEN SATURATION: 97 % | SYSTOLIC BLOOD PRESSURE: 122 MMHG | HEART RATE: 64 BPM | RESPIRATION RATE: 16 BRPM | WEIGHT: 188 LBS | DIASTOLIC BLOOD PRESSURE: 76 MMHG | BODY MASS INDEX: 27.76 KG/M2

## 2020-07-01 PROCEDURE — 1123F ACP DISCUSS/DSCN MKR DOCD: CPT | Performed by: INTERNAL MEDICINE

## 2020-07-01 PROCEDURE — G8427 DOCREV CUR MEDS BY ELIG CLIN: HCPCS | Performed by: INTERNAL MEDICINE

## 2020-07-01 PROCEDURE — 1036F TOBACCO NON-USER: CPT | Performed by: INTERNAL MEDICINE

## 2020-07-01 PROCEDURE — 3017F COLORECTAL CA SCREEN DOC REV: CPT | Performed by: INTERNAL MEDICINE

## 2020-07-01 PROCEDURE — 99213 OFFICE O/P EST LOW 20 MIN: CPT | Performed by: INTERNAL MEDICINE

## 2020-07-01 PROCEDURE — G8417 CALC BMI ABV UP PARAM F/U: HCPCS | Performed by: INTERNAL MEDICINE

## 2020-07-01 PROCEDURE — 4040F PNEUMOC VAC/ADMIN/RCVD: CPT | Performed by: INTERNAL MEDICINE

## 2020-07-01 NOTE — PROGRESS NOTES
Sexual Activity    Alcohol use: Yes     Types: 2 Glasses of wine, 2 Cans of beer per week     Comment: rare    Drug use: No    Sexual activity: None   Lifestyle    Physical activity     Days per week: None     Minutes per session: None    Stress: None   Relationships    Social connections     Talks on phone: None     Gets together: None     Attends Worship service: None     Active member of club or organization: None     Attends meetings of clubs or organizations: None     Relationship status: None    Intimate partner violence     Fear of current or ex partner: None     Emotionally abused: None     Physically abused: None     Forced sexual activity: None   Other Topics Concern    None   Social History Narrative    None       Allergies   Allergen Reactions    Vancomycin Itching and Swelling       Current Outpatient Medications   Medication Sig Dispense Refill    metoprolol succinate (TOPROL XL) 50 MG extended release tablet TAKE 1 TABLET BY MOUTH EVERY DAY 90 tablet 1    atorvastatin (LIPITOR) 80 MG tablet Take 1 tablet by mouth daily 90 tablet 0    lisinopril (PRINIVIL;ZESTRIL) 5 MG tablet Take 1 tablet by mouth daily 90 tablet 2    aspirin 81 MG tablet Take 1 tablet by mouth 2 times daily With Food      levothyroxine (SYNTHROID) 112 MCG tablet Take 1 tablet by mouth daily 90 tablet 3     No current facility-administered medications for this visit. Review of Systems:   Review of Systems   Constitutional: Negative for activity change and appetite change. HENT: Negative for congestion. Respiratory: Negative for apnea, choking and chest tightness. Cardiovascular: Negative for chest pain. Gastrointestinal: Negative for abdominal distention and abdominal pain. Endocrine: Negative for cold intolerance and heat intolerance. Genitourinary: Negative for dysuria and enuresis. Musculoskeletal: Negative for arthralgias and back pain. Skin: Negative for color change. Allergic/Immunologic: Negative. Neurological: Negative for dizziness, seizures, syncope and light-headedness. Psychiatric/Behavioral: Negative for agitation, behavioral problems and confusion. Physical Examination:    /76 (Site: Left Upper Arm, Position: Sitting, Cuff Size: Medium Adult)   Pulse 64   Resp 16   Wt 188 lb (85.3 kg)   SpO2 97%   BMI 27.76 kg/m²    Physical Exam   Constitutional: The patient appears healthy. No distress. HENT: Mouth/Throat: Oropharynx is clear. Eyes: Pupils are equal, round, and reactive to light. Neck: Normal range of motion. No JVD present. Cardiovascular: Regular rhythm, S1 normal, S2 normal, normal heart sounds and intact distal pulses. Exam reveals no gallop. No murmur heard. Pulses:       Radial pulses are 2+ on the right side. Dorsalis pedis pulses are 2+ on the right side. Pulmonary/Chest: Effort normal and breath sounds normal. No wheezes. No rales. No tenderness. Abdominal: Soft. Bowel sounds are normal.   Musculoskeletal: Normal range of motion. No edema. Neurological: The patient is alert and oriented to person, place, and time. Intact cranial nerves. Skin: Skin is warm and dry. No rash noted.        LABS:  CBC:   Lab Results   Component Value Date    WBC 8.3 04/08/2020    RBC 4.51 04/08/2020    HGB 14.7 04/08/2020    HCT 43.6 04/08/2020    MCV 96.8 04/08/2020    MCH 32.7 04/08/2020    MCHC 33.8 04/08/2020    RDW 14.6 04/08/2020     04/08/2020    MPV 9.5 02/13/2014     Lipids:  Lab Results   Component Value Date    CHOL 184 05/23/2018    CHOL 166 07/13/2017    CHOL 173 01/12/2016     Lab Results   Component Value Date    TRIG 85 05/23/2018    TRIG 60 07/13/2017    TRIG 78 01/12/2016     Lab Results   Component Value Date    HDL 43 05/23/2018    HDL 44 07/13/2017    HDL 36 01/12/2016     Lab Results   Component Value Date    LDLCALC 118 05/23/2018    LDLCALC 110 07/13/2017    LDLCALC 115 01/12/2016     Lab Results Component Value Date    LABVLDL 76.0 04/24/2013    VLDL 30 09/10/2013    VLDL 21 08/15/2012     Lab Results   Component Value Date    CHOLHDLRATIO 4.6 06/09/2010     CMP:    Lab Results   Component Value Date     04/08/2020    K 3.7 04/08/2020    K 4.2 10/04/2018     04/08/2020    CO2 21 04/08/2020    BUN 16 04/08/2020    CREATININE 0.86 04/08/2020    GFRAA >60.0 04/08/2020    LABGLOM >60.0 04/08/2020    GLUCOSE 116 04/08/2020    PROT 8.2 02/16/2019    LABALBU 3.8 02/16/2019    CALCIUM 8.7 04/08/2020    BILITOT 0.3 02/16/2019    ALKPHOS 81 02/16/2019    AST 25 02/16/2019    ALT 35 02/16/2019     BMP:    Lab Results   Component Value Date     04/08/2020    K 3.7 04/08/2020    K 4.2 10/04/2018     04/08/2020    CO2 21 04/08/2020    BUN 16 04/08/2020    LABALBU 3.8 02/16/2019    CREATININE 0.86 04/08/2020    CALCIUM 8.7 04/08/2020    GFRAA >60.0 04/08/2020    LABGLOM >60.0 04/08/2020    GLUCOSE 116 04/08/2020     Magnesium:    Lab Results   Component Value Date    MG 2.0 04/24/2013     TSH:  Lab Results   Component Value Date    TSH 5.790 (H) 02/19/2019       Patient Active Problem List   Diagnosis    Acquired hypothyroidism    Mixed hyperlipidemia    Lipoma    Eczema    RLS (restless legs syndrome)    Spondylolisthesis at L5-S1 level    Degenerative arthritis of lumbar spine    Basal cell papilloma    Pharyngoesophageal dysphagia    Angina, class IV (HCC)    Coronary artery disease involving native coronary artery of native heart without angina pectoris    S/P CABG (coronary artery bypass graft)    Essential hypertension    Chest pain       Medications:  Current Outpatient Medications   Medication Sig Dispense Refill    metoprolol succinate (TOPROL XL) 50 MG extended release tablet TAKE 1 TABLET BY MOUTH EVERY DAY 90 tablet 1    atorvastatin (LIPITOR) 80 MG tablet Take 1 tablet by mouth daily 90 tablet 0    lisinopril (PRINIVIL;ZESTRIL) 5 MG tablet Take 1 tablet by mouth daily 90 tablet 2    aspirin 81 MG tablet Take 1 tablet by mouth 2 times daily With Food      levothyroxine (SYNTHROID) 112 MCG tablet Take 1 tablet by mouth daily 90 tablet 3     No current facility-administered medications for this visit. Assessment/Plan:    1. Coronary artery disease involving native coronary artery of native heart without angina pectoris  DAPT, statin, b-blocker and RF modification      2. Essential hypertension  Patient has essential hypertension on BP medications. The guideline-directed target for BP in this patient is <130/80. In order to reach our target BP we will continue current BP medications, increasing the dose of current meds or adding new to reach target. 3. Mixed hyperlipidemia  The patient has hyperlipidemia without statin intolerance. The patient will be continued on high intensity statin. The labs are managed by PCP. As per recent guidelines, moderate dose high intensity statin is indicated. Counseling: the patient was counseled regarding diet, exercise, weight control and heart healthy lifestyle. Return in about 6 months (around 1/1/2021). Electronically signed by   Purnima Gloria.  Sierra Katz MD [facility] Director of Cardiology Services and Cardiac Catheterization Laboratory  [facility], [place]    on 7/14/2020 at 11:46 AM

## 2020-10-14 ENCOUNTER — OFFICE VISIT (OUTPATIENT)
Dept: CARDIOLOGY CLINIC | Age: 70
End: 2020-10-14
Payer: MEDICARE

## 2020-10-14 VITALS
HEART RATE: 70 BPM | HEIGHT: 68 IN | SYSTOLIC BLOOD PRESSURE: 119 MMHG | DIASTOLIC BLOOD PRESSURE: 77 MMHG | BODY MASS INDEX: 27.43 KG/M2 | OXYGEN SATURATION: 98 % | RESPIRATION RATE: 18 BRPM | WEIGHT: 181 LBS

## 2020-10-14 PROCEDURE — 99213 OFFICE O/P EST LOW 20 MIN: CPT | Performed by: INTERNAL MEDICINE

## 2020-10-14 PROCEDURE — G8484 FLU IMMUNIZE NO ADMIN: HCPCS | Performed by: INTERNAL MEDICINE

## 2020-10-14 PROCEDURE — G8427 DOCREV CUR MEDS BY ELIG CLIN: HCPCS | Performed by: INTERNAL MEDICINE

## 2020-10-14 PROCEDURE — 4040F PNEUMOC VAC/ADMIN/RCVD: CPT | Performed by: INTERNAL MEDICINE

## 2020-10-14 PROCEDURE — G8417 CALC BMI ABV UP PARAM F/U: HCPCS | Performed by: INTERNAL MEDICINE

## 2020-10-14 PROCEDURE — 1036F TOBACCO NON-USER: CPT | Performed by: INTERNAL MEDICINE

## 2020-10-14 PROCEDURE — 1123F ACP DISCUSS/DSCN MKR DOCD: CPT | Performed by: INTERNAL MEDICINE

## 2020-10-14 PROCEDURE — 3017F COLORECTAL CA SCREEN DOC REV: CPT | Performed by: INTERNAL MEDICINE

## 2020-10-14 NOTE — PROGRESS NOTES
Activity    Alcohol use: Yes     Types: 2 Glasses of wine, 2 Cans of beer per week     Comment: rare    Drug use: No    Sexual activity: None   Lifestyle    Physical activity     Days per week: None     Minutes per session: None    Stress: None   Relationships    Social connections     Talks on phone: None     Gets together: None     Attends Buddhism service: None     Active member of club or organization: None     Attends meetings of clubs or organizations: None     Relationship status: None    Intimate partner violence     Fear of current or ex partner: None     Emotionally abused: None     Physically abused: None     Forced sexual activity: None   Other Topics Concern    None   Social History Narrative    None       Allergies   Allergen Reactions    Vancomycin Itching and Swelling       Current Outpatient Medications   Medication Sig Dispense Refill    atorvastatin (LIPITOR) 80 MG tablet Take 1 tablet by mouth daily 90 tablet 0    lisinopril (PRINIVIL;ZESTRIL) 5 MG tablet Take 1 tablet by mouth daily 90 tablet 2    aspirin 81 MG tablet Take 1 tablet by mouth 2 times daily With Food      levothyroxine (SYNTHROID) 112 MCG tablet Take 1 tablet by mouth daily 90 tablet 3    metoprolol succinate (TOPROL XL) 50 MG extended release tablet TAKE 1 TABLET BY MOUTH EVERY DAY 90 tablet 3     No current facility-administered medications for this visit. Review of Systems:   Review of Systems   Constitutional: Negative for activity change and appetite change. HENT: Negative for congestion. Respiratory: Negative for apnea, choking and chest tightness. Cardiovascular: Negative for chest pain. Gastrointestinal: Negative for abdominal distention and abdominal pain. Endocrine: Negative for cold intolerance and heat intolerance. Genitourinary: Negative for dysuria and enuresis. Musculoskeletal: Negative for arthralgias and back pain. Skin: Negative for color change.    Allergic/Immunologic: Negative. Neurological: Negative for dizziness, seizures, syncope and light-headedness. Psychiatric/Behavioral: Negative for agitation, behavioral problems and confusion. Physical Examination:    /77 (Site: Left Upper Arm, Position: Sitting, Cuff Size: Large Adult)   Pulse 70   Resp 18   Ht 5' 8\" (1.727 m)   Wt 181 lb (82.1 kg)   SpO2 98%   BMI 27.52 kg/m²    Physical Exam   Constitutional: The patient appears healthy. No distress. HENT: Mouth/Throat: Oropharynx is clear. Eyes: Pupils are equal, round, and reactive to light. Neck: Normal range of motion. No JVD present. Cardiovascular: Regular rhythm, S1 normal, S2 normal, normal heart sounds and intact distal pulses. Exam reveals no gallop. No murmur heard. Pulses:       Radial pulses are 2+ on the right side. Dorsalis pedis pulses are 2+ on the right side. Pulmonary/Chest: Effort normal and breath sounds normal. No wheezes. No rales. No tenderness. Abdominal: Soft. Bowel sounds are normal.   Musculoskeletal: Normal range of motion. No edema. Neurological: The patient is alert and oriented to person, place, and time. Intact cranial nerves. Skin: Skin is warm and dry. No rash noted.        LABS:  CBC:   Lab Results   Component Value Date    WBC 8.3 04/08/2020    RBC 4.51 04/08/2020    HGB 14.7 04/08/2020    HCT 43.6 04/08/2020    MCV 96.8 04/08/2020    MCH 32.7 04/08/2020    MCHC 33.8 04/08/2020    RDW 14.6 04/08/2020     04/08/2020    MPV 9.5 02/13/2014     Lipids:  Lab Results   Component Value Date    CHOL 184 05/23/2018    CHOL 166 07/13/2017    CHOL 173 01/12/2016     Lab Results   Component Value Date    TRIG 85 05/23/2018    TRIG 60 07/13/2017    TRIG 78 01/12/2016     Lab Results   Component Value Date    HDL 43 05/23/2018    HDL 44 07/13/2017    HDL 36 01/12/2016     Lab Results   Component Value Date    LDLCALC 118 05/23/2018    LDLCALC 110 07/13/2017    LDLCALC 115 01/12/2016     Lab Results Component Value Date    LABVLDL 76.0 04/24/2013    VLDL 30 09/10/2013    VLDL 21 08/15/2012     Lab Results   Component Value Date    CHOLHDLRATIO 4.6 06/09/2010     CMP:    Lab Results   Component Value Date     04/08/2020    K 3.7 04/08/2020    K 4.2 10/04/2018     04/08/2020    CO2 21 04/08/2020    BUN 16 04/08/2020    CREATININE 0.86 04/08/2020    GFRAA >60.0 04/08/2020    LABGLOM >60.0 04/08/2020    GLUCOSE 116 04/08/2020    PROT 8.2 02/16/2019    LABALBU 3.8 02/16/2019    CALCIUM 8.7 04/08/2020    BILITOT 0.3 02/16/2019    ALKPHOS 81 02/16/2019    AST 25 02/16/2019    ALT 35 02/16/2019     BMP:    Lab Results   Component Value Date     04/08/2020    K 3.7 04/08/2020    K 4.2 10/04/2018     04/08/2020    CO2 21 04/08/2020    BUN 16 04/08/2020    LABALBU 3.8 02/16/2019    CREATININE 0.86 04/08/2020    CALCIUM 8.7 04/08/2020    GFRAA >60.0 04/08/2020    LABGLOM >60.0 04/08/2020    GLUCOSE 116 04/08/2020     Magnesium:    Lab Results   Component Value Date    MG 2.0 04/24/2013     TSH:  Lab Results   Component Value Date    TSH 5.790 (H) 02/19/2019       Patient Active Problem List   Diagnosis    Acquired hypothyroidism    Mixed hyperlipidemia    Lipoma    Eczema    RLS (restless legs syndrome)    Spondylolisthesis at L5-S1 level    Degenerative arthritis of lumbar spine    Basal cell papilloma    Pharyngoesophageal dysphagia    Angina, class IV (HCC)    Coronary artery disease involving native coronary artery of native heart without angina pectoris    S/P CABG (coronary artery bypass graft)    Essential hypertension    Chest pain       Medications:  Current Outpatient Medications   Medication Sig Dispense Refill    atorvastatin (LIPITOR) 80 MG tablet Take 1 tablet by mouth daily 90 tablet 0    lisinopril (PRINIVIL;ZESTRIL) 5 MG tablet Take 1 tablet by mouth daily 90 tablet 2    aspirin 81 MG tablet Take 1 tablet by mouth 2 times daily With Food      levothyroxine (SYNTHROID) 112 MCG tablet Take 1 tablet by mouth daily 90 tablet 3    metoprolol succinate (TOPROL XL) 50 MG extended release tablet TAKE 1 TABLET BY MOUTH EVERY DAY 90 tablet 3     No current facility-administered medications for this visit. Assessment/Plan:    1. Mixed hyperlipidemia  The patient has hyperlipidemia without statin intolerance. The patient will be continued on high intensity statin. The labs are managed by PCP. As per recent guidelines, moderate dose high intensity statin is indicated. 2. Coronary artery disease involving native coronary artery of native heart without angina pectoris  DAPT, statin, b-blocker and RF modification      3. Essential hypertension  Patient has essential hypertension on BP medications. The guideline-directed target for BP in this patient is <130/80. In order to reach our target BP we will continue current BP medications, increasing the dose of current meds or adding new to reach target. Counseling: the patient was counseled regarding diet, exercise, weight control and heart healthy lifestyle. Return in about 6 months (around 4/14/2021) for followup cv disease. Electronically signed by   Tania Stanford.  Harlan Tom MD [facility] Director of Cardiology Services and Cardiac Catheterization Laboratory  [facility], [place]    on 10/27/2020 at 3:01 PM

## 2020-10-27 RX ORDER — METOPROLOL SUCCINATE 50 MG/1
TABLET, EXTENDED RELEASE ORAL
Qty: 90 TABLET | Refills: 3 | Status: SHIPPED | OUTPATIENT
Start: 2020-10-27 | End: 2020-10-30 | Stop reason: SDUPTHER

## 2020-11-02 RX ORDER — ATORVASTATIN CALCIUM 80 MG/1
80 TABLET, FILM COATED ORAL DAILY
Qty: 90 TABLET | Refills: 3 | Status: SHIPPED | OUTPATIENT
Start: 2020-11-02 | End: 2020-11-05 | Stop reason: SDUPTHER

## 2020-11-02 RX ORDER — LISINOPRIL 5 MG/1
5 TABLET ORAL DAILY
Qty: 90 TABLET | Refills: 3 | Status: SHIPPED | OUTPATIENT
Start: 2020-11-02 | End: 2020-11-05 | Stop reason: SDUPTHER

## 2020-11-02 RX ORDER — METOPROLOL SUCCINATE 50 MG/1
TABLET, EXTENDED RELEASE ORAL
Qty: 90 TABLET | Refills: 3 | Status: SHIPPED | OUTPATIENT
Start: 2020-11-02

## 2020-11-06 RX ORDER — LISINOPRIL 5 MG/1
5 TABLET ORAL DAILY
Qty: 90 TABLET | Refills: 3 | Status: SHIPPED | OUTPATIENT
Start: 2020-11-06 | End: 2021-02-04

## 2020-11-06 RX ORDER — ATORVASTATIN CALCIUM 80 MG/1
80 TABLET, FILM COATED ORAL DAILY
Qty: 90 TABLET | Refills: 3 | Status: SHIPPED | OUTPATIENT
Start: 2020-11-06

## 2021-11-13 DIAGNOSIS — I10 ESSENTIAL HYPERTENSION: ICD-10-CM

## 2021-11-15 RX ORDER — LISINOPRIL 5 MG/1
TABLET ORAL
Qty: 90 TABLET | Refills: 3 | OUTPATIENT
Start: 2021-11-15

## 2021-11-16 DIAGNOSIS — I10 ESSENTIAL HYPERTENSION: ICD-10-CM

## 2021-11-16 RX ORDER — LISINOPRIL 5 MG/1
TABLET ORAL
Qty: 90 TABLET | Refills: 3 | OUTPATIENT
Start: 2021-11-16

## 2023-03-07 ENCOUNTER — DOCUMENTATION (OUTPATIENT)
Dept: PRIMARY CARE | Facility: CLINIC | Age: 73
End: 2023-03-07
Payer: MEDICARE

## 2023-03-07 PROBLEM — R97.20 ELEVATED PSA: Status: ACTIVE | Noted: 2023-03-07

## 2023-03-07 PROBLEM — H91.8X3 ASYMMETRICAL HEARING LOSS: Status: ACTIVE | Noted: 2023-03-07

## 2023-03-07 PROBLEM — H93.13 TINNITUS, BILATERAL: Status: ACTIVE | Noted: 2023-03-07

## 2023-03-07 PROBLEM — L03.311 CELLULITIS OF RIGHT ABDOMINAL WALL: Status: ACTIVE | Noted: 2023-03-07

## 2023-03-07 PROBLEM — N40.0 BPH (BENIGN PROSTATIC HYPERPLASIA): Status: ACTIVE | Noted: 2023-03-07

## 2023-03-07 PROBLEM — I51.9 HEART DISEASE: Status: ACTIVE | Noted: 2023-03-07

## 2023-03-07 PROBLEM — R68.89 EYE, EAR, NOSE, AND THROAT SYMPTOM: Status: ACTIVE | Noted: 2023-03-07

## 2023-03-07 PROBLEM — M47.12 CERVICAL SPONDYLOSIS WITH MYELOPATHY: Status: ACTIVE | Noted: 2023-03-07

## 2023-03-07 PROBLEM — R51.9 HEADACHE: Status: ACTIVE | Noted: 2023-03-07

## 2023-03-07 PROBLEM — G99.2 MYELOPATHY CONCURRENT WITH AND DUE TO SPINAL STENOSIS OF CERVICAL REGION (MULTI): Status: ACTIVE | Noted: 2023-03-07

## 2023-03-07 PROBLEM — R20.2 PARESTHESIA OF UPPER AND LOWER EXTREMITIES OF BOTH SIDES: Status: ACTIVE | Noted: 2023-03-07

## 2023-03-07 PROBLEM — K40.20 NON-RECURRENT BILATERAL INGUINAL HERNIA WITHOUT OBSTRUCTION OR GANGRENE: Status: ACTIVE | Noted: 2023-03-07

## 2023-03-07 PROBLEM — M54.6 ACUTE BILATERAL THORACIC BACK PAIN: Status: ACTIVE | Noted: 2023-03-07

## 2023-03-07 PROBLEM — M54.12 NEUROPATHY, CERVICAL (RADICULAR): Status: ACTIVE | Noted: 2023-03-07

## 2023-03-07 PROBLEM — R60.0 BILATERAL EDEMA OF LOWER EXTREMITY: Status: ACTIVE | Noted: 2023-03-07

## 2023-03-07 PROBLEM — E78.2 MIXED HYPERLIPIDEMIA: Status: ACTIVE | Noted: 2023-03-07

## 2023-03-07 PROBLEM — I10 ESSENTIAL HYPERTENSION: Status: ACTIVE | Noted: 2023-03-07

## 2023-03-07 PROBLEM — I83.893 VARICOSE VEINS OF BOTH LEGS WITH EDEMA: Status: ACTIVE | Noted: 2023-03-07

## 2023-03-07 PROBLEM — R06.83 SNORING: Status: ACTIVE | Noted: 2023-03-07

## 2023-03-07 PROBLEM — L30.9 DERMATITIS: Status: ACTIVE | Noted: 2023-03-07

## 2023-03-07 PROBLEM — R13.10 DYSPHAGIA: Status: ACTIVE | Noted: 2023-03-07

## 2023-03-07 PROBLEM — N52.9 MALE ERECTILE DYSFUNCTION, UNSPECIFIED: Status: ACTIVE | Noted: 2023-03-07

## 2023-03-07 PROBLEM — R07.9 CHEST PAIN: Status: ACTIVE | Noted: 2023-03-07

## 2023-03-07 PROBLEM — R07.89 OTHER CHEST PAIN: Status: ACTIVE | Noted: 2023-03-07

## 2023-03-07 PROBLEM — L25.9 CONTACT DERMATITIS: Status: ACTIVE | Noted: 2023-03-07

## 2023-03-07 PROBLEM — M47.812 SPONDYLOSIS OF CERVICAL SPINE WITHOUT MYELOPATHY: Status: ACTIVE | Noted: 2023-03-07

## 2023-03-07 PROBLEM — M12.812 ROTATOR CUFF ARTHROPATHY OF LEFT SHOULDER: Status: ACTIVE | Noted: 2023-03-07

## 2023-03-07 PROBLEM — I25.10 CORONARY ARTERY DISEASE INVOLVING NATIVE CORONARY ARTERY OF NATIVE HEART WITHOUT ANGINA PECTORIS: Status: ACTIVE | Noted: 2023-03-07

## 2023-03-07 PROBLEM — H90.5 HIGH FREQUENCY SENSORINEURAL HEARING LOSS OF LEFT EAR: Status: ACTIVE | Noted: 2023-03-07

## 2023-03-07 PROBLEM — T88.7XXA MEDICATION SIDE EFFECTS: Status: ACTIVE | Noted: 2023-03-07

## 2023-03-07 PROBLEM — E03.9 ACQUIRED HYPOTHYROIDISM: Status: ACTIVE | Noted: 2023-03-07

## 2023-03-07 PROBLEM — R06.02 SHORTNESS OF BREATH: Status: ACTIVE | Noted: 2023-03-07

## 2023-03-07 PROBLEM — M19.90 GENERALIZED ARTHRITIS: Status: ACTIVE | Noted: 2023-03-07

## 2023-03-07 PROBLEM — H57.9 EYE PROBLEM: Status: ACTIVE | Noted: 2023-03-07

## 2023-03-07 PROBLEM — M48.02 MYELOPATHY CONCURRENT WITH AND DUE TO SPINAL STENOSIS OF CERVICAL REGION (MULTI): Status: ACTIVE | Noted: 2023-03-07

## 2023-03-07 PROBLEM — C61 PROSTATE CANCER (MULTI): Status: ACTIVE | Noted: 2023-03-07

## 2023-03-07 RX ORDER — ATORVASTATIN CALCIUM 80 MG/1
80 TABLET, FILM COATED ORAL DAILY
COMMUNITY

## 2023-03-07 RX ORDER — TADALAFIL 20 MG/1
TABLET ORAL SEE ADMIN INSTRUCTIONS
COMMUNITY
Start: 2021-09-03 | End: 2024-02-13 | Stop reason: HOSPADM

## 2023-03-07 RX ORDER — ASPIRIN 81 MG/1
162 TABLET ORAL ONCE
COMMUNITY

## 2023-03-07 RX ORDER — NITROGLYCERIN 0.4 MG/1
0.4 TABLET SUBLINGUAL AS NEEDED
COMMUNITY

## 2023-03-07 RX ORDER — METOPROLOL SUCCINATE 50 MG/1
1 TABLET, EXTENDED RELEASE ORAL DAILY
COMMUNITY
End: 2023-03-13 | Stop reason: ALTCHOICE

## 2023-03-07 RX ORDER — TRIAMCINOLONE ACETONIDE 0.25 MG/ML
LOTION TOPICAL SEE ADMIN INSTRUCTIONS
COMMUNITY
Start: 2020-05-27 | End: 2024-02-15 | Stop reason: WASHOUT

## 2023-03-07 RX ORDER — LISINOPRIL 5 MG/1
10 TABLET ORAL DAILY
COMMUNITY
End: 2023-04-25 | Stop reason: DRUGHIGH

## 2023-03-07 RX ORDER — LEVOTHYROXINE SODIUM 112 UG/1
1 CAPSULE ORAL
COMMUNITY
End: 2023-03-14

## 2023-03-07 NOTE — PROGRESS NOTES
Discharge Facility: [facility]  Discharge Diagnosis: Chest pain  Admission Date: 3/1/23  Discharge Date: 3/3/23      Engagement  Call Start Time: 1342 (3/7/2023  1:56 PM)    Medications  Medications reviewed with patient/caregiver?: Yes (3/7/2023  1:56 PM)  Is the patient having any side effects they believe may be caused by any medication additions or changes?: No (3/7/2023  1:56 PM)  Does the patient have all medications ordered at discharge?: Yes (3/7/2023  1:56 PM)  Nursing Interventions: No intervention needed (3/7/2023  1:56 PM)  Is the patient taking all medications as directed (includes completed medication regime)?: Yes (3/7/2023  1:56 PM)    Appointments  Does the patient have a primary care provider?: Yes (3/7/2023  1:56 PM)  Nursing Interventions: Verified appointment date/time/provider (3/7/2023  1:56 PM)  Has the patient kept scheduled appointments due by today?: Yes (3/7/2023  1:56 PM)  Nursing Interventions: Advised patient to keep appointment (3/7/2023  1:56 PM)    Patient Teaching  Does the patient have access to their discharge instructions?: Yes (3/7/2023  1:56 PM)  Nursing Interventions: Reviewed instructions with patient (3/7/2023  1:56 PM)  What is the patient's perception of their health status since discharge?: Improving (3/7/2023  1:56 PM)  Is the patient/caregiver able to teach back the hierarchy of who to call/visit for symptoms/problems? PCP, Specialist, Home Health nurse, Urgent Care, ED, 911: Yes (3/7/2023  1:56 PM)    Wrap Up  Call End Time: 1349 (3/7/2023  1:56 PM)

## 2023-03-10 ENCOUNTER — HOSPITAL ENCOUNTER (EMERGENCY)
Age: 73
Discharge: HOME | End: 2023-03-11
Attending: EMERGENCY MEDICINE
Payer: MEDICARE

## 2023-03-10 ENCOUNTER — OFFICE VISIT (OUTPATIENT)
Dept: PRIMARY CARE | Facility: CLINIC | Age: 73
End: 2023-03-10
Payer: MEDICARE

## 2023-03-10 ENCOUNTER — APPOINTMENT (OUTPATIENT)
Dept: GENERAL RADIOLOGY | Age: 73
End: 2023-03-10
Payer: MEDICARE

## 2023-03-10 VITALS
HEART RATE: 86 BPM | OXYGEN SATURATION: 98 % | BODY MASS INDEX: 27.85 KG/M2 | SYSTOLIC BLOOD PRESSURE: 116 MMHG | WEIGHT: 188 LBS | RESPIRATION RATE: 16 BRPM | DIASTOLIC BLOOD PRESSURE: 74 MMHG | TEMPERATURE: 97.5 F | HEIGHT: 69 IN

## 2023-03-10 DIAGNOSIS — I25.10 CORONARY ARTERY DISEASE INVOLVING NATIVE CORONARY ARTERY OF NATIVE HEART WITHOUT ANGINA PECTORIS: ICD-10-CM

## 2023-03-10 DIAGNOSIS — I44.1 SECOND DEGREE AV BLOCK: ICD-10-CM

## 2023-03-10 DIAGNOSIS — Z09 HOSPITAL DISCHARGE FOLLOW-UP: Primary | ICD-10-CM

## 2023-03-10 DIAGNOSIS — G99.2 MYELOPATHY CONCURRENT WITH AND DUE TO SPINAL STENOSIS OF CERVICAL REGION (MULTI): ICD-10-CM

## 2023-03-10 DIAGNOSIS — M48.02 MYELOPATHY CONCURRENT WITH AND DUE TO SPINAL STENOSIS OF CERVICAL REGION (MULTI): ICD-10-CM

## 2023-03-10 DIAGNOSIS — C61 PROSTATE CANCER (MULTI): ICD-10-CM

## 2023-03-10 DIAGNOSIS — R07.9 CHEST PAIN, UNSPECIFIED TYPE: ICD-10-CM

## 2023-03-10 DIAGNOSIS — R07.9 CHEST PAIN, UNSPECIFIED TYPE: Primary | ICD-10-CM

## 2023-03-10 LAB
EKG ATRIAL RATE: 80 BPM
EKG P AXIS: 9 DEGREES
EKG P-R INTERVAL: 304 MS
EKG Q-T INTERVAL: 404 MS
EKG QRS DURATION: 110 MS
EKG QTC CALCULATION (BAZETT): 426 MS
EKG R AXIS: -23 DEGREES
EKG T AXIS: -2 DEGREES
EKG VENTRICULAR RATE: 67 BPM
TROPONIN: <0.01 NG/ML (ref 0–0.01)

## 2023-03-10 PROCEDURE — 3074F SYST BP LT 130 MM HG: CPT | Performed by: FAMILY MEDICINE

## 2023-03-10 PROCEDURE — 1036F TOBACCO NON-USER: CPT | Performed by: FAMILY MEDICINE

## 2023-03-10 PROCEDURE — 85025 COMPLETE CBC W/AUTO DIFF WBC: CPT

## 2023-03-10 PROCEDURE — 71045 X-RAY EXAM CHEST 1 VIEW: CPT

## 2023-03-10 PROCEDURE — 83735 ASSAY OF MAGNESIUM: CPT

## 2023-03-10 PROCEDURE — 99495 TRANSJ CARE MGMT MOD F2F 14D: CPT | Performed by: FAMILY MEDICINE

## 2023-03-10 PROCEDURE — 99285 EMERGENCY DEPT VISIT HI MDM: CPT

## 2023-03-10 PROCEDURE — 80053 COMPREHEN METABOLIC PANEL: CPT

## 2023-03-10 PROCEDURE — 1159F MED LIST DOCD IN RCRD: CPT | Performed by: FAMILY MEDICINE

## 2023-03-10 PROCEDURE — 1170F FXNL STATUS ASSESSED: CPT | Performed by: FAMILY MEDICINE

## 2023-03-10 PROCEDURE — 84484 ASSAY OF TROPONIN QUANT: CPT

## 2023-03-10 PROCEDURE — 3078F DIAST BP <80 MM HG: CPT | Performed by: FAMILY MEDICINE

## 2023-03-10 PROCEDURE — 93005 ELECTROCARDIOGRAM TRACING: CPT | Performed by: EMERGENCY MEDICINE

## 2023-03-10 PROCEDURE — 36415 COLL VENOUS BLD VENIPUNCTURE: CPT

## 2023-03-10 RX ORDER — RANOLAZINE 500 MG/1
500 TABLET, EXTENDED RELEASE ORAL 2 TIMES DAILY
COMMUNITY
End: 2023-04-25 | Stop reason: ALTCHOICE

## 2023-03-10 ASSESSMENT — ENCOUNTER SYMPTOMS
PHOTOPHOBIA: 0
OCCASIONAL FEELINGS OF UNSTEADINESS: 0
DEPRESSION: 0
SORE THROAT: 0
COUGH: 0
VOMITING: 0
LOSS OF SENSATION IN FEET: 0
ABDOMINAL DISTENTION: 0
WHEEZING: 0
ABDOMINAL PAIN: 0
SHORTNESS OF BREATH: 0
CHEST TIGHTNESS: 0
EYE DISCHARGE: 0

## 2023-03-10 ASSESSMENT — PATIENT HEALTH QUESTIONNAIRE - PHQ9
1. LITTLE INTEREST OR PLEASURE IN DOING THINGS: NOT AT ALL
SUM OF ALL RESPONSES TO PHQ9 QUESTIONS 1 AND 2: 0
SUM OF ALL RESPONSES TO PHQ9 QUESTIONS 1 AND 2: 0
1. LITTLE INTEREST OR PLEASURE IN DOING THINGS: NOT AT ALL
2. FEELING DOWN, DEPRESSED OR HOPELESS: NOT AT ALL
2. FEELING DOWN, DEPRESSED OR HOPELESS: NOT AT ALL

## 2023-03-10 ASSESSMENT — PAIN DESCRIPTION - ORIENTATION: ORIENTATION: MID

## 2023-03-10 ASSESSMENT — PAIN - FUNCTIONAL ASSESSMENT
PAIN_FUNCTIONAL_ASSESSMENT: ACTIVITIES ARE NOT PREVENTED
PAIN_FUNCTIONAL_ASSESSMENT: 0-10

## 2023-03-10 ASSESSMENT — PAIN SCALES - GENERAL: PAINLEVEL_OUTOF10: 2

## 2023-03-10 ASSESSMENT — ACTIVITIES OF DAILY LIVING (ADL)
DRESSING: INDEPENDENT
BATHING: INDEPENDENT

## 2023-03-10 ASSESSMENT — PAIN DESCRIPTION - LOCATION: LOCATION: CHEST

## 2023-03-10 ASSESSMENT — PAIN DESCRIPTION - DESCRIPTORS: DESCRIPTORS: PRESSURE

## 2023-03-10 NOTE — PROGRESS NOTES
"Discharge Facility: [facility]  Discharge Diagnosis: Chest pain  Admission Date: 3/1/23  Discharge Date: 3/3/23     Mao Ordoñez is a 72 y.o. male presenting today for follow-up after being discharged from the hospital 7 days ago. The main problem requiring admission was chest pain. The discharge summary and/or Transitional Care Management documentation was reviewed. Medication reconciliation was performed as indicated via the \"Marquis as Reviewed\" timestamp.     Mao Ordoñez was contacted by Transitional Care Management services two days after his discharge. This encounter and supporting documentation was reviewed.    The complexity of medical decision making for this patient's transitional care is moderate.    Review of system  General no fever, chills, appetite unchanged, and no change in weight  ENT no runny nose, nasal congestion, sinus pressure, no sore throat, pain with swallowing, earache, loss of taste, tinnitus, ear discharge  Eyes no eye redness, irritation, eye pain, double vision, blurred vision.  Skin no rash, itching, change in skin color and no lumps  Respiratory no cough, no hemoptysis, wheezing or pleuritic pain.  Gastrointestinal no nausea or vomiting, no abdominal pain, abdominal distention, hematemesis, constipation or diarrhea.  No hematochezia and no melena stool.  Neurologic no confusion, headache, blurred vision, loss of balance, paresthesia, muscle weakness, dizziness, vertigo or slow speech.  Musculoskeletal no back pain, neck pain, hip or pain in other joints.  Psychiatric no Depression, anxiety, sleep difficulty, dysphoria and no changes in the mood.      Family History   Problem Relation Name Age of Onset    Hypertension Mother      Hyperlipidemia Father      Hypothyroidism Father      Hyperlipidemia Other sibling     Hypothyroidism Other sibling      Engagement  Call Start Time: 1342 (3/7/2023  1:56 PM)    Medications  Medications reviewed with patient/caregiver?: Yes (3/7/2023  1:56 " PM)  Is the patient having any side effects they believe may be caused by any medication additions or changes?: No (3/7/2023  1:56 PM)  Does the patient have all medications ordered at discharge?: Yes (3/7/2023  1:56 PM)  Care Management Interventions: No intervention needed (3/7/2023  1:56 PM)  Is the patient taking all medications as directed (includes completed medication regime)?: Yes (3/7/2023  1:56 PM)    Appointments  Does the patient have a primary care provider?: Yes (3/7/2023  1:56 PM)  Care Management Interventions: Verified appointment date/time/provider (3/7/2023  1:56 PM)  Has the patient kept scheduled appointments due by today?: Yes (3/7/2023  1:56 PM)  Care Management Interventions: Advised patient to keep appointment (3/7/2023  1:56 PM)    Patient Teaching  Does the patient have access to their discharge instructions?: Yes (3/7/2023  1:56 PM)  Care Management Interventions: Reviewed instructions with patient (3/7/2023  1:56 PM)  What is the patient's perception of their health status since discharge?: Improving (3/7/2023  1:56 PM)  Is the patient/caregiver able to teach back the hierarchy of who to call/visit for symptoms/problems? PCP, Specialist, Home Health nurse, Urgent Care, ED, 911: Yes (3/7/2023  1:56 PM)    Wrap Up  Call End Time: 1349 (3/7/2023  1:56 PM)    He presents for follow up on chest pain. Onset was 9 days ago. Symptoms have improved since that time. The patient's pain is intermittent. The patient describes the pain as pressure and radiates to the back. Associated symptoms are: dyspnea. Aggravating factors are: exertionPatient's risk factors for DVT/PE: none. Previous cardiac testing: chest x-ray and nuclear stress test.    Exam   General well-appearing well-nourished, not in apparent distress.  HEENT EOMI, PEERLA, oropharynx no erythema or exudate.  No mouth lesions.  External auditory canal normal bilaterally.  Tympanic membranes normal bilaterally.  Skin warm, no rash and no  lesions  Neck supple, trachea central, no thyromegaly.  No cervical adenopathy.  Lungs good air entry bilaterally without rhonchi or rales and no wheezes.  Cardiovascular regular rate and rhythm, no murmurs gallop or rub.  Point of maximal impulse at fifth left intercostal space midclavicular line  Abdomen nondistended, soft, no tenderness, no palpable abdominal mass, liver and spleen not enlarged,   no CVA tenderness, bowel sounds normal.  Extremities no cyanosis clubbing or edema  Musculoskeletal no joint deformity, warmth and normal gait.   Neuro alert and oriented, cranial nerves II through XII normal, reflexes 2+ bilaterally.  Normal muscle strength.  No ankle clonus and Babinski sign negative.    Problem List Items Addressed This Visit          Circulatory    Coronary artery disease involving native coronary artery of native heart without angina pectoris    Relevant Medications    ranolazine (Ranexa) 500 mg 12 hr tablet    Second degree AV block       Other    Chest pain     Other Visit Diagnoses       Hospital discharge follow-up    -  Primary           Scribe Attestation  By signing my name below, IAlbert Scribharsha   attest that this documentation has been prepared under the direction and in the presence of Shadi Hazel MD.

## 2023-03-11 VITALS
OXYGEN SATURATION: 97 % | HEART RATE: 78 BPM | DIASTOLIC BLOOD PRESSURE: 86 MMHG | RESPIRATION RATE: 18 BRPM | TEMPERATURE: 98.2 F | HEIGHT: 69 IN | WEIGHT: 188 LBS | SYSTOLIC BLOOD PRESSURE: 127 MMHG | BODY MASS INDEX: 27.85 KG/M2

## 2023-03-11 LAB
ALBUMIN SERPL-MCNC: 3.6 G/DL (ref 3.5–4.6)
ALP BLD-CCNC: 77 U/L (ref 35–104)
ALT SERPL-CCNC: 34 U/L (ref 0–41)
ANION GAP SERPL CALCULATED.3IONS-SCNC: 10 MEQ/L (ref 9–15)
AST SERPL-CCNC: 23 U/L (ref 0–40)
BASOPHILS ABSOLUTE: 0.3 K/UL (ref 0–0.2)
BASOPHILS RELATIVE PERCENT: 4 %
BILIRUB SERPL-MCNC: 0.4 MG/DL (ref 0.2–0.7)
BUN BLDV-MCNC: 17 MG/DL (ref 8–23)
CALCIUM SERPL-MCNC: 9.1 MG/DL (ref 8.5–9.9)
CHLORIDE BLD-SCNC: 99 MEQ/L (ref 95–107)
CO2: 23 MEQ/L (ref 20–31)
CREAT SERPL-MCNC: 0.93 MG/DL (ref 0.7–1.2)
EOSINOPHILS ABSOLUTE: 0.4 K/UL (ref 0–0.7)
EOSINOPHILS RELATIVE PERCENT: 6 %
GFR SERPL CREATININE-BSD FRML MDRD: >60 ML/MIN/{1.73_M2}
GLOBULIN: 3.9 G/DL (ref 2.3–3.5)
GLUCOSE BLD-MCNC: 104 MG/DL (ref 70–99)
HCT VFR BLD CALC: 43 % (ref 42–52)
HEMOGLOBIN: 14.5 G/DL (ref 14–18)
LYMPHOCYTES ABSOLUTE: 1.7 K/UL (ref 1–4.8)
LYMPHOCYTES RELATIVE PERCENT: 23 %
MAGNESIUM: 1.9 MG/DL (ref 1.7–2.4)
MCH RBC QN AUTO: 31.2 PG (ref 27–31.3)
MCHC RBC AUTO-ENTMCNC: 33.8 % (ref 33–37)
MCV RBC AUTO: 92.2 FL (ref 79–92.2)
MONOCYTES ABSOLUTE: 1.6 K/UL (ref 0.2–0.8)
MONOCYTES RELATIVE PERCENT: 21.6 %
NEUTROPHILS ABSOLUTE: 3.3 K/UL (ref 1.4–6.5)
NEUTROPHILS RELATIVE PERCENT: 45 %
PDW BLD-RTO: 14.2 % (ref 11.5–14.5)
PLATELET # BLD: 206 K/UL (ref 130–400)
PLATELET SLIDE REVIEW: NORMAL
POTASSIUM SERPL-SCNC: 4.2 MEQ/L (ref 3.4–4.9)
RBC # BLD: 4.66 M/UL (ref 4.7–6.1)
RBC # BLD: NORMAL 10*6/UL
SODIUM BLD-SCNC: 132 MEQ/L (ref 135–144)
TOTAL PROTEIN: 7.5 G/DL (ref 6.3–8)
WBC # BLD: 7.4 K/UL (ref 4.8–10.8)

## 2023-03-11 PROCEDURE — 93005 ELECTROCARDIOGRAM TRACING: CPT | Performed by: EMERGENCY MEDICINE

## 2023-03-11 NOTE — ED PROVIDER NOTES
[address] [facility] ED  eMERGENCY dEPARTMENT eNCOUnter      Pt Name: Jamir Helton  MRN: [id number]  [place]ren 1950  Date of evaluation: 3/10/2023  Provider: Cindy Anglin MD    CHIEF COMPLAINT     No chief complaint on file. HISTORY OF PRESENT ILLNESS   (Location/Symptom, Timing/Onset,Context/Setting, Quality, Duration, Modifying Factors, Severity)  Note limiting factors. Jamir Helton is a 67 y.o. male who presents to the emergency department for evaluation of chest pressure. Patient states he was just watching TV tonight when he had 15 minutes of chest pressure nonradiating with a tremor feeling lasting approximately 15 minutes also. No associated nausea, diaphoresis or shortness of breath. He has a cardiac history with CABG 4-1/2 years ago. Patient had some similar discomfort last week with associated back pain so he was seen and admitted at the [facility] for 2 days. This led to a chemical stress test that was normal and he was taken off metoprolol due to some frequent PVCs. Blood pressures been creeping up this week so his cardiologist increased his lisinopril from 5 mg to 10 mg. He has had no lightheadedness or dizziness. Currently is pain-free. Did not take nitro at home. HPI    NursingNotes were reviewed. REVIEW OF SYSTEMS    (2-9 systems for level 4, 10 or more for level 5)     Review of Systems   Constitutional:  Negative for chills and diaphoresis. HENT:  Negative for congestion, ear pain, mouth sores and sore throat. Eyes:  Negative for photophobia and discharge. Respiratory:  Negative for cough, chest tightness, shortness of breath and wheezing. Cardiovascular:  Positive for chest pain. Negative for palpitations. Gastrointestinal:  Negative for abdominal distention, abdominal pain and vomiting. Endocrine: Negative for cold intolerance. Genitourinary:  Negative for difficulty urinating. Musculoskeletal:  Negative for arthralgias. Skin:  Negative for pallor and rash. Allergic/Immunologic: Negative for immunocompromised state. Neurological:  Negative for dizziness and syncope. Hematological:  Negative for adenopathy. Psychiatric/Behavioral:  Negative for agitation and hallucinations. All other systems reviewed and are negative. Except as noted above the remainder of the review of systems was reviewed and negative. PAST MEDICAL HISTORY     Past Medical History:   Diagnosis Date    CAD (coronary artery disease)     Eczema     Hyperlipidemia     Hypertension     Hypothyroidism          SURGICALHISTORY       Past Surgical History:   Procedure Laterality Date    COLONOSCOPY  10/01/14    DR. Gerson Stern    CORONARY ARTERY BYPASS GRAFT  10/11/2018    DR. DUMONT    CORONARY ARTERY BYPASS GRAFT      HERNIA REPAIR  01/2020    [facility]    KNEE SURGERY      left    VASECTOMY           CURRENT MEDICATIONS       Previous Medications    ASPIRIN 81 MG TABLET    Take 1 tablet by mouth 2 times daily With Food    ATORVASTATIN (LIPITOR) 80 MG TABLET    Take 1 tablet by mouth daily    LEVOTHYROXINE (SYNTHROID) 112 MCG TABLET    Take 1 tablet by mouth daily    LISINOPRIL (PRINIVIL;ZESTRIL) 5 MG TABLET    Take 1 tablet by mouth daily    METOPROLOL SUCCINATE (TOPROL XL) 50 MG EXTENDED RELEASE TABLET    TAKE 1 TABLET BY MOUTH EVERY DAY       ALLERGIES     Vancomycin    FAMILY HISTORY       Family History   Problem Relation Age of Onset    High Blood Pressure Mother     High Cholesterol Father     Other Father         thyroid    Other Sister         thyroid    Other Brother         throid          SOCIAL HISTORY       Social History     Socioeconomic History    Marital status:      Spouse name: None    Number of children: None    Years of education: None    Highest education level: None   Occupational History    Occupation: Electical      Comment: [employer]   Tobacco Use    Smoking status: Never    Smokeless tobacco: Never   Substance and Sexual Activity    Alcohol use: Yes     Types: 2 Glasses of wine, 2 Cans of beer per week     Comment: rare    Drug use: No       SCREENINGS      @FLOW([id number])@      PHYSICAL EXAM    (up to 7 for level 4, 8 or more for level 5)     ED Triage Vitals [03/10/23 2250]   BP Temp Temp Source Heart Rate Resp SpO2 Height Weight   (!) 144/99 98.2 °F (36.8 °C) Temporal 87 18 94 % 5' 9\" (1.753 m) 188 lb (85.3 kg)       Physical Exam  Vitals and nursing note reviewed. Constitutional:       Appearance: He is well-developed. HENT:      Head: Normocephalic. Nose: Nose normal.      Mouth/Throat:      Mouth: Mucous membranes are moist.   Eyes:      Conjunctiva/sclera: Conjunctivae normal.      Pupils: Pupils are equal, round, and reactive to light. Cardiovascular:      Rate and Rhythm: Normal rate and regular rhythm. Heart sounds: Normal heart sounds. Pulmonary:      Effort: Pulmonary effort is normal.      Breath sounds: Normal breath sounds. Abdominal:      General: Bowel sounds are normal.      Palpations: Abdomen is soft. Tenderness: There is no abdominal tenderness. There is no guarding. Musculoskeletal:         General: Normal range of motion. Cervical back: Normal range of motion and neck supple. Skin:     General: Skin is warm and dry. Capillary Refill: Capillary refill takes less than 2 seconds. Neurological:      General: No focal deficit present. Mental Status: He is alert and oriented to person, place, and time. Psychiatric:         Mood and Affect: Mood normal.       DIAGNOSTIC RESULTS     EKG: All EKG's are interpreted by the Emergency Department Physician who either signs or Co-signsthis chart in the absence of a cardiologist.    EKG shows sinus rhythm with marked sinus arrhythmia. Nonspecific ST-T wave changes but no signs of ischemia. Leftward axis. Abnormal EKG. Rate of 67  EKG #2 shows a sinus rhythm rate of 76. There is first-degree AV block.   There is nonspecific ST-T wave changes but no ischemia. Leftward axis. Abnormal EKG. Unchanged from initial EKG. RADIOLOGY:   Non-plain filmimages such as CT, Ultrasound and MRI are read by the radiologist. Plain radiographic images are visualized and preliminarily interpreted by the emergency physician with the below findings:    Chest x-ray is interpreted by me as negative. Interpretation per the Radiologist below, if available at the time ofthis note:    XR CHEST PORTABLE   Final Result   No acute disease. RECOMMENDATION:   Careful clinical correlation and follow up recommended. ED BEDSIDE ULTRASOUND:   Performed by ED Physician - none    LABS:  Labs Reviewed   COMPREHENSIVE METABOLIC PANEL - Abnormal; Notable for the following components:       Result Value    Sodium 132 (*)     Glucose 104 (*)     Globulin 3.9 (*)     All other components within normal limits   CBC WITH AUTO DIFFERENTIAL - Abnormal; Notable for the following components:    RBC 4.66 (*)     All other components within normal limits   TROPONIN   MAGNESIUM       All other labs were within normal range or not returned as of this dictation. EMERGENCY DEPARTMENT COURSE and DIFFERENTIAL DIAGNOSIS/MDM:   Vitals:    Vitals:    03/10/23 2329 03/11/23 0029 03/11/23 0034 03/11/23 0046   BP: (!) 125/90 127/86     Pulse: 81 77 79 78   Resp: 18 (!) 0 10 18   Temp:       TempSrc:       SpO2: 96% 95% 95% 97%   Weight:       Height:                MDM    Patient has been pain-free the whole time here in the emergency department. He has had 2 unchanged EKGs. He has normal monitoring. Initial troponin negative. I discussed with both him and his wife the option of repeating his troponin to be more sensitive in evaluating his brief chest pressure. They elected not to have repeat troponin done which I think is reasonable at this point. He had a negative cardiac stress test 1 week ago and now a negative troponin.   He follows closely with cardiology. He is told if he has returning symptoms he should be reevaluated. Otherwise follow-up cardiologist plan    CONSULTS:  None    PROCEDURES:  Unless otherwise noted below, none     Procedures    FINAL IMPRESSION      1.  Chest pain, unspecified type          DISPOSITION/PLAN   DISPOSITION Decision To Discharge 03/11/2023 12:50:37 AM      PATIENT REFERRED TO:  Isaac Khan MD  [id number] [facility] [address] [phone]    In 3 days      DISCHARGE MEDICATIONS:  New Prescriptions    No medications on file          (Please note that portions of this note were completed with a voice recognition program.  Efforts were made to edit the dictations but occasionally words are mis-transcribed.)    Primo Randhawa MD (electronically signed)  Attending Emergency Physician         Primo Randhawa MD  03/11/23 1410

## 2023-03-11 NOTE — ED NOTES
Pt states chest pain. Pt states feels like a pressure. Stated earlier he felt like his heart was \"skipping a beat\". Pt speaking full sentences, breathing normally. Pt denies pain on inspiration and palpation. Pt also states was seen last week at [facility] for same issue. Pt a+o x's 4, abc's intact, skin warm and dry.      Estevan Hannon  03/10/23 3688

## 2023-03-11 NOTE — ED NOTES
Patient D/C instructions have been reviewed, patient is to follow up with PCP   Patient voiced understanding, no questions or concerns noted at this time.        [facility]  03/11/23 5457

## 2023-03-13 ENCOUNTER — PATIENT MESSAGE (OUTPATIENT)
Dept: PRIMARY CARE | Facility: CLINIC | Age: 73
End: 2023-03-13
Payer: MEDICARE

## 2023-03-13 LAB
EKG ATRIAL RATE: 76 BPM
EKG ATRIAL RATE: 80 BPM
EKG P AXIS: 3 DEGREES
EKG P AXIS: 9 DEGREES
EKG P-R INTERVAL: 304 MS
EKG P-R INTERVAL: 304 MS
EKG Q-T INTERVAL: 404 MS
EKG Q-T INTERVAL: 422 MS
EKG QRS DURATION: 100 MS
EKG QRS DURATION: 110 MS
EKG QTC CALCULATION (BAZETT): 426 MS
EKG QTC CALCULATION (BAZETT): 474 MS
EKG R AXIS: -23 DEGREES
EKG R AXIS: -26 DEGREES
EKG T AXIS: -2 DEGREES
EKG T AXIS: 13 DEGREES
EKG VENTRICULAR RATE: 67 BPM
EKG VENTRICULAR RATE: 76 BPM

## 2023-03-14 ENCOUNTER — HOSPITAL ENCOUNTER (EMERGENCY)
Age: 73
Discharge: HOME | End: 2023-03-14
Payer: MEDICARE

## 2023-03-14 ENCOUNTER — APPOINTMENT (OUTPATIENT)
Dept: GENERAL RADIOLOGY | Age: 73
End: 2023-03-14
Payer: MEDICARE

## 2023-03-14 VITALS
OXYGEN SATURATION: 98 % | BODY MASS INDEX: 27.4 KG/M2 | WEIGHT: 185 LBS | SYSTOLIC BLOOD PRESSURE: 111 MMHG | TEMPERATURE: 98.3 F | HEART RATE: 73 BPM | RESPIRATION RATE: 11 BRPM | HEIGHT: 69 IN | DIASTOLIC BLOOD PRESSURE: 89 MMHG

## 2023-03-14 DIAGNOSIS — R20.2 TINGLING: ICD-10-CM

## 2023-03-14 DIAGNOSIS — I10 PRIMARY HYPERTENSION: Primary | ICD-10-CM

## 2023-03-14 LAB
ALBUMIN SERPL-MCNC: 3.7 G/DL (ref 3.5–4.6)
ALP BLD-CCNC: 71 U/L (ref 35–104)
ALT SERPL-CCNC: 30 U/L (ref 0–41)
ANION GAP SERPL CALCULATED.3IONS-SCNC: 8 MEQ/L (ref 9–15)
AST SERPL-CCNC: 23 U/L (ref 0–40)
BASOPHILS ABSOLUTE: 0.2 K/UL (ref 0–0.2)
BASOPHILS RELATIVE PERCENT: 3 %
BILIRUB SERPL-MCNC: 0.4 MG/DL (ref 0.2–0.7)
BUN BLDV-MCNC: 20 MG/DL (ref 8–23)
BURR CELLS: ABNORMAL
CALCIUM SERPL-MCNC: 8.9 MG/DL (ref 8.5–9.9)
CHLORIDE BLD-SCNC: 99 MEQ/L (ref 95–107)
CO2: 22 MEQ/L (ref 20–31)
CREAT SERPL-MCNC: 0.95 MG/DL (ref 0.7–1.2)
EKG ATRIAL RATE: 74 BPM
EKG P AXIS: 37 DEGREES
EKG P-R INTERVAL: 318 MS
EKG Q-T INTERVAL: 404 MS
EKG QRS DURATION: 102 MS
EKG QTC CALCULATION (BAZETT): 448 MS
EKG R AXIS: 2 DEGREES
EKG T AXIS: 18 DEGREES
EKG VENTRICULAR RATE: 74 BPM
EOSINOPHILS ABSOLUTE: 0.5 K/UL (ref 0–0.7)
EOSINOPHILS RELATIVE PERCENT: 6 %
GFR SERPL CREATININE-BSD FRML MDRD: >60 ML/MIN/{1.73_M2}
GLOBULIN: 3.8 G/DL (ref 2.3–3.5)
GLUCOSE BLD-MCNC: 99 MG/DL (ref 70–99)
HCT VFR BLD CALC: 41.1 % (ref 42–52)
HEMOGLOBIN: 14.1 G/DL (ref 14–18)
LYMPHOCYTES ABSOLUTE: 1.9 K/UL (ref 1–4.8)
LYMPHOCYTES RELATIVE PERCENT: 24 %
MAGNESIUM: 1.8 MG/DL (ref 1.7–2.4)
MCH RBC QN AUTO: 31.9 PG (ref 27–31.3)
MCHC RBC AUTO-ENTMCNC: 34.3 % (ref 33–37)
MCV RBC AUTO: 92.7 FL (ref 79–92.2)
MONOCYTES ABSOLUTE: 1.4 K/UL (ref 0.2–0.8)
MONOCYTES RELATIVE PERCENT: 18.1 %
NEUTROPHILS ABSOLUTE: 3.9 K/UL (ref 1.4–6.5)
NEUTROPHILS RELATIVE PERCENT: 50 %
OVALOCYTES: ABNORMAL
PDW BLD-RTO: 13.7 % (ref 11.5–14.5)
PLATELET # BLD: 178 K/UL (ref 130–400)
PLATELET SLIDE REVIEW: NORMAL
POIKILOCYTES: ABNORMAL
POTASSIUM SERPL-SCNC: 4.1 MEQ/L (ref 3.4–4.9)
RBC # BLD: 4.43 M/UL (ref 4.7–6.1)
SODIUM BLD-SCNC: 129 MEQ/L (ref 135–144)
TOTAL PROTEIN: 7.5 G/DL (ref 6.3–8)
TROPONIN: <0.01 NG/ML (ref 0–0.01)
TSH SERPL DL<=0.05 MIU/L-ACNC: 2.04 UIU/ML (ref 0.44–3.86)
WBC # BLD: 7.8 K/UL (ref 4.8–10.8)

## 2023-03-14 PROCEDURE — 71045 X-RAY EXAM CHEST 1 VIEW: CPT

## 2023-03-14 PROCEDURE — 99285 EMERGENCY DEPT VISIT HI MDM: CPT

## 2023-03-14 PROCEDURE — 80053 COMPREHEN METABOLIC PANEL: CPT

## 2023-03-14 PROCEDURE — 83735 ASSAY OF MAGNESIUM: CPT

## 2023-03-14 PROCEDURE — 36415 COLL VENOUS BLD VENIPUNCTURE: CPT

## 2023-03-14 PROCEDURE — 84443 ASSAY THYROID STIM HORMONE: CPT

## 2023-03-14 PROCEDURE — 85025 COMPLETE CBC W/AUTO DIFF WBC: CPT

## 2023-03-14 PROCEDURE — 84484 ASSAY OF TROPONIN QUANT: CPT

## 2023-03-14 PROCEDURE — 93005 ELECTROCARDIOGRAM TRACING: CPT | Performed by: STUDENT IN AN ORGANIZED HEALTH CARE EDUCATION/TRAINING PROGRAM

## 2023-03-14 RX ORDER — LEVOTHYROXINE SODIUM 112 UG/1
112 TABLET ORAL
COMMUNITY
End: 2023-08-27 | Stop reason: DRUGHIGH

## 2023-03-14 ASSESSMENT — LIFESTYLE VARIABLES
HOW MANY STANDARD DRINKS CONTAINING ALCOHOL DO YOU HAVE ON A TYPICAL DAY: 1 OR 2
HOW OFTEN DO YOU HAVE A DRINK CONTAINING ALCOHOL: MONTHLY OR LESS

## 2023-03-14 ASSESSMENT — ENCOUNTER SYMPTOMS
EYE PAIN: 0
SORE THROAT: 0
SHORTNESS OF BREATH: 0
DIARRHEA: 0
NAUSEA: 0
BACK PAIN: 0
CHEST TIGHTNESS: 0

## 2023-03-14 ASSESSMENT — PAIN - FUNCTIONAL ASSESSMENT
PAIN_FUNCTIONAL_ASSESSMENT: NONE - DENIES PAIN
PAIN_FUNCTIONAL_ASSESSMENT: NONE - DENIES PAIN

## 2023-03-14 NOTE — ED TRIAGE NOTES
Patient brought in by [facility]. Patient stated he woke up and was not feeling right. Patient states he felt a tingling sensation around his body. Patient states he has this feeling when his blood pressure is high. Patient took his blood pressure at home and systolic was in the 598N. Patient is alert and oriented at this time. Patient skin is pink, warm, and dry. Patient respirations are even and unlabored at this time. Patient had bypass surgery in 2018. [employer] gave 324 of Aspirin in route to ED.

## 2023-03-14 NOTE — ED NOTES
Patient alert and oriented on discharge, patient also ambulatory on discharge. Patient skin is pink, dry, and warm at this time. Patient respirations are even and unlabored. Patient states having no pain at this time. Discharge instructions reviewed with patient. Patient verbalized understanding and states no questions at this time.      Morales Hsu RN  03/14/23 6106

## 2023-03-14 NOTE — ED PROVIDER NOTES
[address] [facility] ED  eMERGENCYdEPARTMENT eNCOUnter      Pt Name: Jackie Hensley  MRN: [id number]  [place] 1950  Date of evaluation: 3/14/2023  Provider:Charles Dallas Goodell, PA-C    CHIEF COMPLAINT           HPI  Jackie Hensley is a 67 y.o. male per chart review has a h/o ACS status post bypass 4 and half years ago, hypertension, angina presenting for tingly sensation. Patient reports sudden onset, whole body, hard to describe tingle like sensation that he gets when he feels like his blood pressure is high. He took at home and states systolic was in the 639V. Patient brought in by squad. He denies chest pain, shortness of breath, fever, chills. Patient currently has a cardiac monitor as he had frequent PVCs and was taken off his metoprolol and they are monitoring his heart. He denies nausea, fever, chills. Patient states symptoms are resolved by the time he got to the ED. ROS  Review of Systems   Constitutional:  Negative for chills, fatigue and fever. HENT:  Negative for ear pain, hearing loss and sore throat. Eyes:  Negative for pain and visual disturbance. Respiratory:  Negative for chest tightness and shortness of breath. Gastrointestinal:  Negative for diarrhea and nausea. Endocrine: Negative for cold intolerance. Genitourinary:  Negative for hematuria. Musculoskeletal:  Negative for back pain. Skin:  Negative for rash and wound. Neurological:  Negative for dizziness and headaches. Tingling sensations   Psychiatric/Behavioral:  Negative for behavioral problems and confusion. Except as noted above the remainder of the review of systems was reviewed and negative.        PAST MEDICAL HISTORY     Past Medical History:   Diagnosis Date    CAD (coronary artery disease)     Eczema     Hyperlipidemia     Hypertension     Hypothyroidism          SURGICAL HISTORY       Past Surgical History:   Procedure Laterality Date    COLONOSCOPY  10/01/14    DR. Bridget Cates    CORONARY ARTERY BYPASS GRAFT  10/11/2018    DR. DUMONT    CORONARY ARTERY BYPASS GRAFT      HERNIA REPAIR  01/2020    [facility]    KNEE SURGERY      left    VASECTOMY           CURRENTMEDICATIONS       Previous Medications    ASPIRIN 81 MG TABLET    Take 1 tablet by mouth 2 times daily With Food    ATORVASTATIN (LIPITOR) 80 MG TABLET    Take 1 tablet by mouth daily    LEVOTHYROXINE (SYNTHROID) 112 MCG TABLET    Take 1 tablet by mouth daily    LISINOPRIL (PRINIVIL;ZESTRIL) 5 MG TABLET    Take 1 tablet by mouth daily    METOPROLOL SUCCINATE (TOPROL XL) 50 MG EXTENDED RELEASE TABLET    TAKE 1 TABLET BY MOUTH EVERY DAY       ALLERGIES     Vancomycin    FAMILY HISTORY       Family History   Problem Relation Age of Onset    High Blood Pressure Mother     High Cholesterol Father     Other Father         thyroid    Other Sister         thyroid    Other Brother         throid          SOCIAL HISTORY       Social History     Socioeconomic History    Marital status:    Occupational History    Occupation: [employer]      Comment: [employer]   Tobacco Use    Smoking status: Never    Smokeless tobacco: Never   Substance and Sexual Activity    Alcohol use: Yes     Types: 2 Glasses of wine, 2 Cans of beer per week     Comment: rare    Drug use: No         PHYSICAL EXAM       ED Triage Vitals   BP Temp Temp Source Heart Rate Resp SpO2 Height Weight   03/14/23 0218 03/14/23 0216 03/14/23 0216 03/14/23 0216 03/14/23 0216 03/14/23 0216 -- --   (!) 165/108 98.3 °F (36.8 °C) Oral 79 20 99 %         Physical Exam  Constitutional:       Appearance: Normal appearance. HENT:      Head: Normocephalic and atraumatic. Nose: Nose normal.      Mouth/Throat:      Mouth: Mucous membranes are moist.      Pharynx: No oropharyngeal exudate or posterior oropharyngeal erythema. Eyes:      Extraocular Movements: Extraocular movements intact.       Conjunctiva/sclera: Conjunctivae normal.      Pupils: Pupils are equal, round, and reactive to light. Cardiovascular:      Rate and Rhythm: Normal rate and regular rhythm. Heart sounds: Normal heart sounds. Pulmonary:      Effort: Pulmonary effort is normal.      Breath sounds: Normal breath sounds. No wheezing or rhonchi. Abdominal:      General: Bowel sounds are normal.      Palpations: Abdomen is soft. Tenderness: There is no abdominal tenderness. There is no guarding. Musculoskeletal:         General: No deformity. Normal range of motion. Cervical back: Normal range of motion and neck supple. Skin:     General: Skin is warm and dry. Coloration: Skin is not jaundiced. Neurological:      General: No focal deficit present. Mental Status: He is alert and oriented to person, place, and time. Psychiatric:         Mood and Affect: Mood normal.         Behavior: Behavior normal.         MDM  This is a 57-year-old male presenting with nonspecific symptoms. Patient is afebrile and hemodynamically stable. EKG shows NSR with HR 74, normal axis, normal intervals, no ST changes compared to 3/10/23. Troponin negative. TSH normal. CXR unremarkable. Low suspicion for ACS today. Pt states this did not present like his usual cardiac pain. His BP came down to 122/82 without intervention and pt had complete resolution of symptoms. He will follow up with his PCP and return with change or worsening sx. FINAL IMPRESSION      1. Primary hypertension    2.  Tingling          DISPOSITION/PLAN   DISPOSITION Decision To Discharge 03/14/2023 04:19:54 AM        DISCHARGE MEDICATIONS:  [unfilled]         Judit Espana PA-C(electronically signed)  Attending Emergency Physician          Judit Espana PA-C  03/14/23 0421

## 2023-03-14 NOTE — ED NOTES
Writer notified Beckie TELLO of patients sodium level. Beckie TELLO states no orders for sodium at this time.      Nithya Arellano RN  03/14/23 2059

## 2023-03-15 ENCOUNTER — PATIENT MESSAGE (OUTPATIENT)
Dept: PRIMARY CARE | Facility: CLINIC | Age: 73
End: 2023-03-15
Payer: MEDICARE

## 2023-03-16 ENCOUNTER — PATIENT OUTREACH (OUTPATIENT)
Dept: PRIMARY CARE | Facility: CLINIC | Age: 73
End: 2023-03-16
Payer: MEDICARE

## 2023-03-16 NOTE — PROGRESS NOTES
Call made to patient regarding recent appointment with PCP on (3/10/23) after hospitalization for (chest pain).  At time of outreach call the patient feels as if their condition has remained the same since last visit.  Patient verbalizes understanding of new orders including labs, therapy, HHC, and DME.   Patient verbalizes understanding of medications including changes made during hospitalization and with PCP/specialty appts.  Patient verbalizes understanding of referrals needed to specialist.  Patient verbalized understanding future plan of care due to diagnosis.  Any further questions or concerns at this time for PCP? No  Does patient appear to have CCM needs and will need referral to nurse after call back? No

## 2023-04-03 LAB — PROSTATE SPECIFIC AG (NG/ML) IN SER/PLAS: 12.76 NG/ML (ref 0–4)

## 2023-04-25 RX ORDER — METOPROLOL TARTRATE 25 MG/1
25 TABLET, FILM COATED ORAL DAILY
COMMUNITY
End: 2023-08-02 | Stop reason: ALTCHOICE

## 2023-04-25 RX ORDER — LISINOPRIL 20 MG/1
20 TABLET ORAL DAILY
COMMUNITY

## 2023-06-17 ENCOUNTER — HOSPITAL ENCOUNTER (OUTPATIENT)
Age: 73
Setting detail: OBSERVATION
Discharge: HOME | End: 2023-06-18
Attending: EMERGENCY MEDICINE | Admitting: INTERNAL MEDICINE
Payer: MEDICARE

## 2023-06-17 ENCOUNTER — APPOINTMENT (OUTPATIENT)
Dept: CT IMAGING | Age: 73
End: 2023-06-17
Payer: MEDICARE

## 2023-06-17 ENCOUNTER — APPOINTMENT (OUTPATIENT)
Dept: GENERAL RADIOLOGY | Age: 73
End: 2023-06-17
Payer: MEDICARE

## 2023-06-17 DIAGNOSIS — I65.29 CAROTID ATHEROSCLEROSIS, UNSPECIFIED LATERALITY: ICD-10-CM

## 2023-06-17 DIAGNOSIS — R07.9 CHEST PAIN, UNSPECIFIED TYPE: Primary | ICD-10-CM

## 2023-06-17 DIAGNOSIS — I67.2 ATHEROSCLEROSIS OF VERTEBRAL ARTERY: ICD-10-CM

## 2023-06-17 DIAGNOSIS — R42 VERTIGO: ICD-10-CM

## 2023-06-17 LAB
ALBUMIN SERPL-MCNC: 3.7 G/DL (ref 3.5–4.6)
ALP SERPL-CCNC: 78 U/L (ref 35–104)
ALT SERPL-CCNC: 29 U/L (ref 0–41)
ANION GAP SERPL CALCULATED.3IONS-SCNC: 14 MEQ/L (ref 9–15)
ANISOCYTOSIS BLD QL SMEAR: ABNORMAL
APTT PPP: 29 SEC (ref 24.4–36.8)
AST SERPL-CCNC: 22 U/L (ref 0–40)
BASOPHILS # BLD: 0.2 K/UL (ref 0–0.2)
BASOPHILS NFR BLD: 2 %
BILIRUB SERPL-MCNC: 0.4 MG/DL (ref 0.2–0.7)
BNP BLD-MCNC: 124 PG/ML
BUN SERPL-MCNC: 18 MG/DL (ref 8–23)
CALCIUM SERPL-MCNC: 9 MG/DL (ref 8.5–9.9)
CHLORIDE SERPL-SCNC: 101 MEQ/L (ref 95–107)
CO2 SERPL-SCNC: 21 MEQ/L (ref 20–31)
CREAT SERPL-MCNC: 0.78 MG/DL (ref 0.7–1.2)
EOSINOPHIL # BLD: 0.7 K/UL (ref 0–0.7)
EOSINOPHIL NFR BLD: 7 %
ERYTHROCYTE [DISTWIDTH] IN BLOOD BY AUTOMATED COUNT: 14.3 % (ref 11.5–14.5)
ETHANOL PERCENT: NORMAL G/DL
ETHANOLAMINE SERPL-MCNC: <10 MG/DL (ref 0–0.08)
GLOBULIN SER CALC-MCNC: 3.7 G/DL (ref 2.3–3.5)
GLUCOSE SERPL-MCNC: 94 MG/DL (ref 70–99)
HCT VFR BLD AUTO: 40.6 % (ref 42–52)
HGB BLD-MCNC: 13.6 G/DL (ref 14–18)
INR PPP: 1
LYMPHOCYTES # BLD: 2.6 K/UL (ref 1–4.8)
LYMPHOCYTES NFR BLD: 24 %
MAGNESIUM SERPL-MCNC: 1.8 MG/DL (ref 1.7–2.4)
MCH RBC QN AUTO: 31.4 PG (ref 27–31.3)
MCHC RBC AUTO-ENTMCNC: 33.4 % (ref 33–37)
MCV RBC AUTO: 94.1 FL (ref 79–92.2)
MONOCYTES # BLD: 1.8 K/UL (ref 0.2–0.8)
MONOCYTES NFR BLD: 19 %
NEUTROPHILS # BLD: 4.3 K/UL (ref 1.4–6.5)
NEUTS SEG NFR BLD: 45 %
PLATELET # BLD AUTO: 191 K/UL (ref 130–400)
PLATELET BLD QL SMEAR: ADEQUATE
POIKILOCYTOSIS BLD QL SMEAR: ABNORMAL
POTASSIUM SERPL-SCNC: 4 MEQ/L (ref 3.4–4.9)
PROT SERPL-MCNC: 7.4 G/DL (ref 6.3–8)
PROTHROMBIN TIME: 13.5 SEC (ref 12.3–14.9)
RBC # BLD AUTO: 4.31 M/UL (ref 4.7–6.1)
SODIUM SERPL-SCNC: 136 MEQ/L (ref 135–144)
TROPONIN T SERPL-MCNC: <0.01 NG/ML (ref 0–0.01)
VARIANT LYMPHS NFR BLD: 3 %
WBC # BLD AUTO: 9.6 K/UL (ref 4.8–10.8)

## 2023-06-17 PROCEDURE — 6370000000 HC RX 637 (ALT 250 FOR IP): Performed by: INTERNAL MEDICINE

## 2023-06-17 PROCEDURE — 96372 THER/PROPH/DIAG INJ SC/IM: CPT

## 2023-06-17 PROCEDURE — G0378 HOSPITAL OBSERVATION PER HR: HCPCS

## 2023-06-17 PROCEDURE — 83735 ASSAY OF MAGNESIUM: CPT

## 2023-06-17 PROCEDURE — 99222 1ST HOSP IP/OBS MODERATE 55: CPT | Performed by: INTERNAL MEDICINE

## 2023-06-17 PROCEDURE — 97166 OT EVAL MOD COMPLEX 45 MIN: CPT

## 2023-06-17 PROCEDURE — 99223 1ST HOSP IP/OBS HIGH 75: CPT | Performed by: PSYCHIATRY & NEUROLOGY

## 2023-06-17 PROCEDURE — 36415 COLL VENOUS BLD VENIPUNCTURE: CPT

## 2023-06-17 PROCEDURE — 70496 CT ANGIOGRAPHY HEAD: CPT

## 2023-06-17 PROCEDURE — 85610 PROTHROMBIN TIME: CPT

## 2023-06-17 PROCEDURE — 96374 THER/PROPH/DIAG INJ IV PUSH: CPT

## 2023-06-17 PROCEDURE — 93005 ELECTROCARDIOGRAM TRACING: CPT | Performed by: EMERGENCY MEDICINE

## 2023-06-17 PROCEDURE — 71045 X-RAY EXAM CHEST 1 VIEW: CPT

## 2023-06-17 PROCEDURE — 84484 ASSAY OF TROPONIN QUANT: CPT

## 2023-06-17 PROCEDURE — 70450 CT HEAD/BRAIN W/O DYE: CPT

## 2023-06-17 PROCEDURE — 6360000002 HC RX W HCPCS: Performed by: INTERNAL MEDICINE

## 2023-06-17 PROCEDURE — 99285 EMERGENCY DEPT VISIT HI MDM: CPT

## 2023-06-17 PROCEDURE — 2580000003 HC RX 258: Performed by: EMERGENCY MEDICINE

## 2023-06-17 PROCEDURE — 85025 COMPLETE CBC W/AUTO DIFF WBC: CPT

## 2023-06-17 PROCEDURE — 2580000003 HC RX 258: Performed by: INTERNAL MEDICINE

## 2023-06-17 PROCEDURE — 6360000004 HC RX CONTRAST MEDICATION: Performed by: EMERGENCY MEDICINE

## 2023-06-17 PROCEDURE — 6370000000 HC RX 637 (ALT 250 FOR IP): Performed by: EMERGENCY MEDICINE

## 2023-06-17 PROCEDURE — 97162 PT EVAL MOD COMPLEX 30 MIN: CPT

## 2023-06-17 PROCEDURE — 85730 THROMBOPLASTIN TIME PARTIAL: CPT

## 2023-06-17 PROCEDURE — 1210000000 HC MED SURG R&B

## 2023-06-17 PROCEDURE — 6360000002 HC RX W HCPCS: Performed by: EMERGENCY MEDICINE

## 2023-06-17 PROCEDURE — 82077 ASSAY SPEC XCP UR&BREATH IA: CPT

## 2023-06-17 PROCEDURE — 83880 ASSAY OF NATRIURETIC PEPTIDE: CPT

## 2023-06-17 PROCEDURE — 70498 CT ANGIOGRAPHY NECK: CPT

## 2023-06-17 PROCEDURE — 80053 COMPREHEN METABOLIC PANEL: CPT

## 2023-06-17 RX ORDER — ACETAMINOPHEN 650 MG/1
650 SUPPOSITORY RECTAL EVERY 6 HOURS PRN
Status: DISCONTINUED | OUTPATIENT
Start: 2023-06-17 | End: 2023-06-18 | Stop reason: HOSPADM

## 2023-06-17 RX ORDER — LISINOPRIL 20 MG/1
20 TABLET ORAL DAILY
Status: DISCONTINUED | OUTPATIENT
Start: 2023-06-17 | End: 2023-06-18 | Stop reason: HOSPADM

## 2023-06-17 RX ORDER — LEVOTHYROXINE SODIUM 112 UG/1
112 TABLET ORAL DAILY
Status: DISCONTINUED | OUTPATIENT
Start: 2023-06-17 | End: 2023-06-18 | Stop reason: HOSPADM

## 2023-06-17 RX ORDER — SODIUM CHLORIDE 9 MG/ML
INJECTION, SOLUTION INTRAVENOUS PRN
Status: DISCONTINUED | OUTPATIENT
Start: 2023-06-17 | End: 2023-06-18 | Stop reason: HOSPADM

## 2023-06-17 RX ORDER — SODIUM CHLORIDE 0.9 % (FLUSH) 0.9 %
5-40 SYRINGE (ML) INJECTION EVERY 12 HOURS SCHEDULED
Status: DISCONTINUED | OUTPATIENT
Start: 2023-06-17 | End: 2023-06-18 | Stop reason: HOSPADM

## 2023-06-17 RX ORDER — ONDANSETRON 2 MG/ML
4 INJECTION INTRAMUSCULAR; INTRAVENOUS EVERY 6 HOURS PRN
Status: DISCONTINUED | OUTPATIENT
Start: 2023-06-17 | End: 2023-06-18 | Stop reason: HOSPADM

## 2023-06-17 RX ORDER — ATORVASTATIN CALCIUM 80 MG/1
80 TABLET, FILM COATED ORAL DAILY
Status: DISCONTINUED | OUTPATIENT
Start: 2023-06-17 | End: 2023-06-18 | Stop reason: HOSPADM

## 2023-06-17 RX ORDER — MECLIZINE HCL 12.5 MG/1
12.5 TABLET ORAL ONCE
Status: COMPLETED | OUTPATIENT
Start: 2023-06-17 | End: 2023-06-17

## 2023-06-17 RX ORDER — MECLIZINE HYDROCHLORIDE 25 MG/1
12.5 TABLET ORAL 3 TIMES DAILY PRN
Status: DISCONTINUED | OUTPATIENT
Start: 2023-06-17 | End: 2023-06-17

## 2023-06-17 RX ORDER — ENOXAPARIN SODIUM 100 MG/ML
40 INJECTION SUBCUTANEOUS DAILY
Status: DISCONTINUED | OUTPATIENT
Start: 2023-06-17 | End: 2023-06-18 | Stop reason: HOSPADM

## 2023-06-17 RX ORDER — ONDANSETRON 4 MG/1
4 TABLET, ORALLY DISINTEGRATING ORAL EVERY 8 HOURS PRN
Status: DISCONTINUED | OUTPATIENT
Start: 2023-06-17 | End: 2023-06-18 | Stop reason: HOSPADM

## 2023-06-17 RX ORDER — ONDANSETRON 2 MG/ML
4 INJECTION INTRAMUSCULAR; INTRAVENOUS ONCE
Status: COMPLETED | OUTPATIENT
Start: 2023-06-17 | End: 2023-06-17

## 2023-06-17 RX ORDER — ASPIRIN 81 MG/1
81 TABLET ORAL 2 TIMES DAILY
Status: DISCONTINUED | OUTPATIENT
Start: 2023-06-17 | End: 2023-06-18 | Stop reason: HOSPADM

## 2023-06-17 RX ORDER — LISINOPRIL 20 MG/1
20 TABLET ORAL DAILY
COMMUNITY

## 2023-06-17 RX ORDER — 0.9 % SODIUM CHLORIDE 0.9 %
1000 INTRAVENOUS SOLUTION INTRAVENOUS ONCE
Status: COMPLETED | OUTPATIENT
Start: 2023-06-17 | End: 2023-06-17

## 2023-06-17 RX ORDER — SODIUM CHLORIDE 0.9 % (FLUSH) 0.9 %
5-40 SYRINGE (ML) INJECTION PRN
Status: DISCONTINUED | OUTPATIENT
Start: 2023-06-17 | End: 2023-06-18 | Stop reason: HOSPADM

## 2023-06-17 RX ORDER — ACETAMINOPHEN 325 MG/1
650 TABLET ORAL EVERY 6 HOURS PRN
Status: DISCONTINUED | OUTPATIENT
Start: 2023-06-17 | End: 2023-06-18 | Stop reason: HOSPADM

## 2023-06-17 RX ORDER — MECLIZINE HYDROCHLORIDE 25 MG/1
25 TABLET ORAL 3 TIMES DAILY
Status: DISCONTINUED | OUTPATIENT
Start: 2023-06-17 | End: 2023-06-18 | Stop reason: HOSPADM

## 2023-06-17 RX ORDER — POLYETHYLENE GLYCOL 3350 17 G/17G
17 POWDER, FOR SOLUTION ORAL DAILY PRN
Status: DISCONTINUED | OUTPATIENT
Start: 2023-06-17 | End: 2023-06-18 | Stop reason: HOSPADM

## 2023-06-17 RX ORDER — CETIRIZINE HYDROCHLORIDE 10 MG/1
10 TABLET ORAL DAILY
Status: DISCONTINUED | OUTPATIENT
Start: 2023-06-17 | End: 2023-06-18 | Stop reason: HOSPADM

## 2023-06-17 RX ORDER — LORATADINE 10 MG/1
10 TABLET ORAL DAILY
COMMUNITY

## 2023-06-17 RX ORDER — METOPROLOL SUCCINATE 25 MG/1
25 TABLET, EXTENDED RELEASE ORAL DAILY
Status: DISCONTINUED | OUTPATIENT
Start: 2023-06-17 | End: 2023-06-18 | Stop reason: HOSPADM

## 2023-06-17 RX ORDER — NITROGLYCERIN 0.4 MG/1
0.4 TABLET SUBLINGUAL EVERY 5 MIN PRN
COMMUNITY

## 2023-06-17 RX ADMIN — ASPIRIN 81 MG: 81 TABLET, COATED ORAL at 10:10

## 2023-06-17 RX ADMIN — Medication 10 ML: at 10:14

## 2023-06-17 RX ADMIN — ATORVASTATIN CALCIUM 80 MG: 80 TABLET, FILM COATED ORAL at 20:33

## 2023-06-17 RX ADMIN — ONDANSETRON 4 MG: 2 INJECTION INTRAMUSCULAR; INTRAVENOUS at 04:59

## 2023-06-17 RX ADMIN — MECLIZINE HYDROCHLORIDE 25 MG: 25 TABLET ORAL at 15:53

## 2023-06-17 RX ADMIN — CETIRIZINE HYDROCHLORIDE 10 MG: 10 TABLET, FILM COATED ORAL at 10:10

## 2023-06-17 RX ADMIN — SODIUM CHLORIDE 1000 ML: 9 INJECTION, SOLUTION INTRAVENOUS at 04:59

## 2023-06-17 RX ADMIN — Medication 5 ML: at 21:00

## 2023-06-17 RX ADMIN — ASPIRIN 81 MG: 81 TABLET, COATED ORAL at 20:33

## 2023-06-17 RX ADMIN — MECLIZINE HYDROCHLORIDE 25 MG: 25 TABLET ORAL at 10:10

## 2023-06-17 RX ADMIN — MECLIZINE HYDROCHLORIDE 25 MG: 25 TABLET ORAL at 20:33

## 2023-06-17 RX ADMIN — METOPROLOL SUCCINATE 25 MG: 25 TABLET, EXTENDED RELEASE ORAL at 10:10

## 2023-06-17 RX ADMIN — LISINOPRIL 20 MG: 20 TABLET ORAL at 10:10

## 2023-06-17 RX ADMIN — MECLIZINE 12.5 MG: 12.5 TABLET ORAL at 04:59

## 2023-06-17 RX ADMIN — ENOXAPARIN SODIUM 40 MG: 100 INJECTION SUBCUTANEOUS at 10:13

## 2023-06-17 RX ADMIN — IOPAMIDOL 75 ML: 612 INJECTION, SOLUTION INTRAVENOUS at 05:20

## 2023-06-17 RX ADMIN — LEVOTHYROXINE SODIUM 112 MCG: 0.11 TABLET ORAL at 10:10

## 2023-06-17 ASSESSMENT — ENCOUNTER SYMPTOMS
CHEST TIGHTNESS: 1
TROUBLE SWALLOWING: 0
VOMITING: 0
COUGH: 0
COLOR CHANGE: 0
PHOTOPHOBIA: 0
ABDOMINAL PAIN: 0
BACK PAIN: 0
CHOKING: 0
SHORTNESS OF BREATH: 0
NAUSEA: 0

## 2023-06-17 ASSESSMENT — PAIN - FUNCTIONAL ASSESSMENT
PAIN_FUNCTIONAL_ASSESSMENT: NONE - DENIES PAIN

## 2023-06-17 ASSESSMENT — HEART SCORE: ECG: 0

## 2023-06-17 ASSESSMENT — LIFESTYLE VARIABLES: HOW OFTEN DO YOU HAVE A DRINK CONTAINING ALCOHOL: NEVER

## 2023-06-18 VITALS
BODY MASS INDEX: 26.36 KG/M2 | WEIGHT: 178 LBS | DIASTOLIC BLOOD PRESSURE: 83 MMHG | HEIGHT: 69 IN | SYSTOLIC BLOOD PRESSURE: 149 MMHG | OXYGEN SATURATION: 95 % | HEART RATE: 76 BPM | TEMPERATURE: 97.4 F | RESPIRATION RATE: 20 BRPM

## 2023-06-18 PROCEDURE — 99232 SBSQ HOSP IP/OBS MODERATE 35: CPT | Performed by: PSYCHIATRY & NEUROLOGY

## 2023-06-18 PROCEDURE — G0378 HOSPITAL OBSERVATION PER HR: HCPCS

## 2023-06-18 PROCEDURE — 6370000000 HC RX 637 (ALT 250 FOR IP): Performed by: INTERNAL MEDICINE

## 2023-06-18 PROCEDURE — 2580000003 HC RX 258: Performed by: INTERNAL MEDICINE

## 2023-06-18 PROCEDURE — 96372 THER/PROPH/DIAG INJ SC/IM: CPT

## 2023-06-18 PROCEDURE — 6360000002 HC RX W HCPCS: Performed by: INTERNAL MEDICINE

## 2023-06-18 RX ORDER — MECLIZINE HYDROCHLORIDE 25 MG/1
25 TABLET ORAL 3 TIMES DAILY
Qty: 30 TABLET | Refills: 0 | Status: SHIPPED | OUTPATIENT
Start: 2023-06-18 | End: 2023-06-28

## 2023-06-18 RX ADMIN — ENOXAPARIN SODIUM 40 MG: 100 INJECTION SUBCUTANEOUS at 08:20

## 2023-06-18 RX ADMIN — Medication 10 ML: at 08:20

## 2023-06-18 RX ADMIN — ASPIRIN 81 MG: 81 TABLET, COATED ORAL at 08:20

## 2023-06-18 RX ADMIN — CETIRIZINE HYDROCHLORIDE 10 MG: 10 TABLET, FILM COATED ORAL at 08:20

## 2023-06-18 RX ADMIN — LEVOTHYROXINE SODIUM 112 MCG: 0.11 TABLET ORAL at 08:20

## 2023-06-18 RX ADMIN — MECLIZINE HYDROCHLORIDE 25 MG: 25 TABLET ORAL at 08:20

## 2023-06-18 RX ADMIN — METOPROLOL SUCCINATE 25 MG: 25 TABLET, EXTENDED RELEASE ORAL at 08:20

## 2023-06-18 RX ADMIN — LISINOPRIL 20 MG: 20 TABLET ORAL at 08:19

## 2023-06-18 ASSESSMENT — ENCOUNTER SYMPTOMS
DIARRHEA: 0
SHORTNESS OF BREATH: 0
COUGH: 0

## 2023-06-19 LAB
EKG ATRIAL RATE: 64 BPM
EKG P AXIS: 7 DEGREES
EKG P-R INTERVAL: 280 MS
EKG Q-T INTERVAL: 420 MS
EKG QRS DURATION: 100 MS
EKG QTC CALCULATION (BAZETT): 433 MS
EKG R AXIS: -22 DEGREES
EKG T AXIS: 8 DEGREES
EKG VENTRICULAR RATE: 64 BPM

## 2023-06-19 NOTE — PROGRESS NOTES
Physical Therapy  Facility/Department: [facility] T776/[id number]  Physical Therapy Discharge      NAME: Tom Zamudio    : 1950 (87 y.o.)  MRN: [id number]    Account: [de-identified]  Gender: male      Patient has been discharged from acute care hospital. DC patient from current PT program.      Electronically signed by Annie Stapleton PT on 23 at 12:45 PM EDT

## 2023-06-20 ENCOUNTER — PATIENT OUTREACH (OUTPATIENT)
Dept: PRIMARY CARE | Facility: CLINIC | Age: 73
End: 2023-06-20
Payer: MEDICARE

## 2023-06-20 RX ORDER — METOPROLOL SUCCINATE 50 MG/1
1 TABLET, EXTENDED RELEASE ORAL DAILY
COMMUNITY
End: 2023-08-02 | Stop reason: ALTCHOICE

## 2023-06-20 RX ORDER — MECLIZINE HYDROCHLORIDE 25 MG/1
1 TABLET ORAL 3 TIMES DAILY
Qty: 30 TABLET | Refills: 0 | COMMUNITY
Start: 2023-06-18 | End: 2023-06-28

## 2023-06-20 RX ORDER — LORATADINE 10 MG/1
1 TABLET ORAL DAILY
COMMUNITY

## 2023-06-20 NOTE — PROGRESS NOTES
Discharge Facility: [facility]  Discharge Diagnosis: Chest pain, unspecified type (Primary Dx); Vertigo; Atherosclerosis of vertebral artery; Carotid atherosclerosis, unspecified laterality   Admission Date: 06/17/2023   Discharge Date:  06/18/2023     PCP Appointment Date: TBD-patient declined appt at time of outreach call  Specialist Appointment Date: TBD  Hospital Encounter and Summary: Linked  See discharge assessment below for further details  Engagement  Call Start Time: 1105 (6/20/2023 11:08 AM)    Medications  Medications reviewed with patient/caregiver?: Yes (new meds/changes) (6/20/2023 11:08 AM)  Is the patient having any side effects they believe may be caused by any medication additions or changes?: No (6/20/2023 11:08 AM)  Does the patient have all medications ordered at discharge?: Yes (6/20/2023 11:08 AM)  Prescription Comments: see med list (new med: meclizine) (6/20/2023 11:08 AM)  Is the patient taking all medications as directed (includes completed medication regime)?: Yes (6/20/2023 11:08 AM)  Care Management Interventions: Provided patient education (6/20/2023 11:08 AM)    Appointments  Does the patient have a primary care provider?: Yes (6/20/2023 11:08 AM)  Care Management Interventions: Educated patient on importance of making appointment (Patient declined appt with PCP at time of outreach call) (6/20/2023 11:08 AM)  Has the patient kept scheduled appointments due by today?: Yes (6/20/2023 11:08 AM)    Self Management  What is the home health agency?: denies need (6/20/2023 11:08 AM)    Patient Teaching  Does the patient have access to their discharge instructions?: Yes (6/20/2023 11:08 AM)  Care Management Interventions: Reviewed instructions with patient (6/20/2023 11:08 AM)  What is the patient's perception of their health status since discharge?: Improving (Patient states he is still getting episodes of vertigo depending on his position while lying down but states he  believes the meclizine is helping as this has relieved most of his symptoms.) (6/20/2023 11:08 AM)  Is the patient/caregiver able to teach back the hierarchy of who to call/visit for symptoms/problems? PCP, Specialist, Home Health nurse, Urgent Care, ED, 911: Yes (6/20/2023 11:08 AM)

## 2023-07-17 LAB
PROSTATE SPECIFIC AG (NG/ML) IN SER/PLAS: 9.62 NG/ML (ref 0–4)
PROSTATE SPECIFIC ANTIGEN,SCREEN: NORMAL

## 2023-07-31 LAB
ALANINE AMINOTRANSFERASE (SGPT) (U/L) IN SER/PLAS: 23 U/L
ALKALINE PHOSPHATASE (U/L) IN SER/PLAS: 66 U/L
ASPARTATE AMINOTRANSFERASE (SGOT) (U/L) IN SER/PLAS: 21 U/L
BILIRUBIN, TOTAL  (MG/DL) IN SER/PLAS: 0.6 MG/DL (ref 0–1.2)
CHOLESTEROL (MG/DL) IN SER/PLAS: 127 MG/DL (ref 0–199)
CREATININE (MG/DL) IN SER/PLAS: 0.9 MG/DL
GLUCOSE: 94 MG/DL
HDL-CHOLESTEROL (MG/DL) IN SER/PLAS: 30 MG/DL
HEMOGLOBIN A1C/HEMOGLOBIN TOTAL IN BLOOD: 5.7 %
LDL CHOLESTEROL: 103 MG/DL
POTASSIUM (MMOL/L) IN SER/PLAS: 4.5 MMOL/L
SODIUM (MMOL/L) IN SER/PLAS: 139 MMOL/L
TRIGLYCERIDES  (MG/DL) IN SER/PLAS: 77 MG/DL (ref 0–149)
UREA NITROGEN (MG/DL) IN SER/PLAS: 15 MG/DL

## 2023-08-02 ENCOUNTER — OFFICE VISIT (OUTPATIENT)
Dept: PRIMARY CARE | Facility: CLINIC | Age: 73
End: 2023-08-02
Payer: MEDICARE

## 2023-08-02 VITALS
OXYGEN SATURATION: 96 % | DIASTOLIC BLOOD PRESSURE: 80 MMHG | SYSTOLIC BLOOD PRESSURE: 114 MMHG | WEIGHT: 184.4 LBS | HEIGHT: 69 IN | HEART RATE: 74 BPM | BODY MASS INDEX: 27.31 KG/M2 | RESPIRATION RATE: 22 BRPM | TEMPERATURE: 97.4 F

## 2023-08-02 DIAGNOSIS — I10 ESSENTIAL HYPERTENSION: ICD-10-CM

## 2023-08-02 DIAGNOSIS — H81.10 BENIGN PAROXYSMAL POSITIONAL VERTIGO, UNSPECIFIED LATERALITY: Primary | ICD-10-CM

## 2023-08-02 PROCEDURE — 99213 OFFICE O/P EST LOW 20 MIN: CPT | Performed by: FAMILY MEDICINE

## 2023-08-02 PROCEDURE — 3079F DIAST BP 80-89 MM HG: CPT | Performed by: FAMILY MEDICINE

## 2023-08-02 PROCEDURE — 1159F MED LIST DOCD IN RCRD: CPT | Performed by: FAMILY MEDICINE

## 2023-08-02 PROCEDURE — 1160F RVW MEDS BY RX/DR IN RCRD: CPT | Performed by: FAMILY MEDICINE

## 2023-08-02 PROCEDURE — 1036F TOBACCO NON-USER: CPT | Performed by: FAMILY MEDICINE

## 2023-08-02 PROCEDURE — 3074F SYST BP LT 130 MM HG: CPT | Performed by: FAMILY MEDICINE

## 2023-08-02 RX ORDER — METOPROLOL SUCCINATE 25 MG/1
25 TABLET, EXTENDED RELEASE ORAL DAILY
COMMUNITY

## 2023-08-02 ASSESSMENT — PATIENT HEALTH QUESTIONNAIRE - PHQ9
SUM OF ALL RESPONSES TO PHQ9 QUESTIONS 1 AND 2: 0
2. FEELING DOWN, DEPRESSED OR HOPELESS: NOT AT ALL
1. LITTLE INTEREST OR PLEASURE IN DOING THINGS: NOT AT ALL

## 2023-08-02 NOTE — ASSESSMENT & PLAN NOTE
The nature of vertigo was discussed.  Educational material provided.  Patient was advised to take meclizine as needed. He was advised not to drive or operate machinery during the episodes or if the symptoms persists.  Follow-up with persistent all worsening symptoms or developing other neurological symptoms.  The nature of vertigo syndromes were discussed along with their usual course and treatment.  Educational materials given and questions answered.  The risks and benefits of my recommendations, as well as other treatment options were discussed with the patient today.

## 2023-08-02 NOTE — PROGRESS NOTES
Chief Complaint   Patient presents with    Vertigo       Vertigo - Dizziness  Patient presents with dizziness. The dizziness has been present for several months. The patient describes the symptoms as disequalibirum and vertigo. Symptoms are exacerbated by tilting head back, and then looking straight ahead,none identified The patient also complains of none. Patient denies aural pressure, otalgia, and hearing loss. He has been treated with meclizine (Antivert) with poor improvement. Took meclizine when he went to the ER for this. Took for 10 days.    History reviewed. No pertinent past medical history.  Patient Active Problem List    Diagnosis Date Noted    Benign paroxysmal positional vertigo 08/02/2023    Second degree AV block 03/10/2023    Acquired hypothyroidism 03/07/2023    Acute bilateral thoracic back pain 03/07/2023    Asymmetrical hearing loss 03/07/2023    Bilateral edema of lower extremity 03/07/2023    BPH (benign prostatic hyperplasia) 03/07/2023    Cellulitis of right abdominal wall 03/07/2023    Cervical spondylosis with myelopathy 03/07/2023    Chest pain 03/07/2023    Contact dermatitis 03/07/2023    Coronary artery disease involving native coronary artery of native heart without angina pectoris 03/07/2023    Dermatitis 03/07/2023    Dysphagia 03/07/2023    Elevated PSA 03/07/2023    Essential hypertension 03/07/2023    Eye problem 03/07/2023    Eye, ear, nose, and throat symptom 03/07/2023    Generalized arthritis 03/07/2023    Headache 03/07/2023    Heart disease 03/07/2023    High frequency sensorineural hearing loss of left ear 03/07/2023    Male erectile dysfunction, unspecified 03/07/2023    Medication side effects 03/07/2023    Mixed hyperlipidemia 03/07/2023    Myelopathy concurrent with and due to spinal stenosis of cervical region (CMS/HCC) 03/07/2023    Neuropathy, cervical (radicular) 03/07/2023    Non-recurrent bilateral inguinal hernia without obstruction or gangrene 03/07/2023     Other chest pain 03/07/2023    Paresthesia of upper and lower extremities of both sides 03/07/2023    Prostate cancer (CMS/HCC) 03/07/2023    Rotator cuff arthropathy of left shoulder 03/07/2023    Shortness of breath 03/07/2023    Snoring 03/07/2023    Spondylosis of cervical spine without myelopathy 03/07/2023    Tinnitus, bilateral 03/07/2023    Varicose veins of both legs with edema 03/07/2023     Past Surgical History:   Procedure Laterality Date    OTHER SURGICAL HISTORY  08/06/2019    Bypass    OTHER SURGICAL HISTORY  05/27/2020    Hernia repair    OTHER SURGICAL HISTORY  05/27/2020    Vasectomy    OTHER SURGICAL HISTORY  04/16/2020    Colonoscopy     Current Outpatient Medications   Medication Sig Dispense Refill    aspirin 81 mg EC tablet Take 1 tablet (81 mg) by mouth 2 times a day.      atorvastatin (Lipitor) 80 mg tablet Take 1 tablet (80 mg) by mouth once daily.      levothyroxine (Synthroid) 112 mcg tablet Take 1 tablet (112 mcg) by mouth once daily in the morning. Take before meals.      lisinopril 20 mg tablet Take 1 tablet (20 mg) by mouth once daily.      loratadine (Claritin) 10 mg tablet Take 1 tablet (10 mg) by mouth once daily.      metoprolol succinate XL (Toprol-XL) 25 mg 24 hr tablet Take 1 tablet (25 mg) by mouth once daily.      nitroglycerin (Nitrostat) 0.4 mg SL tablet Place 1 tablet (0.4 mg) under the tongue if needed for chest pain. DISSOLVE 1 TABLET UNDER THE TONGUE AS NEEDED FOR CHEST PAIN. IF NO PAIN RELIEF CALL 911.      tadalafil (Cialis) 20 mg tablet Take by mouth see administration instructions.      triamcinolone acetonide 0.025 % lotion see administration instructions.       No current facility-administered medications for this visit.     Current Outpatient Medications on File Prior to Visit   Medication Sig Dispense Refill    aspirin 81 mg EC tablet Take 1 tablet (81 mg) by mouth 2 times a day.      atorvastatin (Lipitor) 80 mg tablet Take 1 tablet (80 mg) by mouth once  daily.      levothyroxine (Synthroid) 112 mcg tablet Take 1 tablet (112 mcg) by mouth once daily in the morning. Take before meals.      lisinopril 20 mg tablet Take 1 tablet (20 mg) by mouth once daily.      loratadine (Claritin) 10 mg tablet Take 1 tablet (10 mg) by mouth once daily.      metoprolol succinate XL (Toprol-XL) 25 mg 24 hr tablet Take 1 tablet (25 mg) by mouth once daily.      nitroglycerin (Nitrostat) 0.4 mg SL tablet Place 1 tablet (0.4 mg) under the tongue if needed for chest pain. DISSOLVE 1 TABLET UNDER THE TONGUE AS NEEDED FOR CHEST PAIN. IF NO PAIN RELIEF CALL 911.      tadalafil (Cialis) 20 mg tablet Take by mouth see administration instructions.      triamcinolone acetonide 0.025 % lotion see administration instructions.      [DISCONTINUED] metoprolol succinate XL (Toprol-XL) 50 mg 24 hr tablet Take 1 tablet (50 mg) by mouth once daily.      [DISCONTINUED] metoprolol tartrate (Lopressor) 25 mg tablet Take 1 tablet (25 mg) by mouth once daily.       No current facility-administered medications on file prior to visit.     Allergies   Allergen Reactions    Vancomycin Unknown     Review of Systems - General ROS: negative for - fatigue, fever, malaise, night sweats, sleep disturbance or weight loss  ENT ROS: negative for - hearing change, nasal discharge, oral lesions, sinus pain, sore throat, tinnitus or vertigo  Endocrine ROS: negative for - hot flashes, malaise/lethargy, palpitations, polydipsia/polyuria'  Respiratory ROS: negative for - cough, hemoptysis, pleuritic pain, shortness of breath or wheezing  Cardiovascular ROS: no chest pain or dyspnea on exertion  Gastrointestinal ROS: no abdominal pain, change in bowel habits, or black or bloody stools  Genito-Urinary ROS: no dysuria, trouble voiding, or hematuria  Neurological ROS: negative for - dizziness, gait disturbance, headaches, impaired coordination/balance,   No numbness/tingling, tremors or visual changes    Blood pressure 114/80,  "pulse 74, temperature 36.3 °C (97.4 °F), resp. rate 22, height 1.753 m (5' 9\"), weight 83.6 kg (184 lb 6.4 oz), SpO2 96 %.    Physical Examination: General appearance - alert, well appearing, and in no distress  Mental status - alert, oriented to person, place, and time  Eyes - pupils equal and reactive, extraocular eye movements intact  Ears - bilateral TM's and external ear canals normal  Mouth - mucous membranes moist, pharynx normal without lesions  Neck - supple, no significant adenopathy  Chest - clear to auscultation, no wheezes, rales or rhonchi, symmetric air entry  Heart - normal rate, regular rhythm, normal S1, S2, no murmurs, rubs, clicks or gallops  Abdomen - soft, nontender, nondistended, no masses or organomegaly  Extremities -no cyanosis clubbing or edema  Neurological - alert, oriented, normal speech, no focal findings or movement disorder noted    Problem List Items Addressed This Visit       Essential hypertension    Benign paroxysmal positional vertigo - Primary    Current Assessment & Plan       The nature of vertigo was discussed.  Educational material provided.  Patient was advised to take meclizine as needed. He was advised not to drive or operate machinery during the episodes or if the symptoms persists.  Follow-up with persistent all worsening symptoms or developing other neurological symptoms.  The nature of vertigo syndromes were discussed along with their usual course and treatment.  Educational materials given and questions answered.  The risks and benefits of my recommendations, as well as other treatment options were discussed with the patient today.              Scribe Attestation  By signing my name below, I, Spike Haley   attest that this documentation has been prepared under the direction and in the presence of Shadi Hazel MD.    "

## 2023-08-06 ENCOUNTER — PATIENT MESSAGE (OUTPATIENT)
Dept: PRIMARY CARE | Facility: CLINIC | Age: 73
End: 2023-08-06
Payer: MEDICARE

## 2023-08-23 DIAGNOSIS — E03.9 ACQUIRED HYPOTHYROIDISM: ICD-10-CM

## 2023-08-25 ENCOUNTER — LAB (OUTPATIENT)
Dept: LAB | Facility: LAB | Age: 73
End: 2023-08-25
Payer: MEDICARE

## 2023-08-25 DIAGNOSIS — E03.9 ACQUIRED HYPOTHYROIDISM: ICD-10-CM

## 2023-08-25 LAB
THYROTROPIN (MIU/L) IN SER/PLAS BY DETECTION LIMIT <= 0.05 MIU/L: 0.19 MIU/L (ref 0.44–3.98)
THYROXINE (T4) FREE (NG/DL) IN SER/PLAS: 1.21 NG/DL (ref 0.61–1.12)

## 2023-08-25 PROCEDURE — 84439 ASSAY OF FREE THYROXINE: CPT

## 2023-08-25 PROCEDURE — 84443 ASSAY THYROID STIM HORMONE: CPT

## 2023-08-25 PROCEDURE — 36415 COLL VENOUS BLD VENIPUNCTURE: CPT

## 2023-08-27 DIAGNOSIS — E03.9 ACQUIRED HYPOTHYROIDISM: Primary | ICD-10-CM

## 2023-08-27 RX ORDER — LEVOTHYROXINE SODIUM 100 UG/1
100 TABLET ORAL DAILY
Qty: 90 TABLET | Refills: 1 | Status: SHIPPED | OUTPATIENT
Start: 2023-08-27 | End: 2024-02-25

## 2023-08-28 DIAGNOSIS — E03.9 ACQUIRED HYPOTHYROIDISM: Primary | ICD-10-CM

## 2023-11-02 ENCOUNTER — PATIENT OUTREACH (OUTPATIENT)
Dept: PRIMARY CARE | Facility: CLINIC | Age: 73
End: 2023-11-02
Payer: MEDICARE

## 2023-11-02 ENCOUNTER — OFFICE VISIT (OUTPATIENT)
Dept: UROLOGY | Facility: CLINIC | Age: 73
End: 2023-11-02
Payer: MEDICARE

## 2023-11-02 ENCOUNTER — TELEPHONE (OUTPATIENT)
Dept: PRIMARY CARE | Facility: CLINIC | Age: 73
End: 2023-11-02

## 2023-11-02 VITALS
RESPIRATION RATE: 16 BRPM | HEIGHT: 69 IN | WEIGHT: 183 LBS | BODY MASS INDEX: 27.11 KG/M2 | HEART RATE: 76 BPM | SYSTOLIC BLOOD PRESSURE: 105 MMHG | DIASTOLIC BLOOD PRESSURE: 67 MMHG

## 2023-11-02 DIAGNOSIS — C61 PROSTATE CANCER (MULTI): Primary | ICD-10-CM

## 2023-11-02 DIAGNOSIS — R35.1 BENIGN PROSTATIC HYPERPLASIA WITH NOCTURIA: ICD-10-CM

## 2023-11-02 DIAGNOSIS — N40.1 BENIGN PROSTATIC HYPERPLASIA WITH NOCTURIA: ICD-10-CM

## 2023-11-02 PROCEDURE — 1160F RVW MEDS BY RX/DR IN RCRD: CPT | Performed by: UROLOGY

## 2023-11-02 PROCEDURE — 99214 OFFICE O/P EST MOD 30 MIN: CPT | Performed by: UROLOGY

## 2023-11-02 PROCEDURE — 1126F AMNT PAIN NOTED NONE PRSNT: CPT | Performed by: UROLOGY

## 2023-11-02 PROCEDURE — 1036F TOBACCO NON-USER: CPT | Performed by: UROLOGY

## 2023-11-02 PROCEDURE — 3074F SYST BP LT 130 MM HG: CPT | Performed by: UROLOGY

## 2023-11-02 PROCEDURE — 3078F DIAST BP <80 MM HG: CPT | Performed by: UROLOGY

## 2023-11-02 PROCEDURE — 1159F MED LIST DOCD IN RCRD: CPT | Performed by: UROLOGY

## 2023-11-02 RX ORDER — ACETAMINOPHEN 325 MG/1
975 TABLET ORAL ONCE
Status: CANCELLED | OUTPATIENT
Start: 2023-11-02 | End: 2023-11-02

## 2023-11-02 RX ORDER — SODIUM CHLORIDE 9 MG/ML
100 INJECTION, SOLUTION INTRAVENOUS CONTINUOUS
Status: CANCELLED | OUTPATIENT
Start: 2023-11-02

## 2023-11-02 ASSESSMENT — PAIN SCALES - GENERAL: PAINLEVEL: 0-NO PAIN

## 2023-11-02 NOTE — TELEPHONE ENCOUNTER
Bridgette Edgar LPN  [id number] [employer]  Discharge Facility: [facility]  Discharge Diagnosis: chest pain  Admission Date: 10/31/23  Discharge Date: 11/1/23   No PCP appointments available within 14 days of discharge.  Please reach out to patient and schedule an appointment within 7-13 days from discharge date.      Please help with scheduling

## 2023-11-02 NOTE — PROGRESS NOTES
History Of Present Illness  73-year-old male here to see me regarding prostate cancer  PMH: Hypertension, hyperlipidemia, AV block, BPH, CABG 5y ago  PSH: hiatal hernia, inguinal hernias  FH: 2 brothers with prostate cancer, father with prostate cancer, all of them were treated  On nitro prn  Previously followed with my colleague, Dr. Espinosa.  Last seen 07/2023.  Patient is unable to get an MRI due to abandoned pacer leads.    -09/2022: Initial diagnosis, low risk prostate cancer, [place] 3+3 in right apex, left apex, left mid  -12/22: 7.75  -04/23: PSA 12.76  -07/23: 9.6    Admitted 11/1/23 w/ chest pain, work-up was negative.     Endorses: no daytime frequency, occasional urge, mildly weak stream  Denies: hesitancy, hematuria, leakage, post-void dribble  Also, NTF 3-4x worse in recent months.     Past Medical History  He has no past medical history on file.    Surgical History  He has a past surgical history that includes Other surgical history (08/06/2019); Other surgical history (05/27/2020); Other surgical history (05/27/2020); Other surgical history (04/16/2020); CT angio neck w and wo IV contrast (6/17/2023); and CT angio head w and wo IV contrast (6/17/2023).     Social History  He reports that he has never smoked. He has never used smokeless tobacco. He reports current alcohol use. He reports that he does not use drugs.    Family History  Family History   Problem Relation Name Age of Onset    Hypertension Mother      Hyperlipidemia Father      Hypothyroidism Father      Hyperlipidemia Other sibling     Hypothyroidism Other sibling         Allergies  Vancomycin    ROS: 12 system review was completed and is negative with the exception of those signs and symptoms noted in the history of present illness: A 12 system review was completed and is negative with the exception of those signs and symptoms noted in the history of present illness.     Exam:  General: in NAD, appears stated age  Head: normocephalic,  "atraumatic  Respiratory: normal effort, no use of accessory muscles  Cardiovascular: no edema noted  Skin: normal turgor, no rashes  Neurologic: grossly intact, oriented to person/place/time  Psychiatric: mode and affect appropriate     Last Recorded Vitals  Blood pressure 105/67, pulse 76, resp. rate 16, height 1.753 m (5' 9\"), weight 83 kg (183 lb).    Lab Results   Component Value Date    PSASCREEN CANCELED 07/17/2023    CREATININE 0.90 07/31/2023         ASSESSMENT/PLAN:  #Low risk prostate cancer  -Some PSA fluctuation, and 1 value above 10 which would make him intermediate risk, but this decreased shortly thereafter back below 10  -I recommended repeat prostate biopsy as part of active surveillance protocol  -He is looking into whether his retained lead is MRI safe, plan for template biopsy    #Nocturia  -Minimal daytime symptoms  -We discussed lifestyle modification as a means to prevent nocturia, including fluid restriction, double voiding prior to bed, avoiding alcohol    Remigio Ceballos MD    "

## 2023-11-02 NOTE — PROGRESS NOTES
Discharge Facility: [facility]  Discharge Diagnosis: chest pain  Admission Date: 10/31/23  Discharge Date: 11/1/23    PCP Appointment Date: TBD - task to office  Specialist Appointment Date:   - Cardiology: 11/16/23  Hospital Encounter and Summary: Linked   See discharge assessment below for further details    Medications  Medications reviewed with patient/caregiver?: Yes (see med list) (11/2/2023  9:41 AM)  Is the patient having any side effects they believe may be caused by any medication additions or changes?: No (11/2/2023  9:41 AM)  Does the patient have all medications ordered at discharge?: Yes (11/2/2023  9:41 AM)  Care Management Interventions: No intervention needed (11/2/2023  9:41 AM)  Prescription Comments: START: Ranexa 500mg BID STOP: nitroquick (11/2/2023  9:41 AM)  Is the patient taking all medications as directed (includes completed medication regime)?: Yes (11/2/2023  9:41 AM)  Care Management Interventions: Provided patient education (11/2/2023  9:41 AM)  Medication Comments: Patient reports the doctor at hospital told him to decrease lipitor to 40mg but his DC paperwork still says 80mg. States he placed a call to provider today to get clarification on that. (11/2/2023  9:41 AM)    Appointments  Does the patient have a primary care provider?: Yes (11/2/2023  9:41 AM)  Care Management Interventions: Advised patient to make appointment (11/2/2023  9:41 AM)    Self Management  Has home health visited the patient within 72 hours of discharge?: Not applicable (11/2/2023  9:41 AM)  What Durable Medical Equipment (DME) was ordered?: n/a (11/2/2023  9:41 AM)    Patient Teaching  Does the patient have access to their discharge instructions?: Yes (11/2/2023  9:41 AM)  Care Management Interventions: Reviewed instructions with patient (11/2/2023  9:41 AM)  What is the patient's perception of their health status since discharge?: Improving (11/2/2023  9:41 AM)  Is the patient/caregiver able to teach back the  hierarchy of who to call/visit for symptoms/problems? PCP, Specialist, Home Health nurse, Urgent Care, ED, 911: Yes (11/2/2023  9:41 AM)

## 2023-11-07 ENCOUNTER — OFFICE VISIT (OUTPATIENT)
Dept: PRIMARY CARE | Facility: CLINIC | Age: 73
End: 2023-11-07
Payer: MEDICARE

## 2023-11-07 VITALS
RESPIRATION RATE: 14 BRPM | BODY MASS INDEX: 27.4 KG/M2 | HEIGHT: 69 IN | WEIGHT: 185 LBS | OXYGEN SATURATION: 94 % | TEMPERATURE: 97.8 F | DIASTOLIC BLOOD PRESSURE: 68 MMHG | HEART RATE: 73 BPM | SYSTOLIC BLOOD PRESSURE: 110 MMHG

## 2023-11-07 DIAGNOSIS — I10 ESSENTIAL HYPERTENSION: ICD-10-CM

## 2023-11-07 DIAGNOSIS — R07.89 OTHER CHEST PAIN: ICD-10-CM

## 2023-11-07 DIAGNOSIS — I25.10 CORONARY ARTERY DISEASE INVOLVING NATIVE CORONARY ARTERY OF NATIVE HEART WITHOUT ANGINA PECTORIS: ICD-10-CM

## 2023-11-07 DIAGNOSIS — Z09 HOSPITAL DISCHARGE FOLLOW-UP: Primary | ICD-10-CM

## 2023-11-07 PROCEDURE — 3074F SYST BP LT 130 MM HG: CPT | Performed by: FAMILY MEDICINE

## 2023-11-07 PROCEDURE — 3078F DIAST BP <80 MM HG: CPT | Performed by: FAMILY MEDICINE

## 2023-11-07 PROCEDURE — 1126F AMNT PAIN NOTED NONE PRSNT: CPT | Performed by: FAMILY MEDICINE

## 2023-11-07 PROCEDURE — 1159F MED LIST DOCD IN RCRD: CPT | Performed by: FAMILY MEDICINE

## 2023-11-07 PROCEDURE — 99495 TRANSJ CARE MGMT MOD F2F 14D: CPT | Performed by: FAMILY MEDICINE

## 2023-11-07 PROCEDURE — 1036F TOBACCO NON-USER: CPT | Performed by: FAMILY MEDICINE

## 2023-11-07 PROCEDURE — 1160F RVW MEDS BY RX/DR IN RCRD: CPT | Performed by: FAMILY MEDICINE

## 2023-11-07 RX ORDER — RANOLAZINE 500 MG/1
500 TABLET, EXTENDED RELEASE ORAL 2 TIMES DAILY
COMMUNITY
Start: 2023-11-01

## 2023-11-07 ASSESSMENT — ENCOUNTER SYMPTOMS: FATIGUE: 1

## 2023-11-07 NOTE — PROGRESS NOTES
"Subjective   Patient ID: Mao Ordoñez is a 73 y.o. male who presents for Hospital Follow-up (10/31-11/1/23 for Chest Pain).    He presents today for follow-up after being discharged from the hospital 6 days ago. The main problem requiring admission was Chest Pain. The discharge summary and/or Transitional Care Management documentation was reviewed. Medication reconciliation was performed as indicated via the \"Marquis as Reviewed\" timestamp.     Mao Ordoñez was contacted by Transitional Care Management services two days after his discharge. This encounter and supporting documentation was reviewed.    He presents for follow up on chest pain. Onset was 3 weeks ago. Symptoms have been stable since that time. The patient's pain is associated with activities. The patient describes the pain as heaviness, pressure and does not radiate. Patient rates pain as a 3/10 in intensity. Associated symptoms are: chest pain, chest pressure/discomfort, and fatigue.     The complexity of medical decision making for this patient's transitional care is moderate.       Review of Systems   Constitutional:  Positive for fatigue. Negative for chills and fever.   HENT:  Negative for congestion, ear pain, nosebleeds, rhinorrhea and sore throat.    Respiratory:  Negative for cough, shortness of breath and wheezing.    Cardiovascular:  Positive for chest pain. Negative for palpitations and leg swelling.   Gastrointestinal:  Negative for abdominal pain, constipation, diarrhea and vomiting.   Genitourinary:  Negative for dysuria, frequency and hematuria.   Neurological:  Negative for dizziness, tremors, numbness and headaches.       Objective   /68   Pulse 73   Temp 36.6 °C (97.8 °F)   Resp 14   Ht 1.753 m (5' 9\")   Wt 83.9 kg (185 lb)   SpO2 94%   BMI 27.32 kg/m²     Physical Exam  Constitutional:       General: He is not in acute distress.     Appearance: Normal appearance.   HENT:      Head: Normocephalic and atraumatic.      " "Mouth/Throat:      Mouth: Mucous membranes are moist.      Pharynx: Oropharynx is clear. No oropharyngeal exudate or posterior oropharyngeal erythema.   Eyes:      General: No scleral icterus.     Extraocular Movements: Extraocular movements intact.      Pupils: Pupils are equal, round, and reactive to light.   Cardiovascular:      Rate and Rhythm: Normal rate and regular rhythm.      Pulses: Normal pulses.      Heart sounds: No murmur heard.     No friction rub. No gallop.   Pulmonary:      Effort: Pulmonary effort is normal.      Breath sounds: No wheezing, rhonchi or rales.   Skin:     General: Skin is warm.      Coloration: Skin is not jaundiced or pale.   Neurological:      General: No focal deficit present.      Mental Status: He is alert and oriented to person, place, and time.         Assessment/Plan   Problem List Items Addressed This Visit       Coronary artery disease involving native coronary artery of native heart without angina pectoris     Improving based on symptoms and exam.  Continue established treatment plan and follow-up at least yearly.           Relevant Medications    ranolazine (Ranexa) 500 mg 12 hr tablet    Essential hypertension     Dietary sodium restriction.  Regular aerobic exercise program is recommended to help achieve and maintain normal body weight, fitness and improve lipid balance. .  Dietary changes: Increase soluble fiber  Plant sterols 2grams per day (e.g. Benecol)  Reduce saturated fat, \"trans\" monounsaturated fatty acids, and cholesterol          Other chest pain     We discussed the nature of the chest pain. Questions answered.  Follow-up if persistent or worsening symptoms otherwise when necessary.            Other Visit Diagnoses       Hospital discharge follow-up    -  Primary            Medications  Medications reviewed with patient/caregiver?: Yes (see med list) (11/2/2023  9:41 AM)  Is the patient having any side effects they believe may be caused by any medication " additions or changes?: No (11/2/2023  9:41 AM)  Does the patient have all medications ordered at discharge?: Yes (11/2/2023  9:41 AM)  Care Management Interventions: No intervention needed (11/2/2023  9:41 AM)  Prescription Comments: START: Ranexa 500mg BID STOP: nitroquick (11/2/2023  9:41 AM)  Is the patient taking all medications as directed (includes completed medication regime)?: Yes (11/2/2023  9:41 AM)  Care Management Interventions: Provided patient education (11/2/2023  9:41 AM)  Medication Comments: Patient reports the doctor at hospital told him to decrease lipitor to 40mg but his DC paperwork still says 80mg. States he placed a call to provider today to get clarification on that. (11/2/2023  9:41 AM)    Appointments  Does the patient have a primary care provider?: Yes (11/2/2023  9:41 AM)  Care Management Interventions: Advised patient to make appointment (11/2/2023  9:41 AM)    Self Management  Has home health visited the patient within 72 hours of discharge?: Not applicable (11/2/2023  9:41 AM)  What Durable Medical Equipment (DME) was ordered?: n/a (11/2/2023  9:41 AM)    Patient Teaching  Does the patient have access to their discharge instructions?: Yes (11/2/2023  9:41 AM)  Care Management Interventions: Reviewed instructions with patient (11/2/2023  9:41 AM)  What is the patient's perception of their health status since discharge?: Improving (11/2/2023  9:41 AM)  Is the patient/caregiver able to teach back the hierarchy of who to call/visit for symptoms/problems? PCP, Specialist, Home Health nurse, Urgent Care, ED, 911: Yes (11/2/2023  9:41 AM)    Scribe Attestation  By signing my name below, IAlbert, Scribharsha   attest that this documentation has been prepared under the direction and in the presence of Shadi Hazel MD.

## 2023-11-07 NOTE — ASSESSMENT & PLAN NOTE
We discussed the nature of the chest pain. Questions answered.  Follow-up if persistent or worsening symptoms otherwise when necessary.

## 2023-11-08 ASSESSMENT — ENCOUNTER SYMPTOMS
FEVER: 0
HEADACHES: 0
WHEEZING: 0
CHILLS: 0
SHORTNESS OF BREATH: 0
RHINORRHEA: 0
PALPITATIONS: 0
DYSURIA: 0
COUGH: 0
DIARRHEA: 0
CONSTIPATION: 0
SORE THROAT: 0
HEMATURIA: 0
ABDOMINAL PAIN: 0
FREQUENCY: 0
VOMITING: 0
DIZZINESS: 0
NUMBNESS: 0
TREMORS: 0

## 2023-11-08 NOTE — ASSESSMENT & PLAN NOTE
Improving based on symptoms and exam.  Continue established treatment plan and follow-up at least yearly.

## 2023-11-08 NOTE — ASSESSMENT & PLAN NOTE
"Dietary sodium restriction.  Regular aerobic exercise program is recommended to help achieve and maintain normal body weight, fitness and improve lipid balance. .  Dietary changes: Increase soluble fiber  Plant sterols 2grams per day (e.g. Benecol)  Reduce saturated fat, \"trans\" monounsaturated fatty acids, and cholesterol   "

## 2023-11-17 ENCOUNTER — PATIENT OUTREACH (OUTPATIENT)
Dept: PRIMARY CARE | Facility: CLINIC | Age: 73
End: 2023-11-17
Payer: MEDICARE

## 2023-11-17 NOTE — PROGRESS NOTES
Unable to reach patient for call back after patient's follow up appointment with PCP.   BERTAM with call back number for patient to call if needed   If no voicemail available call attempts x 2 were made to contact the patient to assist with any questions or concerns patient may have.

## 2023-11-20 ENCOUNTER — LAB (OUTPATIENT)
Dept: LAB | Facility: LAB | Age: 73
End: 2023-11-20
Payer: MEDICARE

## 2023-11-20 DIAGNOSIS — E03.9 ACQUIRED HYPOTHYROIDISM: ICD-10-CM

## 2023-11-20 LAB
T4 FREE SERPL-MCNC: 1.13 NG/DL (ref 0.61–1.12)
TSH SERPL-ACNC: 2.17 MIU/L (ref 0.44–3.98)

## 2023-11-20 PROCEDURE — 84443 ASSAY THYROID STIM HORMONE: CPT

## 2023-11-20 PROCEDURE — 84439 ASSAY OF FREE THYROXINE: CPT

## 2023-11-20 PROCEDURE — 36415 COLL VENOUS BLD VENIPUNCTURE: CPT

## 2023-12-27 ENCOUNTER — PATIENT OUTREACH (OUTPATIENT)
Dept: PRIMARY CARE | Facility: CLINIC | Age: 73
End: 2023-12-27
Payer: MEDICARE

## 2024-01-30 RX ORDER — EZETIMIBE 10 MG/1
10 TABLET ORAL DAILY
COMMUNITY

## 2024-02-13 ENCOUNTER — ANESTHESIA EVENT (OUTPATIENT)
Dept: OPERATING ROOM | Facility: CLINIC | Age: 74
End: 2024-02-13
Payer: MEDICARE

## 2024-02-13 ENCOUNTER — ANESTHESIA (OUTPATIENT)
Dept: OPERATING ROOM | Facility: CLINIC | Age: 74
End: 2024-02-13
Payer: MEDICARE

## 2024-02-13 ENCOUNTER — HOSPITAL ENCOUNTER (OUTPATIENT)
Facility: CLINIC | Age: 74
Setting detail: OUTPATIENT SURGERY
Discharge: HOME | End: 2024-02-13
Attending: UROLOGY | Admitting: UROLOGY
Payer: MEDICARE

## 2024-02-13 VITALS
HEART RATE: 62 BPM | HEIGHT: 68 IN | TEMPERATURE: 96.8 F | DIASTOLIC BLOOD PRESSURE: 85 MMHG | OXYGEN SATURATION: 97 % | SYSTOLIC BLOOD PRESSURE: 132 MMHG | RESPIRATION RATE: 18 BRPM | WEIGHT: 183.2 LBS | BODY MASS INDEX: 27.77 KG/M2

## 2024-02-13 DIAGNOSIS — C61 PROSTATE CANCER (MULTI): Primary | ICD-10-CM

## 2024-02-13 PROCEDURE — 88344 IMHCHEM/IMCYTCHM EA MLT ANTB: CPT | Mod: TC,SUR,ELYLAB | Performed by: UROLOGY

## 2024-02-13 PROCEDURE — 88344 IMHCHEM/IMCYTCHM EA MLT ANTB: CPT | Performed by: PATHOLOGY

## 2024-02-13 PROCEDURE — A55700 PR BIOPSY OF PROSTATE,NEEDLE/PUNCH: Performed by: ANESTHESIOLOGY

## 2024-02-13 PROCEDURE — 7100000002 HC RECOVERY ROOM TIME - EACH INCREMENTAL 1 MINUTE: Performed by: UROLOGY

## 2024-02-13 PROCEDURE — 3700000002 HC GENERAL ANESTHESIA TIME - EACH INCREMENTAL 1 MINUTE: Performed by: UROLOGY

## 2024-02-13 PROCEDURE — 7100000001 HC RECOVERY ROOM TIME - INITIAL BASE CHARGE: Performed by: UROLOGY

## 2024-02-13 PROCEDURE — 2500000004 HC RX 250 GENERAL PHARMACY W/ HCPCS (ALT 636 FOR OP/ED): Performed by: UROLOGY

## 2024-02-13 PROCEDURE — 7100000010 HC PHASE TWO TIME - EACH INCREMENTAL 1 MINUTE: Performed by: UROLOGY

## 2024-02-13 PROCEDURE — 88305 TISSUE EXAM BY PATHOLOGIST: CPT | Mod: TC,SUR,ELYLAB | Performed by: UROLOGY

## 2024-02-13 PROCEDURE — A55700 PR BIOPSY OF PROSTATE,NEEDLE/PUNCH

## 2024-02-13 PROCEDURE — 76942 ECHO GUIDE FOR BIOPSY: CPT | Performed by: UROLOGY

## 2024-02-13 PROCEDURE — 99100 ANES PT EXTEME AGE<1 YR&>70: CPT | Performed by: ANESTHESIOLOGY

## 2024-02-13 PROCEDURE — 2500000005 HC RX 250 GENERAL PHARMACY W/O HCPCS: Performed by: UROLOGY

## 2024-02-13 PROCEDURE — G0416 PROSTATE BIOPSY, ANY MTHD: HCPCS | Performed by: PATHOLOGY

## 2024-02-13 PROCEDURE — 3700000001 HC GENERAL ANESTHESIA TIME - INITIAL BASE CHARGE: Performed by: UROLOGY

## 2024-02-13 PROCEDURE — 7100000009 HC PHASE TWO TIME - INITIAL BASE CHARGE: Performed by: UROLOGY

## 2024-02-13 PROCEDURE — 55700 PR PROSTATE NEEDLE BIOPSY ANY APPROACH: CPT | Performed by: UROLOGY

## 2024-02-13 PROCEDURE — A4217 STERILE WATER/SALINE, 500 ML: HCPCS | Performed by: UROLOGY

## 2024-02-13 PROCEDURE — 3600000007 HC OR TIME - EACH INCREMENTAL 1 MINUTE - PROCEDURE LEVEL TWO: Performed by: UROLOGY

## 2024-02-13 PROCEDURE — 3600000002 HC OR TIME - INITIAL BASE CHARGE - PROCEDURE LEVEL TWO: Performed by: UROLOGY

## 2024-02-13 PROCEDURE — 2500000004 HC RX 250 GENERAL PHARMACY W/ HCPCS (ALT 636 FOR OP/ED)

## 2024-02-13 RX ORDER — ONDANSETRON HYDROCHLORIDE 2 MG/ML
4 INJECTION, SOLUTION INTRAVENOUS ONCE AS NEEDED
Status: CANCELLED | OUTPATIENT
Start: 2024-02-13

## 2024-02-13 RX ORDER — CEFAZOLIN SODIUM 2 G/100ML
2 INJECTION, SOLUTION INTRAVENOUS ONCE
Status: DISCONTINUED | OUTPATIENT
Start: 2024-02-13 | End: 2024-02-13 | Stop reason: HOSPADM

## 2024-02-13 RX ORDER — PROPOFOL 10 MG/ML
INJECTION, EMULSION INTRAVENOUS AS NEEDED
Status: DISCONTINUED | OUTPATIENT
Start: 2024-02-13 | End: 2024-02-13

## 2024-02-13 RX ORDER — LIDOCAINE IN NACL,ISO-OSMOT/PF 30 MG/3 ML
0.1 SYRINGE (ML) INJECTION ONCE
Status: CANCELLED | OUTPATIENT
Start: 2024-02-13 | End: 2024-02-13

## 2024-02-13 RX ORDER — PROPOFOL 10 MG/ML
INJECTION, EMULSION INTRAVENOUS CONTINUOUS PRN
Status: DISCONTINUED | OUTPATIENT
Start: 2024-02-13 | End: 2024-02-13

## 2024-02-13 RX ORDER — SODIUM CHLORIDE, SODIUM LACTATE, POTASSIUM CHLORIDE, CALCIUM CHLORIDE 600; 310; 30; 20 MG/100ML; MG/100ML; MG/100ML; MG/100ML
INJECTION, SOLUTION INTRAVENOUS CONTINUOUS PRN
Status: DISCONTINUED | OUTPATIENT
Start: 2024-02-13 | End: 2024-02-13

## 2024-02-13 RX ORDER — SODIUM CHLORIDE 0.9 G/100ML
IRRIGANT IRRIGATION AS NEEDED
Status: DISCONTINUED | OUTPATIENT
Start: 2024-02-13 | End: 2024-02-13 | Stop reason: HOSPADM

## 2024-02-13 RX ORDER — BUPIVACAINE HYDROCHLORIDE 7.5 MG/ML
INJECTION, SOLUTION EPIDURAL; RETROBULBAR AS NEEDED
Status: DISCONTINUED | OUTPATIENT
Start: 2024-02-13 | End: 2024-02-13 | Stop reason: HOSPADM

## 2024-02-13 RX ORDER — ACETAMINOPHEN 325 MG/1
TABLET ORAL AS NEEDED
Status: DISCONTINUED | OUTPATIENT
Start: 2024-02-13 | End: 2024-02-13

## 2024-02-13 RX ORDER — CEFAZOLIN 1 G/1
INJECTION, POWDER, FOR SOLUTION INTRAVENOUS AS NEEDED
Status: DISCONTINUED | OUTPATIENT
Start: 2024-02-13 | End: 2024-02-13

## 2024-02-13 RX ORDER — SODIUM CHLORIDE, SODIUM LACTATE, POTASSIUM CHLORIDE, CALCIUM CHLORIDE 600; 310; 30; 20 MG/100ML; MG/100ML; MG/100ML; MG/100ML
100 INJECTION, SOLUTION INTRAVENOUS CONTINUOUS
Status: CANCELLED | OUTPATIENT
Start: 2024-02-13

## 2024-02-13 RX ORDER — ACETAMINOPHEN 325 MG/1
975 TABLET ORAL ONCE
Status: DISCONTINUED | OUTPATIENT
Start: 2024-02-13 | End: 2024-02-13 | Stop reason: HOSPADM

## 2024-02-13 RX ORDER — SODIUM CHLORIDE 9 MG/ML
100 INJECTION, SOLUTION INTRAVENOUS CONTINUOUS
Status: DISCONTINUED | OUTPATIENT
Start: 2024-02-13 | End: 2024-02-13 | Stop reason: HOSPADM

## 2024-02-13 RX ADMIN — CEFAZOLIN 2 G: 1 INJECTION, POWDER, FOR SOLUTION INTRAMUSCULAR; INTRAVENOUS at 10:49

## 2024-02-13 RX ADMIN — ACETAMINOPHEN 975 MG: 325 TABLET ORAL at 10:14

## 2024-02-13 RX ADMIN — SODIUM CHLORIDE, SODIUM LACTATE, POTASSIUM CHLORIDE, AND CALCIUM CHLORIDE: .6; .31; .03; .02 INJECTION, SOLUTION INTRAVENOUS at 10:43

## 2024-02-13 RX ADMIN — PROPOFOL 40 MG: 10 INJECTION, EMULSION INTRAVENOUS at 10:48

## 2024-02-13 RX ADMIN — PROPOFOL 200 MCG/KG/MIN: 10 INJECTION, EMULSION INTRAVENOUS at 10:48

## 2024-02-13 SDOH — HEALTH STABILITY: MENTAL HEALTH: CURRENT SMOKER: 0

## 2024-02-13 ASSESSMENT — PAIN SCALES - GENERAL
PAINLEVEL_OUTOF10: 0 - NO PAIN

## 2024-02-13 ASSESSMENT — PAIN - FUNCTIONAL ASSESSMENT
PAIN_FUNCTIONAL_ASSESSMENT: 0-10

## 2024-02-13 ASSESSMENT — COLUMBIA-SUICIDE SEVERITY RATING SCALE - C-SSRS
2. HAVE YOU ACTUALLY HAD ANY THOUGHTS OF KILLING YOURSELF?: NO
1. IN THE PAST MONTH, HAVE YOU WISHED YOU WERE DEAD OR WISHED YOU COULD GO TO SLEEP AND NOT WAKE UP?: NO
6. HAVE YOU EVER DONE ANYTHING, STARTED TO DO ANYTHING, OR PREPARED TO DO ANYTHING TO END YOUR LIFE?: NO

## 2024-02-13 NOTE — ANESTHESIA POSTPROCEDURE EVALUATION
Patient: Mao Ordoñez    Procedure Summary       Date: 02/13/24 Room / Location: [facility] OR 03 / [facility]    Anesthesia Start: 1043 Anesthesia Stop: 1126    Procedure: Transperineal Biopsy Prostate (Perineum) Diagnosis:       Prostate cancer (CMS/HCC)      (Prostate cancer (CMS/HCC) [C61])    Surgeons: Remigio Ceballos MD Responsible Provider: Alexandre Vela MD    Anesthesia Type: MAC ASA Status: 3            Anesthesia Type: MAC    Vitals Value Taken Time   /75 02/13/24 1152   Temp 36 °C (96.8 °F) 02/13/24 1152   Pulse 65 02/13/24 1152   Resp 18 02/13/24 1152   SpO2 98 % 02/13/24 1152       Anesthesia Post Evaluation    Patient location during evaluation: bedside  Patient participation: complete - patient participated  Level of consciousness: awake  Pain management: adequate  Airway patency: patent  Cardiovascular status: acceptable  Respiratory status: acceptable  Hydration status: acceptable  Postoperative Nausea and Vomiting: none    No notable events documented.

## 2024-02-13 NOTE — OP NOTE
Transperineal Biopsy Prostate Operative Note     Date: 2024  [facility]    Name: Mao Ordoñez, : 1950, Age: 73 y.o., MRN: [id number], Sex: male    Diagnosis  Pre-op Diagnosis     * Prostate cancer (CMS/HCC) [C61] Post-op Diagnosis     * Prostate cancer (CMS/HCC) [C61]     Procedures  Transperineal Biopsy Prostate  39520 - TX PROSTATE NEEDLE BIOPSY ANY APPROACH      Surgeons      * Remigio Ceballos - Primary    Resident/Fellow/Other Assistant:  Surgeon(s) and Role:     * Kermit Conte MD - Resident - Assisting    Procedure Summary  Anesthesia: Monitor Anesthesia Care  ASA: III  Anesthesia Staff: Anesthesiologist: Alexandre Vela MD  C-AA: JEM Kasper  Estimated Blood Loss: 5mL  Intra-op Medications:   Administrations occurring from 1055 to 1140 on 24:   Medication Name Total Dose   sodium chloride 0.9 % irrigation solution 200 mL     Specimen:   ID Type Source Tests Collected by Time   1 : RIGHT POSTERIOR MEDIAL Tissue PROSTATE NEEDLE BIOPSY RIGHT SURGICAL PATHOLOGY EXAM Dustin Recinos RN 2024 1034   2 : RIGHT POSTERIOR LATERAL Tissue PROSTATE NEEDLE BIOPSY RIGHT SURGICAL PATHOLOGY EXAM Remigio Ceballos MD 2024 1034   3 : RIGHT BASE Tissue PROSTATE NEEDLE BIOPSY RIGHT SURGICAL PATHOLOGY EXAM Remigio Ceballos MD 2024 1034   4 : LEFT POSTERIOR MEDIAL Tissue PROSTATE NEEDLE BIOPSY LEFT SURGICAL PATHOLOGY EXAM Remigio Ceballos MD 2024 1034   5 : LEFT POSTERIOR LATERAL Tissue PROSTATE NEEDLE BIOPSY LEFT SURGICAL PATHOLOGY EXAM Dustin Recinos RN 2024 1034   6 : LEFT BASE Tissue PROSTATE NEEDLE BIOPSY LEFT SURGICAL PATHOLOGY EXAM Dustin Recinos RN 2024 1034   7 : RIGHT ANTERIOR Tissue PROSTATE NEEDLE BIOPSY RIGHT SURGICAL PATHOLOGY EXAM Dustin Recinos RN 2024 1034   8 : LEFT ANTERIOR Tissue PROSTATE NEEDLE BIOPSY LEFT SURGICAL PATHOLOGY EXAM Dustin Recinos RN 2024 1034        Staff:   Circulator: Dustin Recinos RN  Relief Scrub: Laura  HARIKA Ryan; Mandy Valenzuela  Scrub Person: Tamiko Rubio RN    Operative Indications: 73 year old male w/ dx of prostate cancer, low risk on active surveillance, who presents for transperineal biopsies. The patient was counseled regarding the risks, benefits, alternatives, equipment, and personnel involved and consented to proceed with surgical intervention.    Operative Findings: Uncomplicated transperineal biopsy  Prostate volume: 31g  PSA density:0.30    Operative Procedure:   The patient was correctly identified and the operative plan was confirmed with the patient and the operative team. A weight appropriate dose of prophylactic antibiotics was administered intravenously prior to the procedure and sequential compression devices were applied to the lower extremities and activated prior to induction of anesthesia. The patient was placed in supine position.  Monitored anesthesia was induced. The patient was repositioned in dorsal lithotomy position. All pressure points were padded per protocol.    A digital rectal exam revealed: A firm nodule at the left apex less than 50% of the prostate    The operative area was then prepped and draped in the usual sterile fashion.    The transrectal ultrasound probe was inserted into the rectum.  The perineal skin was infiltrated with 5 cc of 0.25% Marcaine plain on both sides of the perineal raphae.  A bilateral pudendal nerve block was performed using the precision point guide.  Using a 22-gauge, 7 inch spinal needle 10 cc of 0.25% Marcaine were infiltrated on either side of the pelvic musculature towards the lateral aspect of the prostate as well as the needle tracks bilaterally.  In total, 30 cc of Marcaine were used.    The prostate was then measured as noted in the operative findings section above and the volume was used to calculate the PSA density.  There were no visible hypoechoic lesions present.    An 18-gauge core biopsy needle was then used to obtain 2 biopsies  from each site: Posterior medial, posterior lateral, prostate base, anterior prostate bilaterally.  In total, 16 biopsy cores were taken.    Counts were correct. Sign-out was performed with the surgical team confirming the specimen listed below. The patient tolerated the procedure well, emerged from anesthesia without incident, and was transferred to PACU in stable condition.     NICHOLAS (Remigio Ceballos MD) performed the procedure    Remigio Ceballos  Phone Number: [phone]

## 2024-02-13 NOTE — ANESTHESIA PREPROCEDURE EVALUATION
Patient: Mao Ordoñez    Procedure Information       Date/Time: 02/13/24 1055    Procedure: Transperineal Biopsy Prostate    [facility]    Surgeons: Remigio Ceballos MD            Relevant Problems   Cardiovascular   (+) Acute bilateral thoracic back pain   (+) Chest pain   (+) Coronary artery disease involving native coronary artery of native heart without angina pectoris   (+) Essential hypertension   (+) Mixed hyperlipidemia   (+) Other chest pain   (+) Second degree AV block      Endocrine   (+) Acquired hypothyroidism      GI (within normal limits)      Neuro/Psych   (+) Neuropathy, cervical (radicular)      Pulmonary (within normal limits)      GI/Hepatic (within normal limits)      Hematology (within normal limits)      Musculoskeletal   (+) Cervical spondylosis with myelopathy   (+) Myelopathy concurrent with and due to spinal stenosis of cervical region (CMS/HCC)   (+) Spondylosis of cervical spine without myelopathy      Eyes, Ears, Nose, and Throat   (+) Asymmetrical hearing loss   (+) High frequency sensorineural hearing loss of left ear      Genitourinary   (+) Prostate cancer (CMS/HCC)      Other   (+) Generalized arthritis       Clinical information reviewed:   Tobacco  Allergies  Meds   Med Hx  Surg Hx   Fam Hx  Soc Hx        NPO Detail:  NPO/Void Status  Date of Last Liquid: 02/12/24  Time of Last Liquid: 2300  Date of Last Solid: 02/12/24  Time of Last Solid: 1900         Physical Exam    Airway  Mallampati: II  TM distance: >3 FB  Neck ROM: full     Cardiovascular   Rhythm: regular  Rate: normal     Dental    Pulmonary   Breath sounds clear to auscultation     Abdominal   (-) obese  Abdomen: soft             Anesthesia Plan    History of general anesthesia?: yes  History of complications of general anesthesia?: no    ASA 3     MAC     The patient is not a current smoker.  Patient was not previously instructed to abstain from smoking on day of  procedure.  Patient did not smoke on day of procedure.    intravenous induction   Anesthetic plan and risks discussed with patient and spouse.    Plan discussed with CAA and attending.

## 2024-02-13 NOTE — DISCHARGE INSTRUCTIONS
Post-Procedure Instructions for Transperineal Prostate Biopsy   Procedure Overview:   You have had a transperineal prostate biopsy.   We will call you to discuss your pathology results. Results typically take 7-10 business days. If you do not hear from us within 10 days, please reach out to us via the methods listed at the bottom of this handout.     What to Expect:   Blood-tinged urine with/without clots for 24-72 hours.   Blood in the ejaculate for 4-6 weeks.     When to Call:   Call your doctor immediately if you experience any of the following:    You are unable to urinate in 6-8 hours after the biopsy.    Fever above 101 degrees (F) or Chills    Passing excessive clots in urine or stool     Pain Control:   Tylenol should be adequate     Tylenol given at 10:15am Next dose 4:15pm    General Instructions:    Resume normal diet.    If you take Aspirin, resume 3 days after biopsy if you do not see blood in your urine.    Drink 6-8 glasses of water the rest of the day to dilute the urine and to prevent clot formation.    No alcoholic beverages for 24 hours after your biopsy.    No straining or heavy lifting for 5 days after your biopsy.     DR. MATA'S CONTACT INFORMATION   For non-urgent issues, please send our office a message via [employer], this is often easier and more convenient for you than calling the office. You should have received an invitation to sign-up for [employer] in your email upon registration.   For appointments: [phone], option 1 -> option 3 -> option 9     For questions/issues/concerns during business hours: [phone] - this number will direct you to the voicemail for Dr. Mata's , which is frequently checked during business hours.   F  or after-hours issues: [phone], this will direct you to our answering service or the resident physician on call if needed.   For medical emergencies, please call 911 or proceed to your nearest emergency room.

## 2024-02-13 NOTE — H&P
"History Of Present Illness  Mao Ordoñez is a 73 y.o. male presenting with [place] 3+3 prostate cancer on active surveillance. Here for repeat biopsy as part of active surveillance protocol. Unable to get MRI due to abandoned pacer leads.     Past Medical History  Past Medical History:   Diagnosis Date    Heart disease     Hyperlipidemia     Hypertension     Hypothyroidism        Surgical History  Past Surgical History:   Procedure Laterality Date    CORONARY ARTERY BYPASS GRAFT      5 vessels    CT ANGIO NECK  06/17/2023    CT NECK ANGIO W AND WO IV CONTRAST 6/17/2023    CT HEAD ANGIO W AND WO IV CONTRAST  06/17/2023    CT HEAD ANGIO W AND WO IV CONTRAST 6/17/2023    HERNIA REPAIR      OTHER SURGICAL HISTORY  08/06/2019    Bypass    OTHER SURGICAL HISTORY  05/27/2020    Hernia repair    OTHER SURGICAL HISTORY  05/27/2020    Vasectomy    OTHER SURGICAL HISTORY  04/16/2020    Colonoscopy        Social History  He reports that he has never smoked. He has never used smokeless tobacco. He reports current alcohol use. He reports that he does not use drugs.    Family History  Family History   Problem Relation Name Age of Onset    Hypertension Mother      Hyperlipidemia Father      Hypothyroidism Father      Hyperlipidemia Other sibling     Hypothyroidism Other sibling         Allergies  Vancomycin    Review of Systems   All other systems reviewed and are negative.       Physical Exam  Constitutional:       General: He is not in acute distress.  HENT:      Head: Normocephalic and atraumatic.   Cardiovascular:      Rate and Rhythm: Normal rate and regular rhythm.   Pulmonary:      Effort: Pulmonary effort is normal.   Neurological:      Mental Status: He is alert.          Last Recorded Vitals  Blood pressure 140/75, pulse 74, temperature 36.2 °C (97.2 °F), temperature source Temporal, resp. rate 18, height 1.727 m (5' 8\"), weight 83.1 kg (183 lb 3.2 oz), SpO2 97 %.    Relevant Results             Assessment/Plan "   Principal Problem:    Prostate cancer (CMS/HCC)      Proceed to OR for transperineal prostate biopsy           Kermit Conte MD

## 2024-02-21 LAB
LAB AP ASR DISCLAIMER: NORMAL
LABORATORY COMMENT REPORT: NORMAL
PATH REPORT.FINAL DX SPEC: NORMAL
PATH REPORT.GROSS SPEC: NORMAL
PATH REPORT.RELEVANT HX SPEC: NORMAL
PATH REPORT.TOTAL CANCER: NORMAL

## 2024-02-25 DIAGNOSIS — E03.9 ACQUIRED HYPOTHYROIDISM: ICD-10-CM

## 2024-02-25 RX ORDER — LEVOTHYROXINE SODIUM 100 UG/1
100 TABLET ORAL DAILY
Qty: 90 TABLET | Refills: 2 | Status: SHIPPED | OUTPATIENT
Start: 2024-02-25

## 2024-02-29 ENCOUNTER — APPOINTMENT (OUTPATIENT)
Dept: UROLOGY | Facility: CLINIC | Age: 74
End: 2024-02-29
Payer: MEDICARE

## 2024-02-29 ENCOUNTER — OFFICE VISIT (OUTPATIENT)
Dept: UROLOGY | Facility: CLINIC | Age: 74
End: 2024-02-29
Payer: MEDICARE

## 2024-02-29 VITALS
DIASTOLIC BLOOD PRESSURE: 68 MMHG | BODY MASS INDEX: 28.63 KG/M2 | RESPIRATION RATE: 16 BRPM | WEIGHT: 188.93 LBS | HEIGHT: 68 IN | HEART RATE: 71 BPM | SYSTOLIC BLOOD PRESSURE: 100 MMHG

## 2024-02-29 DIAGNOSIS — C61 PROSTATE CANCER (MULTI): ICD-10-CM

## 2024-02-29 PROBLEM — R97.20 ELEVATED PSA: Status: RESOLVED | Noted: 2023-03-07 | Resolved: 2024-02-29

## 2024-02-29 PROCEDURE — 99215 OFFICE O/P EST HI 40 MIN: CPT | Performed by: UROLOGY

## 2024-02-29 PROCEDURE — 1126F AMNT PAIN NOTED NONE PRSNT: CPT | Performed by: UROLOGY

## 2024-02-29 PROCEDURE — 1160F RVW MEDS BY RX/DR IN RCRD: CPT | Performed by: UROLOGY

## 2024-02-29 PROCEDURE — 1159F MED LIST DOCD IN RCRD: CPT | Performed by: UROLOGY

## 2024-02-29 PROCEDURE — 3074F SYST BP LT 130 MM HG: CPT | Performed by: UROLOGY

## 2024-02-29 PROCEDURE — 1036F TOBACCO NON-USER: CPT | Performed by: UROLOGY

## 2024-02-29 PROCEDURE — 3078F DIAST BP <80 MM HG: CPT | Performed by: UROLOGY

## 2024-02-29 ASSESSMENT — PAIN SCALES - GENERAL: PAINLEVEL: 0-NO PAIN

## 2024-02-29 NOTE — PROGRESS NOTES
PRIOR NOTES  73-year-old male here to see me regarding prostate cancer  PMH: Hypertension, hyperlipidemia, AV block, BPH, CABG 5y ago  PSH: hiatal hernia, inguinal hernias  FH: 2 brothers with prostate cancer, father with prostate cancer, all of them were treated  On nitro prn  Previously followed with my colleague, Dr. Espinosa.  Last seen 07/2023.  Patient is unable to get an MRI due to abandoned pacer leads.     -09/2022: Initial diagnosis, low risk prostate cancer, [place] 3+3 in right apex, left apex, left mid  -12/22: 7.75  -04/23: PSA 12.76  -07/23: 9.6     Admitted 11/1/23 w/ chest pain, work-up was negative.      Endorses: no daytime frequency, occasional urge, mildly weak stream  Denies: hesitancy, hematuria, leakage, post-void dribble  Also, NTF 3-4x worse in recent months.     2/13/24 - TP biopsy 31g, PSAD 0.3 YEIMY L apex <50% prostate (pT2a)  Path - GG2 LPM, LPL, LB, LA up to 90% tissue, RA GG1, RPL GG1    Risk group: Intermediate risk, unfavorable (>50% biopsy cores), PSA >10 (x1), GG2, pT2a    UPDATED SUBJECTIVE HISTORY  02/29/24 -here to discuss pathology report    Past Medical History  He has a past medical history of Heart disease, Hyperlipidemia, Hypertension, and Hypothyroidism.    Surgical History  He has a past surgical history that includes Other surgical history (08/06/2019); Other surgical history (05/27/2020); Other surgical history (05/27/2020); Other surgical history (04/16/2020); CT angio neck (06/17/2023); CT angio head w and wo IV contrast (06/17/2023); Coronary artery bypass graft; and Hernia repair.     Social History  He reports that he has never smoked. He has never used smokeless tobacco. He reports current alcohol use. He reports that he does not use drugs.    Family History  Family History   Problem Relation Name Age of Onset    Hypertension Mother      Hyperlipidemia Father      Hypothyroidism Father      Hyperlipidemia Other sibling     Hypothyroidism Other sibling        "  Allergies  Vancomycin    ROS: 12 system review was completed and is negative with the exception of those signs and symptoms noted in the history of present illness: A 12 system review was completed and is negative with the exception of those signs and symptoms noted in the history of present illness.     Exam:  General: in NAD, appears stated age  Head: normocephalic, atraumatic  Respiratory: normal effort, no use of accessory muscles  Cardiovascular: no edema noted  Skin: normal turgor, no rashes  Neurologic: grossly intact, oriented to person/place/time  Psychiatric: mode and affect appropriate     Last Recorded Vitals  Blood pressure 100/68, pulse 71, resp. rate 16, height 1.727 m (5' 8\"), weight 85.7 kg (188 lb 15 oz).    Lab Results   Component Value Date    PSASCREEN CANCELED 07/17/2023    CREATININE 0.90 07/31/2023         ASSESSMENT/PLAN:  # Intermediate risk unfavorable prostate cancer  -Recommend repeat PSA  -Referral to genetics counselor given strong family history  -CT and bone scan to complete staging  -Suspect patient will choose radiation, in which case he would get 6 months of ADT, Space[employer], prostate fiducials  -Discussed the side effects of ADT today    We had a long discussion together today regarding his new diagnosis of prostate cancer. I informed him that about 250,000 cases of prostate cancer are diagnosed annually, and despite this, only about 3% of those patients go on to die of prostate cancer. The reason for this is mostly twofold: 1) prostate cancer is not an aggressive malignancy in general; and 2) there are excellent treatment options.      We then went through the variable treatment options which would be available to him. We first discussed surgery. I described to him the anatomy of the genitourinary tract, where the prostate is located, and how we remove it. We discussed that, after the prostate is removed, it then requires us to reanastomose the bladder to the urethra, using a " catheter as an internal splint. We also discussed that the nerves that control erectile dysfunction are adjacent to the prostate and will be required to be dissected off of the prostate at the time of surgery. We discussed that the surgery can be done as an open technique or now with laparoscopic robotic assistance. We discussed the intraoperative risks of the surgery, which include, but are not limited to bleeding, infection, and damage to adjacent structures, such as the ureters or rectum, which may require further surgery.      We discussed surgical outcomes, comparing open to robotic. I think these are similar, with the exception of robotic surgery having less recovery time and less blood loss. This is well documented in the medical literature.    We discussed the postoperative course after surgery, which would require likely 1-2 days in the hospital if it is done robotically and a full recovery course in approximately three weeks. We discussed that he would be discharged with the catheter in place, and this catheter typically stays in place for approximately one week. We discussed postoperative complications related to the surgery. We focused on both incontinence and erectile dysfunction individually. I told him that, after catheter removal, the average timed incontinence is approximately 45 days and that, at one year, 95% of patients are dry. We also discussed a return of erectile function. I told him that after surgery, it is likely that his erections will not be as strong, and it may require up to a year before he regains function. Overall, about 70% of patients who had good erectile function preoperatively regain function, although the some require the use of Cialis, Levitra, Viagra, or the like.     We then discussed brachytherapy and radiation therapy. I discussed how each is performed. We discussed the potential risks of radiation, which include, but are not limited to, urethral stricture, incontinence,  and significant lower urinary tract symptoms, such as frequency, urgency, dysuria, irritation of the rectum. We discussed oncologic outcomes of radiation versus surgery. We discussed the role of neoadjuvant hormone therapy with radiation. I think he would likely have excellent oncologic results with any type of intervention.      We then compared salvage therapies for radiation failures versus surgery failures. Certainly, if a cancer recurs locally after surgery, he is then a candidate for adjuvant radiation. Prostate cancer recurrence after initial radiation therapy is much more difficult to treat. It is treatable with cryoablation, although that is experimental. Salvage prostatectomy carries with it a significant risk of incontinence (probably 50%), and almost all patients have impotence after such salvage therapy.     Remigio Ceballos MD

## 2024-03-01 ENCOUNTER — LAB (OUTPATIENT)
Dept: LAB | Facility: LAB | Age: 74
End: 2024-03-01
Payer: MEDICARE

## 2024-03-01 DIAGNOSIS — C61 PROSTATE CANCER (MULTI): ICD-10-CM

## 2024-03-01 LAB
CREAT SERPL-MCNC: 1.1 MG/DL (ref 0.5–1.3)
EGFRCR SERPLBLD CKD-EPI 2021: 70 ML/MIN/1.73M*2

## 2024-03-01 PROCEDURE — 36415 COLL VENOUS BLD VENIPUNCTURE: CPT

## 2024-03-01 PROCEDURE — 82565 ASSAY OF CREATININE: CPT

## 2024-03-08 ENCOUNTER — HOSPITAL ENCOUNTER (OUTPATIENT)
Dept: RADIATION ONCOLOGY | Facility: CLINIC | Age: 74
Setting detail: RADIATION/ONCOLOGY SERIES
Discharge: HOME | End: 2024-03-08
Payer: MEDICARE

## 2024-03-08 ENCOUNTER — LAB (OUTPATIENT)
Dept: LAB | Facility: LAB | Age: 74
End: 2024-03-08
Payer: MEDICARE

## 2024-03-08 VITALS
BODY MASS INDEX: 29.27 KG/M2 | RESPIRATION RATE: 18 BRPM | DIASTOLIC BLOOD PRESSURE: 80 MMHG | HEART RATE: 71 BPM | WEIGHT: 186.51 LBS | SYSTOLIC BLOOD PRESSURE: 127 MMHG | OXYGEN SATURATION: 97 % | HEIGHT: 67 IN | TEMPERATURE: 97.3 F

## 2024-03-08 DIAGNOSIS — C61 PROSTATE CANCER (MULTI): Primary | ICD-10-CM

## 2024-03-08 DIAGNOSIS — C61 PROSTATE CANCER (MULTI): ICD-10-CM

## 2024-03-08 LAB — TESTOST SERPL-MCNC: 459 NG/DL (ref 240–1000)

## 2024-03-08 PROCEDURE — 84154 ASSAY OF PSA FREE: CPT

## 2024-03-08 PROCEDURE — 99205 OFFICE O/P NEW HI 60 MIN: CPT | Performed by: RADIOLOGY

## 2024-03-08 PROCEDURE — 84403 ASSAY OF TOTAL TESTOSTERONE: CPT

## 2024-03-08 PROCEDURE — 36415 COLL VENOUS BLD VENIPUNCTURE: CPT

## 2024-03-08 PROCEDURE — 99215 OFFICE O/P EST HI 40 MIN: CPT | Performed by: RADIOLOGY

## 2024-03-08 PROCEDURE — 84153 ASSAY OF PSA TOTAL: CPT

## 2024-03-08 ASSESSMENT — PATIENT HEALTH QUESTIONNAIRE - PHQ9
SUM OF ALL RESPONSES TO PHQ9 QUESTIONS 1 AND 2: 0
1. LITTLE INTEREST OR PLEASURE IN DOING THINGS: NOT AT ALL
2. FEELING DOWN, DEPRESSED OR HOPELESS: NOT AT ALL

## 2024-03-08 ASSESSMENT — ENCOUNTER SYMPTOMS
OCCASIONAL FEELINGS OF UNSTEADINESS: 0
LOSS OF SENSATION IN FEET: 0
DEPRESSION: 0

## 2024-03-08 ASSESSMENT — PAIN SCALES - GENERAL: PAINLEVEL: 0-NO PAIN

## 2024-03-08 NOTE — PROGRESS NOTES
Radiation Oncology Outpatient Consult    Patient Name:  Mao Ordoñez  MRN:  [id number]  :  1950    Referring Provider: Remigio Ceballos MD  Primary Care Provider: Shadi Hazel MD  Care Team: Patient Care Team:  Shadi Hazel MD as PCP - General  Shadi Hazel MD as PCP - [facility] Attributed Provider    Date of Service: 3/8/2024       SUMMARY: 73 y.o. male with unfavorable-intermediate risk prostate cancer, [id number], Yuko score 3+4=7, 11/16 positive cores, pPSA 12.76, Grade group 2    SUBJECTIVE  History of Present Illness:  Mao Ordoñez is a 73 y.o. male who was referred by Remigio Ceballos MD, for a consultation to the [facility] Department of Radiation Oncology.  He is presenting for evaluation and management of prostate cancer    He was on active surveillance for low risk prostate cancer since 2022. Most recently underwent a biopsy  which demonstrated Yuko score 3+4=7, 11/16 positive cores.  MRI of the prostate was not completed. PSMA PET/CT is pending    Today, the patient reports feeling well overall. He denies changes in his symptoms.  His sexual Health Inventory for Men score (RAZIA) is 6. His International Prostate Symptom Score (IPSS) score is 5. He denies diarrhea or constipation. He denies bone pain.    Prior Radiotherapy:  No  Radiation Treatments       No radiation treatments to show. (Treatments may have been administered in another system.)          Current Systemic Treatment:  No     Presence of Pacemaker or ICD:  No - but has CIED leads which make him MRI incompatible.    Past Medical History:    Past Medical History:   Diagnosis Date    Heart disease     Hyperlipidemia     Hypertension     Hypothyroidism      History of autoimmune disease: No  History of Cancer: No    Past Surgical History:    Past Surgical History:   Procedure Laterality Date    CORONARY ARTERY BYPASS GRAFT      5 vessels    CT ANGIO NECK  2023    CT NECK ANGIO  W AND WO IV CONTRAST 6/17/2023    CT HEAD ANGIO W AND WO IV CONTRAST  06/17/2023    CT HEAD ANGIO W AND WO IV CONTRAST 6/17/2023    HERNIA REPAIR      OTHER SURGICAL HISTORY  08/06/2019    Bypass    OTHER SURGICAL HISTORY  05/27/2020    Hernia repair    OTHER SURGICAL HISTORY  05/27/2020    Vasectomy    OTHER SURGICAL HISTORY  04/16/2020    Colonoscopy        Family History:  Cancer-related family history is not on file.    Social History:    Social History     Tobacco Use    Smoking status: Never     Passive exposure: Never    Smokeless tobacco: Never   Substance Use Topics    Alcohol use: Yes     Comment: occasionally    Drug use: Never     Where does the patient live: [place]  Can they come to [place] for treatment (Yes/No):  Yes  Can they go to [facility] for treatment if indicated (Yes/No): Yes  If no [place], then where would want to be treated:-   Smoking history (Current/Former/Never): No  Occupation: Retired    Social work Assessment:  Does the patient require social work referral (Yes/No): No    Allergies:    Allergies   Allergen Reactions    Vancomycin Unknown        Medications:    Current Outpatient Medications:     aspirin 81 mg EC tablet, Take 2 tablets (162 mg) by mouth 1 time., Disp: , Rfl:     atorvastatin (Lipitor) 80 mg tablet, Take 1 tablet (80 mg) by mouth once daily., Disp: , Rfl:     ezetimibe (Zetia) 10 mg tablet, Take 1 tablet (10 mg) by mouth once daily., Disp: , Rfl:     levothyroxine (Synthroid, Levoxyl) 100 mcg tablet, TAKE 1 TABLET BY MOUTH ONCE DAILY., Disp: 90 tablet, Rfl: 2    lisinopril 20 mg tablet, Take 1 tablet (20 mg) by mouth once daily., Disp: , Rfl:     loratadine (Claritin) 10 mg tablet, Take 1 tablet (10 mg) by mouth once daily., Disp: , Rfl:     metoprolol succinate XL (Toprol-XL) 25 mg 24 hr tablet, Take 1 tablet (25 mg) by mouth once daily., Disp: , Rfl:     nitroglycerin (Nitrostat) 0.4 mg SL tablet, Place 1 tablet (0.4 mg) under the tongue if needed for chest pain.  "DISSOLVE 1 TABLET UNDER THE TONGUE AS NEEDED FOR CHEST PAIN. IF NO PAIN RELIEF CALL 911., Disp: , Rfl:     ranolazine (Ranexa) 500 mg 12 hr tablet, Take 1 tablet (500 mg) by mouth 2 times a day., Disp: , Rfl:     triamcinolone acetonide 0.025 % lotion, Apply topically 2 times a day., Disp: 60 mL, Rfl: 2    tadalafil (Cialis) 20 mg tablet, Take 1 tablet (20 mg) by mouth every 3rd day if needed for erectile dysfunction. Maximum 1 tablet in 3 days, Disp: 10 tablet, Rfl: 2      Review of Systems:  Review of Systems   Constitutional: Negative.    HENT:  Negative.     Eyes:  Eye problems: Wears glasses.   Respiratory: Negative.     Cardiovascular: Negative.    Gastrointestinal: Negative.         Moves bowels daily, soft and formed   Endocrine: Negative.    Genitourinary:  Positive for nocturia (x2).         Voids 4x/day   Musculoskeletal: Negative.    Skin: Negative.    Neurological: Negative.    Hematological: Negative.    Psychiatric/Behavioral: Negative.       The patient's current pain level was assessed.  They report currently having a pain of 0 out of 10.  They feel their pain is under control without the use of pain medications.      Performance Status:  The Karnofsky performance scale today is 100, Fully active, able to carry on all pre-disease performed without restriction (ECOG equivalent 0).            OBJECTIVE  Physical Exam:  /80 (BP Location: Right arm, Patient Position: Sitting, BP Cuff Size: Large adult)   Pulse 71   Temp 36.3 °C (97.3 °F) (Temporal)   Resp 18   Ht (S) 1.706 m (5' 7.17\")   Wt 84.6 kg (186 lb 8.2 oz)   SpO2 97%   BMI 29.07 kg/m²    Constitutional: Awake, alert and oriented. No acute distress. Appears stated age.  Eyes: Conjunctivae are clear without exudates or hemorrhage. Eyelids are normal in appearance without swelling or lesions.  ENMT: mucous membranes moist, no apparent injury, no lesions seen  Neck: Neck supple, no apparent injury, trachea midline  Resp/Thorax: Patent " airways,  good chest expansion. Thorax symmetric  Cardiovascular: The external chest is normal without lifts, heaves, or thrills. PMI is not visible.  GI: Nondistended, soft, non-tender.  /Gyn: Deferred  MSK: No tenderness noted on palpation of the spine. No ROM limitations.  Extremities: normal extremities, no cyanosis, erythema or edema.  Neurological: alert and oriented x3. Sensory and motor function appear intact. No gait abnormality observed.  Psych: Appropriate mood and behavior.  Skin: Warm and dry, no new lesions or rashes.      Laboratory Review:    PSA   Date Value Ref Range Status   03/08/2024 10.7 (H) 0.0 - 4.0 ng/mL Final     Comment:     INTERPRETIVE INFORMATION: Prostate Specific Antigen    The Roche PSA electrochemiluminescent immunoassay is used. Results   obtained with different test methods or kits cannot be used   interchangeably. The Roche PSA method is approved for use as an   aid in the detection of prostate cancer when used in conjunction   with a digital rectal exam in individuals with a prostate age 50   years and older. The Roche PSA is also indicated for the serial   measurement of PSA to aid in the prognosis and management of   prostate cancer patients. Elevated PSA concentrations can only   suggest the presence of prostate cancer until biopsy is performed.   PSA concentrations can also be elevated in benign prostatic   hyperplasia or inflammatory conditions of the prostate. PSA is   generally not elevated in healthy individuals or individuals with   nonprostatic carcinoma.   07/17/2023 9.62 (H) 0.00 - 4.00 ng/mL Final     Comment:     The FDA requires that the method used for PSA assay be   reported to the physician. Values obtained with different   assay methods must not be used interchangeably. This test  was performed at [facility] using the Access   Hybritech PSA assay is a two-site immunoenzymatic sandwich   assay. The assay is approved for measurement of    prostate-specific antigen (PSA)in serum and may be used   in conjunction with a digital rectal examination in men   50 years and older as an aid in detection of prostate   cancer.  5-Alpha-reductase inhibitors (e.g. Proscar, Finasteride,   Avodart, Dutasteride and Roxy) for the treatment of BPH   have been shown to lower PSA levels by an average of 50%   after 6 months of treatment.     04/03/2023 12.76 (H) 0.00 - 4.00 ng/mL Final     Comment:     The FDA requires that the method used for PSA assay be   reported to the physician. Values obtained with different   assay methods must not be used interchangeably. This test  was performed at [facility] using the Access   Hybritech PSA assay is a two-site immunoenzymatic sandwich   assay. The assay is approved for measurement of   prostate-specific antigen (PSA)in serum and may be used   in conjunction with a digital rectal examination in men   50 years and older as an aid in detection of prostate   cancer.  5-Alpha-reductase inhibitors (e.g. Proscar, Finasteride,   Avodart, Dutasteride and Roxy) for the treatment of BPH   have been shown to lower PSA levels by an average of 50%   after 6 months of treatment.     12/27/2022 7.75 (H) 0.00 - 4.00 ng/mL Final     Comment:     The FDA requires that the method used for PSA assay be   reported to the physician. Values obtained with different   assay methods must not be used interchangeably. This test  was performed at [facility] using the Access   Hybritech PSA assay is a two-site immunoenzymatic sandwich   assay. The assay is approved for measurement of   prostate-specific antigen (PSA)in serum and may be used   in conjunction with a digital rectal examination in men   50 years and older as an aid in detection of prostate   cancer.  5-Alpha-reductase inhibitors (e.g. Proscar, Finasteride,   Avodart, Dutasteride and Roxy) for the treatment of BPH   have been shown to lower PSA levels by an  average of 50%   after 6 months of treatment.     06/15/2022 6.94 (H) 0.00 - 4.00 ng/mL Final     Comment:     The FDA requires that the method used for PSA assay be   reported to the physician. Values obtained with different   assay methods must not be used interchangeably. This test  was performed at [facility] using the Access   Hybritech PSA assay is a two-site immunoenzymatic sandwich   assay. The assay is approved for measurement of   prostate-specific antigen (PSA)in serum and may be used   in conjunction with a digital rectal examination in men   50 years and older as an aid in detection of prostate   cancer.  5-Alpha-reductase inhibitors (e.g. Proscar, Finasteride,   Avodart, Dutasteride and Roxy) for the treatment of BPH   have been shown to lower PSA levels by an average of 50%   after 6 months of treatment.     03/15/2022 6.70 (H) 0.00 - 4.00 ng/mL Final     Comment:     The FDA requires that the method used for PSA assay be   reported to the physician. Values obtained with different   assay methods must not be used interchangeably. This test  was performed at [facility] using the Access   Hybritech PSA assay is a two-site immunoenzymatic sandwich   assay. The assay is approved for measurement of   prostate-specific antigen (PSA)in serum and may be used   in conjunction with a digital rectal examination in men   50 years and older as an aid in detection of prostate   cancer.  5-Alpha-reductase inhibitors (e.g. Proscar, Finasteride,   Avodart, Dutasteride and Roxy) for the treatment of BPH   have been shown to lower PSA levels by an average of 50%   after 6 months of treatment.       PSA, Free   Date Value Ref Range Status   03/08/2024 1.0 ng/mL Final     PSA, Free Pct   Date Value Ref Range Status   03/08/2024 9 % Final     Comment:     INTERPRETIVE INFORMATION: Prostate Specific Antigen, Free                            Percentage    [facility] uses the Roche Free PSA  electrochemiluminescent immunoassay   method in conjunction with the Roche PSA electrochemiluminescent   immunoassay method to determine the free PSA percentage. Values   obtained with different assay methods should not be used   interchangeably. The free PSA percentage is an aid in   distinguishing prostate cancer from benign prostatic conditions in   individuals with a prostate age 50 years and older with a total   PSA between 3 and 10 ng/mL and negative digital rectal examination   findings. Prostatic biopsy is required for the diagnosis of   cancer.     In patients with total PSA concentrations of 4-10 ng/mL, the   probability of finding prostate cancer on needle biopsy by age in   years is:    %fPSA               50-59    60-69    70 or older  0  - 10%            49%      58%      65%  11 - 18%            27%      34%      41%  19 - 25%            18%      24%      30%  Greater than 25%     9%      12%      16%    Other factors may help determine the actual risk of prostate   cancer in individual patients.  Performed By: [facility]  [address] [place], [place] [id number]  : Augusto Bella MD, PhD  CLIA Number: [id number]       The pertinent lab results were reviewed and discussed with the patient.      Pathology Review:  The pertinent pathology results were reviewed and discussed with the patient.  Notably,  FINAL DIAGNOSIS   A.  PROSTATE, RIGHT POSTERIOR MEDIAL, NEEDLE BIOPSY:  --PROSTATE TISSUE WITH NO EVIDENCE OF MALIGNANCY.     B.  PROSTATE, RIGHT POSTERIOR LATERAL, NEEDLE BIOPSIES:  --ADENOCARCINOMA, ANNELISE PATTERN 3 + 3 = 6, GRADE GROUP 1, INVOLVING 2 OF 2 CORES, AND LESS THAN 5% OF THE SPECIMEN.     C.  PROSTATE, RIGHT BASE, NEEDLE BIOPSY:  --PROSTATE TISSUE WITH NO EVIDENCE OF MALIGNANCY.     D.  PROSTATE, LEFT POSTERIOR MEDIAL, NEEDLE BIOPSIES:  --ADENOCARCINOMA, ANNELISE PATTERN 3 + 4 = 7, GRADE GROUP 2, INVOLVING 2 OF 2 CORES, AND APPROXIMATELY 75% OF THE SPECIMEN.  SEE NOTE.     Note: Immunostain for PIN-4 confirms the absence of basal cells in the malignant acini.     E.  PROSTATE, LEFT POSTERIOR LATERAL, NEEDLE BIOPSIES:  --ADENOCARCINOMA, ANNELISE PATTERN 3 + 4 = 7, GRADE GROUP 2, INVOLVING 2 OF 2 CORES, AND APPROXIMATELY 65% OF THE SPECIMEN. SEE NOTE.     Note: Immunostain for PIN-4 confirms the absence of basal cells in the malignant acini.     F.  PROSTATE, LEFT BASE, NEEDLE BIOPSIES:  --ADENOCARCINOMA, ANNELISE PATTERN 3 + 4 = 7, GRADE GROUP 2, INVOLVING 2 OF 2 CORES, AND APPROXIMATELY 90% OF THE SPECIMEN. SEE NOTE.     Note: Immunostain for PIN-4 confirms the absence of basal cells in the malignant acini.     G.  PROSTATE, RIGHT ANTERIOR, NEEDLE BIOPSIES:  --ADENOCARCINOMA, ANNELISE PATTERN 3 + 3 = 6, GRADE GROUP 1, INVOLVING 1 OF 2 CORES, AND LESS THAN 5% OF THE SPECIMEN.     H.  PROSTATE, LEFT ANTERIOR, NEEDLE BIOPSIES:  --ADENOCARCINOMA, ANNELISE PATTERN 3 + 4 = 7, GRADE GROUP 2, INVOLVING 2 OF 2 CORES, AND APPROXIMATELY 60% OF THE SPECIMEN. SEE NOTE.        Imaging:  pending PSMA PET/CT       ASSESSMENT:  Mao Ordoñez is a 73 y.o. male with Prostate cancer (CMS/[facility]), Clinical: Stage IIB (cT2a, cN0, cM0, PSA: 12.8, Grade Group: 2).      COUNSELING AND COORDINATION OF CARE:    The natural history of prostate cancer was reviewed with the patient. Prognostic factors including the following were discussed: clinical staging by YEIMY, pretreatment PSA, GS/grade group on biopsy, and the number of biopsy cores involved with cancer, cancer volume, and the patient's performance status/comorbidities.    The patient's the clinical presentation, PSA lab findings and imaging findings were presented. Based on his risk factors, he would be classified as unfavorable intermediate risk.    We discussed with the patient treatment options including radical prostatectomy, radiation therapy including stereotactic body radiation therapy, standard and hypofractionated radiation therapy with or  without short term androgen deprivation therapy using photons, protons and brachytherapy. Potential benefit, side effects and complications of each treatment was discussed.    The indications, benefits, side effects and complications of radiotherapy, which include both acute and late side effects were highlighted. Acute: Fatigue, dysuria, frequency, urine retention, rectal urgency, diarrhea. Late: Stricture, cystitis, proctitis, sexual dysfunction, second malignancy have all been discussed in detail with the patient. All questions were answered to the patient´s satisfaction.    We discussed the importance of fiducials and SpaceOAR for SBRT and proton radiation therapy.  We also discussed the benefit of SpaceOAR for all patients undergoing radiation therapy to help spare rectal toxicity.  We briefly discussed the process of prostate seed implant.     The patient had all questions answered and concerns addressed during the visit. The patient appears to be leaning towards radiotherapy at this time and would like to initiate preperation for treatment.        PLAN:    Ordered staging PET/CT  Repeat labs today  Treating 60 Gy in 20 fractions   ADT per Dr Ceballos      NCCN Guidelines were applicable to guide this patients treatment plan.   Kerrie Milian MD       Future Appointments   Date Time Provider Department Center   3/21/2024  1:00 PM PAR OP[facility]R ADMIN ROOM PET CT [facility]   3/21/2024  2:00 PM PAR OPCTR PET CT [facility]   4/11/2024 11:20 AM Remigio Ceballos MD [id number] [place]   4/23/2024 12:00 PM Yulia House MD PhD [id number] [employer]

## 2024-03-10 LAB
PSA FREE MFR SERPL: 9 %
PSA FREE SERPL-MCNC: 1 NG/ML
PSA SERPL IA-MCNC: 10.7 NG/ML (ref 0–4)

## 2024-03-11 NOTE — ADDENDUM NOTE
Encounter addended by: Kerrie Milian MD on: 3/11/2024 8:37 AM   Actions taken: Clinical Note Signed, Level of Service modified

## 2024-03-14 ENCOUNTER — HOSPITAL ENCOUNTER (OUTPATIENT)
Dept: RADIOLOGY | Facility: HOSPITAL | Age: 74
End: 2024-03-14
Payer: MEDICARE

## 2024-03-14 ENCOUNTER — APPOINTMENT (OUTPATIENT)
Dept: RADIOLOGY | Facility: HOSPITAL | Age: 74
End: 2024-03-14
Payer: MEDICARE

## 2024-03-21 ENCOUNTER — HOSPITAL ENCOUNTER (OUTPATIENT)
Dept: RADIOLOGY | Facility: CLINIC | Age: 74
Discharge: HOME | End: 2024-03-21
Payer: MEDICARE

## 2024-03-21 DIAGNOSIS — C61 PROSTATE CANCER (MULTI): ICD-10-CM

## 2024-03-21 PROCEDURE — 78815 PET IMAGE W/CT SKULL-THIGH: CPT | Mod: PI

## 2024-03-21 PROCEDURE — 3430000001 HC RX 343 DIAGNOSTIC RADIOPHARMACEUTICALS: Performed by: RADIOLOGY

## 2024-03-21 PROCEDURE — A9800 HC RX 343 DIAGNOSTIC RADIOPHARMACEUTICALS: HCPCS | Performed by: RADIOLOGY

## 2024-03-21 PROCEDURE — 78815 PET IMAGE W/CT SKULL-THIGH: CPT | Mod: PET TUMOR INIT TX STRAT | Performed by: NUCLEAR MEDICINE

## 2024-03-21 RX ADMIN — KIT FOR THE PREPARATION OF GALLIUM GA 68 GOZETOTIDE INJECTION 5.44 MILLICURIE: KIT INTRAVENOUS at 12:58

## 2024-03-22 DIAGNOSIS — C61 PROSTATE CANCER (MULTI): ICD-10-CM

## 2024-04-11 ENCOUNTER — OFFICE VISIT (OUTPATIENT)
Dept: UROLOGY | Facility: CLINIC | Age: 74
End: 2024-04-11
Payer: MEDICARE

## 2024-04-11 VITALS
WEIGHT: 189.15 LBS | SYSTOLIC BLOOD PRESSURE: 108 MMHG | RESPIRATION RATE: 14 BRPM | HEIGHT: 68 IN | HEART RATE: 73 BPM | BODY MASS INDEX: 28.67 KG/M2 | DIASTOLIC BLOOD PRESSURE: 75 MMHG

## 2024-04-11 DIAGNOSIS — C61 PROSTATE CANCER (MULTI): Primary | ICD-10-CM

## 2024-04-11 PROCEDURE — 1126F AMNT PAIN NOTED NONE PRSNT: CPT | Performed by: UROLOGY

## 2024-04-11 PROCEDURE — 3078F DIAST BP <80 MM HG: CPT | Performed by: UROLOGY

## 2024-04-11 PROCEDURE — 99214 OFFICE O/P EST MOD 30 MIN: CPT | Performed by: UROLOGY

## 2024-04-11 PROCEDURE — 1036F TOBACCO NON-USER: CPT | Performed by: UROLOGY

## 2024-04-11 PROCEDURE — 1160F RVW MEDS BY RX/DR IN RCRD: CPT | Performed by: UROLOGY

## 2024-04-11 PROCEDURE — 3074F SYST BP LT 130 MM HG: CPT | Performed by: UROLOGY

## 2024-04-11 PROCEDURE — 1159F MED LIST DOCD IN RCRD: CPT | Performed by: UROLOGY

## 2024-04-11 PROCEDURE — G2211 COMPLEX E/M VISIT ADD ON: HCPCS | Performed by: UROLOGY

## 2024-04-11 ASSESSMENT — PAIN SCALES - GENERAL: PAINLEVEL: 0-NO PAIN

## 2024-04-11 NOTE — PROGRESS NOTES
PRIOR NOTES  73-year-old male here to see me regarding prostate cancer  PMH: Hypertension, hyperlipidemia, AV block, BPH, CABG 5y ago  PSH: hiatal hernia, inguinal hernias  FH: 2 brothers with prostate cancer, father with prostate cancer, all of them were treated  On nitro prn  Previously followed with my colleague, Dr. Espinosa.  Last seen 07/2023.  Patient is unable to get an MRI due to abandoned pacer leads.     -09/2022: Initial diagnosis, low risk prostate cancer, [employer] 3+3 in right apex, left apex, left mid  -12/22: 7.75  -04/23: PSA 12.76  -07/23: 9.6     Admitted 11/1/23 w/ chest pain, work-up was negative.      Endorses: no daytime frequency, occasional urge, mildly weak stream  Denies: hesitancy, hematuria, leakage, post-void dribble  Also, NTF 3-4x worse in recent months.      2/13/24 - TP biopsy 31g, PSAD 0.3 YEIMY L apex <50% prostate (pT2a)  Path - GG2 LPM, LPL, LB, LA up to 90% tissue, RA GG1, RPL GG1     Risk group: Intermediate risk, unfavorable (>50% biopsy cores), PSA >10 (x1), GG2, pT2a  - Opted for ADT x 6mo and XRT    PSMA PET - [place] elsewhere    UPDATED SUBJECTIVE HISTORY  04/11/24 - Pt is scheduled for spaceOAR and fids 4/26/24    Past Medical History  He has a past medical history of Heart disease, Hyperlipidemia, Hypertension, and Hypothyroidism.    Surgical History  He has a past surgical history that includes Other surgical history (08/06/2019); Other surgical history (05/27/2020); Other surgical history (05/27/2020); Other surgical history (04/16/2020); CT angio neck (06/17/2023); CT angio head w and wo IV contrast (06/17/2023); Coronary artery bypass graft; and Hernia repair.     Social History  He reports that he has never smoked. He has never been exposed to tobacco smoke. He has never used smokeless tobacco. He reports current alcohol use. He reports that he does not use drugs.    Family History  Family History   Problem Relation Name Age of Onset    Hypertension Mother       "Hyperlipidemia Father      Hypothyroidism Father      Hyperlipidemia Other sibling     Hypothyroidism Other sibling         Allergies  Vancomycin    ROS: 12 system review was completed and is negative with the exception of those signs and symptoms noted in the history of present illness: A 12 system review was completed and is negative with the exception of those signs and symptoms noted in the history of present illness.     Exam:  General: in NAD, appears stated age  Head: normocephalic, atraumatic  Respiratory: normal effort, no use of accessory muscles  Cardiovascular: no edema noted  Skin: normal turgor, no rashes  Neurologic: grossly intact, oriented to person/place/time  Psychiatric: mode and affect appropriate     Last Recorded Vitals  Blood pressure 108/75, pulse 73, resp. rate 14, height 1.727 m (5' 8\"), weight 85.8 kg (189 lb 2.5 oz).    Lab Results   Component Value Date    PSASCREEN CANCELED 07/17/2023    CREATININE 1.10 03/01/2024         ASSESSMENT/PLAN:  74 yo w/ immediate risk unfavorable prostate cancer  -We discussed treatment options, including radiation versus surgery  -He was specifically interested in discussing side effects of androgen deprivation, he wanted to explore the potential for surgery and the means to avoid hormone deprivation.  I explained to him that it would be possible that he would need hormone deprivation postop if he needed adjuvant or salvage radiation or other disease recurrence  -Ultimately, it seems he will proceed with ADT x 6 months plus radiation as initially planned    Remigio Ceballos MD    "

## 2024-04-15 ENCOUNTER — OFFICE VISIT (OUTPATIENT)
Dept: UROLOGY | Facility: CLINIC | Age: 74
End: 2024-04-15
Payer: MEDICARE

## 2024-04-15 VITALS — DIASTOLIC BLOOD PRESSURE: 75 MMHG | HEART RATE: 79 BPM | SYSTOLIC BLOOD PRESSURE: 108 MMHG

## 2024-04-15 DIAGNOSIS — C61 PROSTATE CANCER (MULTI): ICD-10-CM

## 2024-04-15 PROCEDURE — 1160F RVW MEDS BY RX/DR IN RCRD: CPT | Performed by: UROLOGY

## 2024-04-15 PROCEDURE — 96402 CHEMO HORMON ANTINEOPL SQ/IM: CPT | Performed by: UROLOGY

## 2024-04-15 PROCEDURE — 3078F DIAST BP <80 MM HG: CPT | Performed by: UROLOGY

## 2024-04-15 PROCEDURE — 1159F MED LIST DOCD IN RCRD: CPT | Performed by: UROLOGY

## 2024-04-15 PROCEDURE — 1036F TOBACCO NON-USER: CPT | Performed by: UROLOGY

## 2024-04-15 PROCEDURE — 3074F SYST BP LT 130 MM HG: CPT | Performed by: UROLOGY

## 2024-04-15 NOTE — PROGRESS NOTES
PRIOR NOTES  73-year-old male here to see me regarding prostate cancer  PMH: Hypertension, hyperlipidemia, AV block, BPH, CABG 5y ago  PSH: hiatal hernia, inguinal hernias  FH: 2 brothers with prostate cancer, father with prostate cancer, all of them were treated  On nitro prn  Previously followed with my colleague, Dr. Espinosa.  Last seen 07/2023.  Patient is unable to get an MRI due to abandoned pacer leads.     -09/2022: Initial diagnosis, low risk prostate cancer, [place] 3+3 in right apex, left apex, left mid  -12/22: 7.75  -04/23: PSA 12.76  -07/23: 9.6     Admitted 11/1/23 w/ chest pain, work-up was negative.      Endorses: no daytime frequency, occasional urge, mildly weak stream  Denies: hesitancy, hematuria, leakage, post-void dribble  Also, NTF 3-4x worse in recent months.      2/13/24 - TP biopsy 31g, PSAD 0.3 YEIMY L apex <50% prostate (pT2a)  Path - GG2 LPM, LPL, LB, LA up to 90% tissue, RA GG1, RPL GG1     Risk group: Intermediate risk, unfavorable (>50% biopsy cores), PSA >10 (x1), GG2, pT2a  - Opted for ADT x 6mo and XRT     PSMA PET - [place] elsewhere    UPDATED SUBJECTIVE HISTORY  04/15/24 -here for 6-month Lupron    Past Medical History  He has a past medical history of Heart disease, Hyperlipidemia, Hypertension, and Hypothyroidism.    Surgical History  He has a past surgical history that includes Other surgical history (08/06/2019); Other surgical history (05/27/2020); Other surgical history (05/27/2020); Other surgical history (04/16/2020); CT angio neck (06/17/2023); CT angio head w and wo IV contrast (06/17/2023); Coronary artery bypass graft; and Hernia repair.     Social History  He reports that he has never smoked. He has never been exposed to tobacco smoke. He has never used smokeless tobacco. He reports current alcohol use. He reports that he does not use drugs.    Family History  Family History   Problem Relation Name Age of Onset    Hypertension Mother      Hyperlipidemia Father       Hypothyroidism Father      Hyperlipidemia Other sibling     Hypothyroidism Other sibling         Allergies  Vancomycin    ROS: 12 system review was completed and is negative with the exception of those signs and symptoms noted in the history of present illness: A 12 system review was completed and is negative with the exception of those signs and symptoms noted in the history of present illness.     Exam:  General: in NAD, appears stated age  Head: normocephalic, atraumatic  Respiratory: normal effort, no use of accessory muscles  Cardiovascular: no edema noted  Skin: normal turgor, no rashes  Neurologic: grossly intact, oriented to person/place/time  Psychiatric: mode and affect appropriate    Lupron injected into the upper outer butt cheek after sterilizing with alcohol swab.     Last Recorded Vitals  Blood pressure 108/75, pulse 79.    Lab Results   Component Value Date    PSASCREEN CANCELED 07/17/2023    CREATININE 1.10 03/01/2024         ASSESSMENT/PLAN:  # Prostate cancer  -Undergoing EBRT treatment in the near future  -Upcoming SpaceOAR/fiducials  -Androgen deprivation x 6 months administered today  -Check PSA and testosterone in 6 months    Remigio Ceballos MD

## 2024-04-23 ENCOUNTER — OFFICE VISIT (OUTPATIENT)
Dept: GENETICS | Facility: CLINIC | Age: 74
End: 2024-04-23
Payer: MEDICARE

## 2024-04-23 VITALS
HEART RATE: 67 BPM | HEIGHT: 68 IN | SYSTOLIC BLOOD PRESSURE: 142 MMHG | WEIGHT: 193.5 LBS | TEMPERATURE: 98.1 F | DIASTOLIC BLOOD PRESSURE: 90 MMHG | BODY MASS INDEX: 29.33 KG/M2

## 2024-04-23 DIAGNOSIS — Z91.89 AT RISK FOR HEREDITARY CANCER-PREDISPOSING SYNDROME: ICD-10-CM

## 2024-04-23 DIAGNOSIS — Z80.9 FAMILY HISTORY OF CANCER: ICD-10-CM

## 2024-04-23 DIAGNOSIS — C61 PROSTATE CANCER (MULTI): Primary | ICD-10-CM

## 2024-04-23 PROCEDURE — 1036F TOBACCO NON-USER: CPT | Performed by: STUDENT IN AN ORGANIZED HEALTH CARE EDUCATION/TRAINING PROGRAM

## 2024-04-23 PROCEDURE — 1159F MED LIST DOCD IN RCRD: CPT | Performed by: STUDENT IN AN ORGANIZED HEALTH CARE EDUCATION/TRAINING PROGRAM

## 2024-04-23 PROCEDURE — 99204 OFFICE O/P NEW MOD 45 MIN: CPT | Performed by: STUDENT IN AN ORGANIZED HEALTH CARE EDUCATION/TRAINING PROGRAM

## 2024-04-23 PROCEDURE — 3080F DIAST BP >= 90 MM HG: CPT | Performed by: STUDENT IN AN ORGANIZED HEALTH CARE EDUCATION/TRAINING PROGRAM

## 2024-04-23 PROCEDURE — 3077F SYST BP >= 140 MM HG: CPT | Performed by: STUDENT IN AN ORGANIZED HEALTH CARE EDUCATION/TRAINING PROGRAM

## 2024-04-23 PROCEDURE — 1160F RVW MEDS BY RX/DR IN RCRD: CPT | Performed by: STUDENT IN AN ORGANIZED HEALTH CARE EDUCATION/TRAINING PROGRAM

## 2024-04-23 NOTE — PROGRESS NOTES
Basic info:  Mao Ordoñez (Legal Name)   73 y.o., 1950   Legal sex: Male   Marital status:    Race: White   Ethnicity: Not , /a, or Congolese origin   Languages   English  MRN: [id number]    Requesting physician/service:  Reason for the visit / consultation:      Referred by: Remigio Ceballos MD  For: prostate cancer, family history    History of Present Illness:    The patient presented to the clinic with his wife.    An interactive audio and video telecommunication system that permits real-time communications between the patient (at the originating site) and provider (at the distant site) was utilized to provide this telehealth service.  Verbal consent was requested and obtained for a telehealth visit.     Mao Ordoñez is a 73 year old man presenting to clinic for genetics evaluation of his prostate cancer. He was initially diagnosed in 2022 with low risk [place] 3+3 score disease on active surveillance. His PSA levels have trended upward since then and he was re-biopsied 2024-02-13 which revealed an increase in stage [id number], score [place] 3+4. He is being treated by Dr. Ceballos in Urology and Dr. Milian in Rad Onc. He opted for a treatment plan of 6 months ADT and XRT. Family history is also remarkable for prostate cancer in his father and two brothers. His medical history is complicated by HTN, hyperlipidemia, hypothyroidism, AV block, and CABG 5 years ago. Surgical history is most remarkable for recurrent hernias.     Followup for prostate cancer diagnosis  Family history of prostate cancer - 2 brothers and father, one brother remain    From Dr. Ceballos:  -09/2022: Initial diagnosis, low risk prostate cancer, [place] 3+3 in right apex, left apex, left mid  -12/22: 7.75  -04/23: PSA 12.76  -07/23: 9.6     Admitted 11/1/23 w/ chest pain, work-up was negative.      Endorses: no daytime frequency, occasional urge, mildly weak stream  Denies: hesitancy, hematuria, leakage, post-void  dribble  Also, NTF 3-4x worse in recent months.      2/13/24 - TP biopsy 31g, PSAD 0.3 YEIMY L apex <50% prostate (pT2a)  Path - GG2 LPM, LPL, LB, LA up to 90% tissue, RA GG1, RPL GG1     Risk group: Intermediate risk, unfavorable (>50% biopsy cores), PSA >10 (x1), GG2, pT2a  - Opted for ADT x 6mo and XRT        Review of Systems:    Constitutional: No concerns reported. Was told hot flashes might be a side effect of hormone therapy.  Head and Neck:  No concerns reported.  Eyes:  Wears glasses.   ENT:  Some difficulty swallowing white rice.  Respiratory:  No concerns reported  Cardiovascular: CABG 3 y/o. HTN. 2nd degree AV block  Gastrointestinal:  No concerns reported  Genitourinary:  Gets up a couple times at night to go to the bathroom. Prostate cancer  Reproductive/Endocrine:  ED.   Musculoskeletal:  No concerns reported  Hematology/Lymphatic:  Bruises when scratching.  Skin: Dry skin.  Neurological:  No concerns reported  Psychiatric: No concerns reported       Pertinent positive and negative ROS are listed in HPI, PMH and above, otherwise reviewed in detail for 15 systems and negative      Past Medical History:      Hypertension   hyperlipidemia  AV block - 2nd degree  BPH  CABG 5y ago    Past Surgical History:      hiatal hernia  inguinal hernias  Knee surgery meniscus repair  Nose surgery  Vasectomy    Allergies:  Vancomycin (red man syndrome)     Immunizations:      Medications:      aspirin 81 mg EC tablet, Take 2 tablets (162 mg) by mouth 1 time., Disp: , Rfl:     atorvastatin (Lipitor) 80 mg tablet, Take 1 tablet (80 mg) by mouth once daily., Disp: , Rfl:     ezetimibe (Zetia) 10 mg tablet, Take 1 tablet (10 mg) by mouth once daily., Disp: , Rfl:     levothyroxine (Synthroid, Levoxyl) 100 mcg tablet, TAKE 1 TABLET BY MOUTH ONCE DAILY., Disp: 90 tablet, Rfl: 2    lisinopril 20 mg tablet, Take 1 tablet (20 mg) by mouth once daily., Disp: , Rfl:     loratadine (Claritin) 10 mg tablet, Take 1 tablet (10 mg) by  "mouth once daily., Disp: , Rfl:     metoprolol succinate XL (Toprol-XL) 25 mg 24 hr tablet, Take 1 tablet (25 mg) by mouth once daily., Disp: , Rfl:     nitroglycerin (Nitrostat) 0.4 mg SL tablet, Place 1 tablet (0.4 mg) under the tongue if needed for chest pain. DISSOLVE 1 TABLET UNDER THE TONGUE AS NEEDED FOR CHEST PAIN. IF NO PAIN RELIEF CALL 911., Disp: , Rfl:     ranolazine (Ranexa) 500 mg 12 hr tablet, Take 1 tablet (500 mg) by mouth 2 times a day., Disp: , Rfl:     triamcinolone acetonide 0.025 % lotion, Apply topically 2 times a day., Disp: 60 mL, Rfl: 2    tadalafil (Cialis) 20 mg tablet, Take 1 tablet (20 mg) by mouth every 3rd day if needed for erectile dysfunction. Maximum 1 tablet in 3 days, Disp: 10 tablet, Rfl: 2      Family History:    (Ref: scanned pedigree in the EMR)    Paternal ancestry: Central   Maternal ancestry:  Central   Ashkenazi Yazidi ancestry: none    #First degree relatives:  - Father: prostate cancer  - Mother:  - Full siblinx brothers: prostate cancer  - Offspring: One daughter with breast cancer at age 41    Other than the above, upon our specific inquiries, there is no report of family history of intellectual disability or learning disability, autism, birth defect, multiple miscarriage or stillbirth, infant or early childhood death, consanguinity, and other known genetic disease.     Social History:    Living with wife  Occupation:   Smoke? Never     Examination:       Basic Vital Sign Results:   Blood pressure 142/90, pulse 67, temperature 36.7 °C (98.1 °F), height 1.727 m (5' 8\"), weight 87.8 kg (193 lb 8 oz).    Physical Exam:   General: No distress. Spontaneous movements. Appears well-developed and well-nourished.  Skin: No rashes or jaundice. Warm and dry.    HEENT:  Hair: No abnormal texture and distribution    Head Shape: normocephalic, atraumatic   Face:  no asymmetry for facial expression  Eyes:  No hypertelorism, no periorbital " edema, no epicanthal folds. No discharge or swelling. No scleral icterus or injection. PERRLA. EOM intact bilaterally without nystagmus.   Ears:  set and rotation within normal limit, no pits or tags  Nose: no apparent dysmorphology found  Mouth: no microretrognathia, no high palate  Neck:  no webbing      Torso:   Chest: No respiratory distress. Breath sounds clear. No widely-spaced nipples. No pectus.   Heart: Warm and well-perfused without cyanosis. No murmur heard. No tachycardia or bradycardia  Abdomen:  Soft and flat. No TTP. No guarding, no rebound pain.   Back:  No CV angle knocking pain.    Ext & Neuro:  Extremities:  No abnormal palmar creasing, no arachnodactyly, no clinodactyly, no syndactyly, no overlapping fingers. No pitting edema.   Neurological: Good head control. Spontaneous movements of arms and legs. Normal tone. No evidence of spasticity. There were no involuntary movements or tremors. Muscle power 5/5 Elbow flex/ext, 5/5 hand , 5/5 knee flex/ext  Psychiatric: Alert and awake. Appropriate response to the exam for the age. Oriented to person, place, and time. No abnormal mood and affect. No abnormal speech and behavior. Judgment and thought content normal.      Lab Results:  2024-02-13  Surgical pathology  A.  PROSTATE, RIGHT POSTERIOR MEDIAL, NEEDLE BIOPSY:  --PROSTATE TISSUE WITH NO EVIDENCE OF MALIGNANCY.     B.  PROSTATE, RIGHT POSTERIOR LATERAL, NEEDLE BIOPSIES:  --ADENOCARCINOMA, ANNELISE PATTERN 3 + 3 = 6, GRADE GROUP 1, INVOLVING 2 OF 2 CORES, AND LESS THAN 5% OF THE SPECIMEN.     C.  PROSTATE, RIGHT BASE, NEEDLE BIOPSY:  --PROSTATE TISSUE WITH NO EVIDENCE OF MALIGNANCY.     D.  PROSTATE, LEFT POSTERIOR MEDIAL, NEEDLE BIOPSIES:  --ADENOCARCINOMA, ANNELISE PATTERN 3 + 4 = 7, GRADE GROUP 2, INVOLVING 2 OF 2 CORES, AND APPROXIMATELY 75% OF THE SPECIMEN. SEE NOTE.     Note: Immunostain for PIN-4 confirms the absence of basal cells in the malignant acini.     E.  PROSTATE, LEFT POSTERIOR  LATERAL, NEEDLE BIOPSIES:  --ADENOCARCINOMA, ANNELISE PATTERN 3 + 4 = 7, GRADE GROUP 2, INVOLVING 2 OF 2 CORES, AND APPROXIMATELY 65% OF THE SPECIMEN. SEE NOTE.     Note: Immunostain for PIN-4 confirms the absence of basal cells in the malignant acini.     F.  PROSTATE, LEFT BASE, NEEDLE BIOPSIES:  --ADENOCARCINOMA, ANNELISE PATTERN 3 + 4 = 7, GRADE GROUP 2, INVOLVING 2 OF 2 CORES, AND APPROXIMATELY 90% OF THE SPECIMEN. SEE NOTE.     Note: Immunostain for PIN-4 confirms the absence of basal cells in the malignant acini.     G.  PROSTATE, RIGHT ANTERIOR, NEEDLE BIOPSIES:  --ADENOCARCINOMA, ANNELISE PATTERN 3 + 3 = 6, GRADE GROUP 1, INVOLVING 1 OF 2 CORES, AND LESS THAN 5% OF THE SPECIMEN.     H.  PROSTATE, LEFT ANTERIOR, NEEDLE BIOPSIES:  --ADENOCARCINOMA, ANNELISE PATTERN 3 + 4 = 7, GRADE GROUP 2, INVOLVING 2 OF 2 CORES, AND APPROXIMATELY 60% OF THE SPECIMEN. SEE NOTE.     Note: Immunostain for PIN-4 confirms the absence of basal cells in the malignant acini    PSA                              Latest Ref Rng      3/15/2022     6/15/2022     12/27/2022     4/3/2023      7/17/2023     3/8/2024     PSA                     0.0 - 4.0 ng/mL     6.70 (H)         6.94 (H)         7.75 (H)           12.76 (H)     9.62 (H)         10.7 (H)       PSA, Free            ng/mL                                                                                                                                   1.0               PSA, Free Pct     %                                                                                                                                          9                    Legend:  (H) High    2023-12-31  NT Pro BNP  <125 pg/mL 276 High     2023-12-31  D Dimer  <500 ng/mL FEU  710 High   D Dimer Age-related Cutoff  ng/mL FEU  740    2023-12-31  CBC  Component  Ref Range & Units 3 mo ago  WBC  3.70 - 11.00 k/uL 10.61  RBC  4.20 - 6.00 m/uL 4.17 Low   Hemoglobin  13.0 - 17.0 g/dL 12.9 Low   Hematocrit  39.0 -  51.0 % 38.5 Low   MCV  80.0 - 100.0 fL 92.3  MCH  26.0 - 34.0 pg 30.9  MCHC  30.5 - 36.0 g/dL 33.5  RDW-CV  11.5 - 15.0 % 14.5  Platelet Count  150 - 400 k/uL 227  MPV  9.0 - 12.7 fL 10.3  Absolute nRBC  <0.01 k/uL <0.01    2023-12-31  COMP METABOLIC PANEL  Component  Ref Range & Units 3 mo ago  Protein, Total  6.3 - 8.0 g/dL 7.6  Albumin  3.9 - 4.9 g/dL 3.6 Low   Calcium, Total  8.5 - 10.2 mg/dL 8.8  Bilirubin, Total  0.2 - 1.3 mg/dL 0.4  Alkaline Phosphatase  38 - 113 U/L 74  AST  14 - 40 U/L 21  ALT  10 - 54 U/L 23  Glucose  74 - 99 mg/dL 98  Comment: The American Diabetes Association (ADA) provides guidance for cutoff values for fasting glucose and random glucose. The ADA defines fasting as no caloric intake for at least 8 hours. Fasting plasma glucose results between 100 to 125 mg/dL indicate increased risk for diabetes (prediabetes).  Fasting plasma glucose results greater than or equal to 126 mg/dL meet the criteria for diagnosis of diabetes. In the absence of unequivocal hyperglycemia, results should be confirmed by repeat testing. In a patient with classic symptoms of hyperglycemia or hyperglycemic crisis, random plasma glucose results greater than or equal to 200 mg/dL meet the criteria for diagnosis of diabetes.  Reference: Standards of Medical Care in Diabetes 2016, American Diabetes Association. Diabetes Care. 2016.39(Suppl 1).  BUN  9 - 24 mg/dL 18  Creatinine  0.73 - 1.22 mg/dL 0.93  Sodium  136 - 144 mmol/L 134 Low   Potassium  3.7 - 5.1 mmol/L 4.2  Chloride  97 - 105 mmol/L 99  CO2  22 - 30 mmol/L 25  Anion Gap  9 - 18 mmol/L 10  Estimated Glomerular Filtration Rate  >=60 mL/min/1.73m² 86  Comment: Estimated Glomerular Filtration Rate (eGFR) is calculated using the 2021 CKD-EPI creatinine equation. This equation utilizes serum creatinine, sex, and age as parameters. The creatinine assay has traceable calibration to isotope dilution-mass spectrometry. Refer to KDIGO guidelines for clinical  interpretation. In patients with unstable renal function, e.g. those with acute kidney injury, the eGFR may not accurately reflect actual GFR.    Diagnostic Imagin-03-21 NM PET CT prostate  IMPRESSION:  1)    Focally increased PSMA expression within the left prostate  gland, consistent with prostate cancer.  2)    There is no evidence for Ga68 PSMA-avid pelvic lymph node  metastasis.  3)    There is no evidence for Ga68 PSMA-avid distant metastatic  disease.        Assessment & Recommendations:    Mao Ordoñez is 72 y/o male with:   - Prostate cancer:   --- progress from [place] 3+3 to now  [place] 3+4 [id number]  --- Now on ADT+R/T    - Family history of multiple prostate cancer: father, 2 brothers  - Family history of early breast cancer: daughter: 46 y/o     - HTN, CAD s/p CABG, 2nd degree AV block    Genetic testing can influence the treatment approach for a patient's cancer. For instance, PARP inhibitors may be used if a patient carries a BRCA pathogenic variant, while Belzutifan (a HIF-2? inhibitor) could be indicated for those with a VHL pathogenic variant. Additionally, specific genes may warrant particular follow-up and surveillance guidelines, or make patients eligible for clinical trials. This information is also vital for other family members.    Plan:  ## Genetic testing: [facility] cancer genetics panel  --- The sample will be collected by saliva kit    We discussed:  - the basics of genetics  - the difference between germline genetics versus somatic genetics  - the potential benefits or information for the patient and for the other family members  - benefits, risks, and alternatives  - CARLI (Genetic Information Nondiscrimination Act)  - possible results of positive, negative and VUS    We discussed the basics of genetics and discussed benefits, risks, and alternatives, including CARLI (Genetic Information Nondiscrimination Act). We discussed possible results, including VUS and incidental  findings.     ## Return to the clinic in 3 months        Attending Physician Statement:        The history was reviewed with the family and is detailed above. I performed a physical examination which is documented above. The impression and plan were reviewed with the patient/family extensively and are documented above. I have reviewed and edited the above note. Greater than 50% of the time was spent on patient counseling and coordination of care.    Prep 5 minutes  Face to face 38 minutes  Additional Patient Care Activities 0 minutes  Documentation 10 minutes  Other 0 minutes  Total 53 minutes    --  Yulia House MD, PhD  Director, Urogenetics Program, Department of Genetics and Genome Sciences  Chief, Urogenetics Division, Urology [place]   and Attending in Genetics and Urology  [facility], [facility], and [facility]

## 2024-04-23 NOTE — LETTER
04/23/24    Shadi Hazel MD  [address]  [place] [address]      Dear Dr. Shadi Hazel MD,    I am writing to confirm that your patient, Mao Ordoñez  received care in my office on 04/23/24. I have enclosed a summary of the care provided to Mao for your reference.    Please contact me with any questions you may have regarding the visit.    Sincerely,         Yulia House MD PhD  [address]  [place] [address]    CC: No Recipients

## 2024-04-23 NOTE — LETTER
04/23/24    Remigio Ceballos MD  [address]  [place] [address]      Dear Dr. Remigio Ceballos MD,    Thank you for referring your patient, Mao Ordoñez, to receive care in my office. I have enclosed a summary of the care provided to Mao on 04/23/24.    Please contact me with any questions you may have regarding the visit.    Sincerely,         Yulia House MD PhD  [address]  [place] [id number]    CC: No Recipients

## 2024-04-24 ENCOUNTER — HOSPITAL ENCOUNTER (OUTPATIENT)
Dept: RADIOLOGY | Facility: HOSPITAL | Age: 74
Discharge: HOME | End: 2024-04-24
Payer: MEDICARE

## 2024-04-24 DIAGNOSIS — C61 PROSTATE CANCER (MULTI): ICD-10-CM

## 2024-04-24 PROCEDURE — 74177 CT ABD & PELVIS W/CONTRAST: CPT

## 2024-04-24 PROCEDURE — 2550000001 HC RX 255 CONTRASTS: Performed by: UROLOGY

## 2024-04-24 PROCEDURE — 74177 CT ABD & PELVIS W/CONTRAST: CPT | Performed by: RADIOLOGY

## 2024-04-24 RX ADMIN — IOHEXOL 75 ML: 350 INJECTION, SOLUTION INTRAVENOUS at 09:42

## 2024-04-26 ENCOUNTER — ANESTHESIA (OUTPATIENT)
Dept: OPERATING ROOM | Facility: CLINIC | Age: 74
End: 2024-04-26
Payer: MEDICARE

## 2024-04-26 ENCOUNTER — ANESTHESIA EVENT (OUTPATIENT)
Dept: OPERATING ROOM | Facility: CLINIC | Age: 74
End: 2024-04-26
Payer: MEDICARE

## 2024-04-26 ENCOUNTER — HOSPITAL ENCOUNTER (OUTPATIENT)
Facility: CLINIC | Age: 74
Setting detail: OUTPATIENT SURGERY
Discharge: HOME | End: 2024-04-26
Attending: UROLOGY | Admitting: UROLOGY
Payer: MEDICARE

## 2024-04-26 VITALS
HEIGHT: 68 IN | WEIGHT: 188.71 LBS | OXYGEN SATURATION: 97 % | RESPIRATION RATE: 12 BRPM | DIASTOLIC BLOOD PRESSURE: 80 MMHG | BODY MASS INDEX: 28.6 KG/M2 | SYSTOLIC BLOOD PRESSURE: 126 MMHG | HEART RATE: 61 BPM | TEMPERATURE: 97.2 F

## 2024-04-26 DIAGNOSIS — C61 PROSTATE CANCER (MULTI): Primary | ICD-10-CM

## 2024-04-26 PROBLEM — N18.9 CHRONIC RENAL INSUFFICIENCY: Status: ACTIVE | Noted: 2024-04-26

## 2024-04-26 PROCEDURE — 7100000002 HC RECOVERY ROOM TIME - EACH INCREMENTAL 1 MINUTE: Performed by: UROLOGY

## 2024-04-26 PROCEDURE — C1889 IMPLANT/INSERT DEVICE, NOC: HCPCS | Performed by: UROLOGY

## 2024-04-26 PROCEDURE — A55876 PR PLACE RADIOTHER DEVICE/MARKER, PROSTATE

## 2024-04-26 PROCEDURE — 55876 PLACE RT DEVICE/MARKER PROS: CPT | Performed by: UROLOGY

## 2024-04-26 PROCEDURE — 3700000001 HC GENERAL ANESTHESIA TIME - INITIAL BASE CHARGE: Performed by: UROLOGY

## 2024-04-26 PROCEDURE — 7100000009 HC PHASE TWO TIME - INITIAL BASE CHARGE: Performed by: UROLOGY

## 2024-04-26 PROCEDURE — A55876 PR PLACE RADIOTHER DEVICE/MARKER, PROSTATE: Performed by: ANESTHESIOLOGY

## 2024-04-26 PROCEDURE — 55874 TPRNL PLMT BIODEGRDABL MATRL: CPT | Performed by: UROLOGY

## 2024-04-26 PROCEDURE — 7100000001 HC RECOVERY ROOM TIME - INITIAL BASE CHARGE: Performed by: UROLOGY

## 2024-04-26 PROCEDURE — 3600000004 HC OR TIME - INITIAL BASE CHARGE - PROCEDURE LEVEL FOUR: Performed by: UROLOGY

## 2024-04-26 PROCEDURE — 76872 US TRANSRECTAL: CPT | Performed by: UROLOGY

## 2024-04-26 PROCEDURE — 99100 ANES PT EXTEME AGE<1 YR&>70: CPT | Performed by: ANESTHESIOLOGY

## 2024-04-26 PROCEDURE — 3700000002 HC GENERAL ANESTHESIA TIME - EACH INCREMENTAL 1 MINUTE: Performed by: UROLOGY

## 2024-04-26 PROCEDURE — 2780000003 HC OR 278 NO HCPCS: Performed by: UROLOGY

## 2024-04-26 PROCEDURE — 2500000004 HC RX 250 GENERAL PHARMACY W/ HCPCS (ALT 636 FOR OP/ED)

## 2024-04-26 PROCEDURE — 7100000010 HC PHASE TWO TIME - EACH INCREMENTAL 1 MINUTE: Performed by: UROLOGY

## 2024-04-26 PROCEDURE — 2500000005 HC RX 250 GENERAL PHARMACY W/O HCPCS

## 2024-04-26 PROCEDURE — 3600000009 HC OR TIME - EACH INCREMENTAL 1 MINUTE - PROCEDURE LEVEL FOUR: Performed by: UROLOGY

## 2024-04-26 DEVICE — IMPLANTABLE DEVICE: Type: IMPLANTABLE DEVICE | Site: PROSTATE | Status: FUNCTIONAL

## 2024-04-26 RX ORDER — LABETALOL HYDROCHLORIDE 5 MG/ML
5 INJECTION, SOLUTION INTRAVENOUS ONCE AS NEEDED
Status: DISCONTINUED | OUTPATIENT
Start: 2024-04-26 | End: 2024-04-26 | Stop reason: HOSPADM

## 2024-04-26 RX ORDER — LIDOCAINE HYDROCHLORIDE 20 MG/ML
INJECTION, SOLUTION INFILTRATION; PERINEURAL AS NEEDED
Status: DISCONTINUED | OUTPATIENT
Start: 2024-04-26 | End: 2024-04-26

## 2024-04-26 RX ORDER — SODIUM CHLORIDE, SODIUM LACTATE, POTASSIUM CHLORIDE, CALCIUM CHLORIDE 600; 310; 30; 20 MG/100ML; MG/100ML; MG/100ML; MG/100ML
100 INJECTION, SOLUTION INTRAVENOUS CONTINUOUS
Status: DISCONTINUED | OUTPATIENT
Start: 2024-04-26 | End: 2024-04-26 | Stop reason: HOSPADM

## 2024-04-26 RX ORDER — FENTANYL CITRATE 50 UG/ML
INJECTION, SOLUTION INTRAMUSCULAR; INTRAVENOUS AS NEEDED
Status: DISCONTINUED | OUTPATIENT
Start: 2024-04-26 | End: 2024-04-26

## 2024-04-26 RX ORDER — ALBUTEROL SULFATE 0.83 MG/ML
2.5 SOLUTION RESPIRATORY (INHALATION) ONCE AS NEEDED
Status: DISCONTINUED | OUTPATIENT
Start: 2024-04-26 | End: 2024-04-26 | Stop reason: HOSPADM

## 2024-04-26 RX ORDER — ONDANSETRON HYDROCHLORIDE 2 MG/ML
4 INJECTION, SOLUTION INTRAVENOUS ONCE AS NEEDED
Status: DISCONTINUED | OUTPATIENT
Start: 2024-04-26 | End: 2024-04-26 | Stop reason: HOSPADM

## 2024-04-26 RX ORDER — CEFAZOLIN 1 G/1
INJECTION, POWDER, FOR SOLUTION INTRAVENOUS AS NEEDED
Status: DISCONTINUED | OUTPATIENT
Start: 2024-04-26 | End: 2024-04-26

## 2024-04-26 RX ORDER — OXYCODONE HYDROCHLORIDE 5 MG/1
5 TABLET ORAL EVERY 4 HOURS PRN
Status: DISCONTINUED | OUTPATIENT
Start: 2024-04-26 | End: 2024-04-26 | Stop reason: HOSPADM

## 2024-04-26 RX ORDER — HYDROMORPHONE HYDROCHLORIDE 1 MG/ML
0.4 INJECTION, SOLUTION INTRAMUSCULAR; INTRAVENOUS; SUBCUTANEOUS EVERY 5 MIN PRN
Status: DISCONTINUED | OUTPATIENT
Start: 2024-04-26 | End: 2024-04-26 | Stop reason: HOSPADM

## 2024-04-26 RX ORDER — LIDOCAINE IN NACL,ISO-OSMOT/PF 30 MG/3 ML
0.1 SYRINGE (ML) INJECTION ONCE
Status: DISCONTINUED | OUTPATIENT
Start: 2024-04-26 | End: 2024-04-26 | Stop reason: HOSPADM

## 2024-04-26 RX ADMIN — LIDOCAINE HYDROCHLORIDE 80 MG: 20 INJECTION, SOLUTION INFILTRATION; PERINEURAL at 10:12

## 2024-04-26 RX ADMIN — SODIUM CHLORIDE, SODIUM LACTATE, POTASSIUM CHLORIDE, AND CALCIUM CHLORIDE: .6; .31; .03; .02 INJECTION, SOLUTION INTRAVENOUS at 10:07

## 2024-04-26 RX ADMIN — FENTANYL CITRATE 50 MCG: 50 INJECTION, SOLUTION INTRAMUSCULAR; INTRAVENOUS at 10:12

## 2024-04-26 RX ADMIN — CEFAZOLIN 2 G: 1 INJECTION, POWDER, FOR SOLUTION INTRAMUSCULAR; INTRAVENOUS at 10:15

## 2024-04-26 ASSESSMENT — PAIN - FUNCTIONAL ASSESSMENT
PAIN_FUNCTIONAL_ASSESSMENT: 0-10

## 2024-04-26 ASSESSMENT — PAIN SCALES - GENERAL
PAINLEVEL_OUTOF10: 0 - NO PAIN

## 2024-04-26 ASSESSMENT — COLUMBIA-SUICIDE SEVERITY RATING SCALE - C-SSRS
6. HAVE YOU EVER DONE ANYTHING, STARTED TO DO ANYTHING, OR PREPARED TO DO ANYTHING TO END YOUR LIFE?: NO
1. IN THE PAST MONTH, HAVE YOU WISHED YOU WERE DEAD OR WISHED YOU COULD GO TO SLEEP AND NOT WAKE UP?: NO
2. HAVE YOU ACTUALLY HAD ANY THOUGHTS OF KILLING YOURSELF?: NO

## 2024-04-26 NOTE — ANESTHESIA POSTPROCEDURE EVALUATION
Patient: Mao Ordoñez    Procedure Summary       Date: 04/26/24 Room / Location: [facility] OR 03 / [facility]    Anesthesia Start: 1008 Anesthesia Stop:     Procedure: SpaceOAR and Prostate Fiducials (Prostate) Diagnosis:       Prostate cancer (Multi)      (Prostate cancer (CMS/HCC) [C61])    Surgeons: Remigio Ceballos MD Responsible Provider: Mikhail Ashby DO    Anesthesia Type: general ASA Status: 3            Anesthesia Type: general    Vitals Value Taken Time   /80 04/26/24 1115   Temp 36.2 °C (97.2 °F) 04/26/24 1115   Pulse 61 04/26/24 1115   Resp 12 04/26/24 1115   SpO2 97 % 04/26/24 1115       Anesthesia Post Evaluation    Patient location during evaluation: PACU  Patient participation: complete - patient participated  Level of consciousness: awake  Pain management: satisfactory to patient  Multimodal analgesia pain management approach  Airway patency: patent  Cardiovascular status: acceptable  Respiratory status: acceptable  Hydration status: acceptable  Postoperative Nausea and Vomiting: none    No notable events documented.

## 2024-04-26 NOTE — ANESTHESIA PREPROCEDURE EVALUATION
Patient: Mao Ordoñez    Procedure Information       Date/Time: 04/26/24 1010    Procedure: SpaceOAR and Prostate Fiducials    Location: [facility] 03 / [facility]    Surgeons: Remigio Ceballos MD            Relevant Problems   Anesthesia (within normal limits)      Cardiac   (+) Acute bilateral thoracic back pain   (+) Chest pain   (+) Coronary artery disease involving native coronary artery of native heart without angina pectoris   (+) Essential hypertension   (+) Mixed hyperlipidemia   (+) Other chest pain   (+) Second degree AV block      Pulmonary (within normal limits)      Neuro   (+) Neuropathy, cervical (radicular)      GI   (+) Dysphagia      /Renal   (+) BPH (benign prostatic hyperplasia)   (+) Chronic renal insufficiency   (+) Prostate cancer (Multi)      Endocrine   (+) Acquired hypothyroidism      Musculoskeletal   (+) Cervical spondylosis with myelopathy   (+) Myelopathy concurrent with and due to spinal stenosis of cervical region (Multi)   (+) Spondylosis of cervical spine without myelopathy      HEENT   (+) Asymmetrical hearing loss   (+) High frequency sensorineural hearing loss of left ear       Clinical information reviewed:   Tobacco  Allergies  Meds   Med Hx  Surg Hx   Fam Hx  Soc Hx        NPO Detail:  NPO/Void Status  Date of Last Liquid: 04/26/24  Time of Last Liquid: 0700  Date of Last Solid: 04/25/24  Time of Last Solid: 2200  Time of Last Void: 0800         Physical Exam    Airway  Mallampati: I  TM distance: >3 FB  Neck ROM: full     Cardiovascular    Dental - normal exam     Pulmonary    Abdominal        Anesthesia Plan    History of general anesthesia?: yes  History of complications of general anesthesia?: no    ASA 3     general     intravenous induction   Anesthetic plan and risks discussed with patient.    Plan discussed with CAA.

## 2024-04-26 NOTE — ANESTHESIA PROCEDURE NOTES
Airway  Date/Time: 4/26/2024 10:13 AM  Urgency: elective    Airway not difficult    Staffing  Performed: CAA   Authorized by: Mikhail Ashby DO    Performed by: JEM Sterling  Patient location during procedure: OR    Indications and Patient Condition  Indications for airway management: anesthesia and airway protection  Spontaneous ventilation: present  Sedation level: deep  Preoxygenated: yes  Patient position: sniffing  Mask difficulty assessment: 1 - vent by mask    Final Airway Details  Final airway type: supraglottic airway      Successful airway: Supraglottic airway: i-gel.  Size 5     Number of attempts at approach: 1

## 2024-04-26 NOTE — OP NOTE
SpaceOAR and Prostate Fiducials Operative Note     Date: 2024  OR Location: [facility] [place] [facility] OR    Name: Mao Ordoñez, : 1950, Age: 73 y.o., MRN: [id number], Sex: male    Diagnosis  Pre-op Diagnosis     * Prostate cancer (Multi) [C61] Post-op Diagnosis     * Prostate cancer (Multi) [C61]     Procedures  SpaceOAR and Prostate Fiducials  12284 - OH PLMT INTERSTITIAL DEV RADIAT TX PROSTATE 1/MULT    OH TRANSPERINEAL PLMT BIODEGRADABLE MATRL 1/MLT NJX [63838]  Surgeons      * Remigio Ceballos - Primary    Resident/Fellow/Other Assistant:  Surgeons and Role:  * No surgeons found with a matching role *    Procedure Summary  Anesthesia: General  ASA: III  Anesthesia Staff: Anesthesiologist: Mikhail Ashby DO  C-AA: JEM Sterling  Estimated Blood Loss: 3mL  Intra-op Medications: Administrations occurring from 1010 to 1050 on 24:  * No intraprocedure medications in log *           Anesthesia Record               Intraprocedure I/O Totals          Intake    Propofol Drip 0.00 mL    The total shown is the total volume documented since Anesthesia Start was filed.    Total Intake 0 mL          Specimen: No specimens collected     Staff:   Circulator: Dustin Recinos RN  Scrub Person: Esteban Obrien RN         Drains and/or Catheters: * None in log *    Tourniquet Times:         Operative Indications: 73 year old male w/ dx of prostate cancer who has elected to undergo radiation for primary tratment of the prostate. He presents today for spaceOAR and fiducial placement. The patient was counseled regarding the risks, benefits, alternatives, equipment, and personnel involved and consented to proceed with surgical intervention.    Operative Findings: Uncomplicated fiducial placement and SpaceOAR  Placement.    Operative Procedure: The patient was correctly identified and the operative plan was confirmed with the patient and the operative team. A weight appropriate dose of prophylactic antibiotics was  administered intravenously prior to the procedure and sequential compression devices were applied to the lower extremities and activated prior to induction of anesthesia. The patient was placed in supine position. Anesthesia was induced. The patient was repositioned in dorsal lithotomy position. All pressure points were padded per protocol.    The operative area was then prepped and draped in the usual sterile fashion.The transrectal ultrasound probe was inserted into the rectum using the stepper device.  Civco coupled fiducials were placed on the right spanning from base to apex, lateral to the urethra.  On the left, single fiducial was placed mid gland lateral to the urethra.  In total, 3 fiducials were placed.     Next the SpaceOAR was placed.  The needle provided in the [employer] SpaceOAR kit was used to carefully navigate into the prerectal space.  Hydrodistention was used to confirm entry within the space.  We utilized sagittal as well as transverse ultrasound imaging of the prostate to ensure that we are in the midline with good lateral and vertical mobility.  Once we confirm we are in a good place in the mid gland, we deployed the SpaceOAR gel carefully.  It was excellent dispersal of the SpaceOAR gel which provided good spacing from base to apex as well as laterally.    The perineal skin was infiltrated with 5 cc of 0.25% Marcaine plain on both sides of the perineal raphae.      The patient tolerated the procedure well, emerged from anesthesia without incident, and was transferred to PACU in stable condition. NICHOLAS (Remigio Ceballos MD) performed the procedure    Remigio Ceballos  Phone Number: [phone]

## 2024-04-26 NOTE — H&P
History Of Present Illness  73-year-old male with prostate cancer here for SpaceOAR and fiducials.  No recent change in health    Past Medical History  Past Medical History:   Diagnosis Date    Heart disease     Hyperlipidemia     Hypertension     Hypothyroidism         Surgical History  Past Surgical History:   Procedure Laterality Date    CORONARY ARTERY BYPASS GRAFT      5 vessels    CT ANGIO NECK  06/17/2023    CT NECK ANGIO W AND WO IV CONTRAST 6/17/2023    CT HEAD ANGIO W AND WO IV CONTRAST  06/17/2023    CT HEAD ANGIO W AND WO IV CONTRAST 6/17/2023    HERNIA REPAIR      MENISCECTOMY      OTHER SURGICAL HISTORY  08/06/2019    Bypass    OTHER SURGICAL HISTORY  05/27/2020    Hernia repair    OTHER SURGICAL HISTORY  05/27/2020    Vasectomy    OTHER SURGICAL HISTORY  04/16/2020    Colonoscopy        Social History  He reports that he has never smoked. He has never been exposed to tobacco smoke. He has never used smokeless tobacco. He reports current alcohol use. He reports that he does not use drugs.    Family History  Family History   Problem Relation Name Age of Onset    Hypertension Mother      Hyperlipidemia Father      Hypothyroidism Father      Hyperlipidemia Other sibling     Hypothyroidism Other sibling         Allergies  Vancomycin    ROS: 12 system review was completed and is negative with the exception of those signs and symptoms noted in the history of present illness: A 12 system review was completed and is negative with the exception of those signs and symptoms noted in the history of present illness.     Exam:  General: in NAD, appears stated age  Head: normocephalic, atraumatic  Respiratory: normal effort, no use of accessory muscles  Cardiovascular: no edema noted  Skin: normal turgor, no rashes  Neurologic: grossly intact, oriented to person/place/time  Psychiatric: mode and affect appropriate    Per anesthesiology, clear to auscultation bilaterally with a regular rate and rhythm     Last  "Recorded Vitals  /88   Pulse 66   Temp 36.6 °C (97.9 °F) (Temporal)   Resp 16   Ht 1.727 m (5' 8\")   Wt 85.6 kg (188 lb 11.4 oz)   SpO2 96%   BMI 28.69 kg/m²       No results found for: \"URINECULTURE\", \"CREATININE\"      ASSESSMENT/PLAN:  Okay to proceed with SpaceOAR and fiducials    Remigio Ceballos MD    "

## 2024-04-26 NOTE — DISCHARGE INSTRUCTIONS
Post-Procedure Instructions for SpaceOAR and Fiducial Marker Placement    Procedure Overview  You have had transperineal SpaceOAR and Fiducial Marker placement.     What to Expect:  Blood-tinged urine with/without clots for 24-72 hours.   Blood in the ejaculate for 4-6 weeks.    When to Call  Call your doctor immediately if you experience any of the following:   You are unable to urinate in 6-8 hours after the procedure.   Fever above 101 degrees (F) or Chills  Passing excessive clots in urine.    Pain control  Tylenol should be adequate.    General Instructions:  Resume normal diet.   Drink 6-8 glasses of water the rest of the day to dilute the urine and to prevent clot formation.   No alcoholic beverages for 24 hours.   No straining or heavy lifting for 5 days.    DR. MATA'S CONTACT INFORMATION  For non-urgent issues, please send our office a message via [employer], this is often easier and more convenient for you than calling the office. You should have received an invitation to sign-up for [employer] in your email upon registration.    For appointments:  [phone], option 1 -> option 3 -> option 9    For questions/issues/concerns during business hours: [phone] - this number will direct you to the voicemail for Dr. Mata's , which is frequently checked during business hours.     For after-hours issues:  [phone], this will direct you to our answering service or the resident physician on call if needed.

## 2024-04-29 ENCOUNTER — TELEPHONE (OUTPATIENT)
Dept: RADIATION ONCOLOGY | Facility: CLINIC | Age: 74
End: 2024-04-29
Payer: MEDICARE

## 2024-04-29 NOTE — TELEPHONE ENCOUNTER
2024    Called  Mao Ordoñez  to discuss education for upcoming CT Simulation that is scheduled for May 3, 2024 @ 10:00 am  Verified patient name/.     Reviewed the following:     Bowel Preparation   The goal is to have 1 complete bowel movement each day.  If you do not have 1 complete bowel movement each day, try and get into this routine at least 2 days before your CT Simulation appointment.  Continue this routine throughout your treatment.     Tips and Tricks to have 1 complete bowel movement a day:  - Over-the-counter stool softeners such as Colace  - Over-the-counter laxatives such as Miralax or Senna  - Prunes or prune juice    Bladder Preparation Goal  Finish drinking 20 ounces of water 45 minutes before your CT Simulation appointment and each radiation therapy treatment.  Your bladder should be comfortably full at time of CT Simulation and for each radiation therapy treatment.        Mao verbalized understanding of all instructions.  Denied further questions at this time.     Department phone number given: [phone].  Encouraged to call and ask to speak with a radiation RN if any questions present.      Casie Behrend, RN

## 2024-05-02 ENCOUNTER — TELEPHONE (OUTPATIENT)
Dept: GENETICS | Facility: CLINIC | Age: 74
End: 2024-05-02
Payer: MEDICARE

## 2024-05-02 DIAGNOSIS — C61 PROSTATE CANCER (MULTI): Primary | ICD-10-CM

## 2024-05-02 NOTE — TELEPHONE ENCOUNTER
I spoke with the patient this afternoon to share a new saliva kit had been requested from the [facility]. The patient confirmed the saliva kit was received today.

## 2024-05-03 ENCOUNTER — HOSPITAL ENCOUNTER (OUTPATIENT)
Dept: RADIOLOGY | Facility: EXTERNAL LOCATION | Age: 74
Discharge: HOME | End: 2024-05-03

## 2024-05-03 ENCOUNTER — HOSPITAL ENCOUNTER (OUTPATIENT)
Dept: RADIATION ONCOLOGY | Facility: CLINIC | Age: 74
Setting detail: RADIATION/ONCOLOGY SERIES
Discharge: HOME | End: 2024-05-03
Payer: MEDICARE

## 2024-05-03 DIAGNOSIS — C61 MALIGNANT NEOPLASM OF PROSTATE (MULTI): ICD-10-CM

## 2024-05-03 PROCEDURE — 77263 THER RADIOLOGY TX PLNG CPLX: CPT | Performed by: RADIOLOGY

## 2024-05-03 PROCEDURE — 77334 RADIATION TREATMENT AID(S): CPT | Performed by: RADIOLOGY

## 2024-05-09 ENCOUNTER — HOSPITAL ENCOUNTER (OUTPATIENT)
Dept: RADIATION ONCOLOGY | Facility: CLINIC | Age: 74
Setting detail: RADIATION/ONCOLOGY SERIES
Discharge: HOME | End: 2024-05-09
Payer: MEDICARE

## 2024-05-09 PROCEDURE — 77338 DESIGN MLC DEVICE FOR IMRT: CPT | Performed by: RADIOLOGY

## 2024-05-09 PROCEDURE — 77301 RADIOTHERAPY DOSE PLAN IMRT: CPT | Performed by: RADIOLOGY

## 2024-05-09 PROCEDURE — 77300 RADIATION THERAPY DOSE PLAN: CPT | Performed by: RADIOLOGY

## 2024-05-16 ENCOUNTER — APPOINTMENT (OUTPATIENT)
Dept: RADIATION ONCOLOGY | Facility: CLINIC | Age: 74
End: 2024-05-16
Payer: MEDICARE

## 2024-05-17 ENCOUNTER — APPOINTMENT (OUTPATIENT)
Dept: RADIATION ONCOLOGY | Facility: CLINIC | Age: 74
End: 2024-05-17
Payer: MEDICARE

## 2024-05-20 ENCOUNTER — HOSPITAL ENCOUNTER (OUTPATIENT)
Dept: RADIATION ONCOLOGY | Facility: CLINIC | Age: 74
Setting detail: RADIATION/ONCOLOGY SERIES
Discharge: HOME | End: 2024-05-20
Payer: MEDICARE

## 2024-05-20 DIAGNOSIS — C61 MALIGNANT NEOPLASM OF PROSTATE (MULTI): ICD-10-CM

## 2024-05-20 LAB
RAD ONC MSQ ACTUAL FRACTIONS DELIVERED: 1
RAD ONC MSQ ACTUAL SESSION DELIVERED DOSE: 300 CGRAY
RAD ONC MSQ ACTUAL TOTAL DOSE: 300 CGRAY
RAD ONC MSQ ELAPSED DAYS: 0
RAD ONC MSQ LAST DATE: NORMAL
RAD ONC MSQ PRESCRIBED FRACTIONAL DOSE: 300 CGRAY
RAD ONC MSQ PRESCRIBED NUMBER OF FRACTIONS: 20
RAD ONC MSQ PRESCRIBED TECHNIQUE: NORMAL
RAD ONC MSQ PRESCRIBED TOTAL DOSE: 6000 CGRAY
RAD ONC MSQ PRESCRIPTION PATTERN COMMENT: NORMAL
RAD ONC MSQ START DATE: NORMAL
RAD ONC MSQ TREATMENT COURSE NUMBER: 1
RAD ONC MSQ TREATMENT SITE: NORMAL

## 2024-05-20 PROCEDURE — 77385 HC INTENSITY-MODULATED RADIATION THERAPY (IMRT), SIMPLE: CPT | Performed by: RADIOLOGY

## 2024-05-20 PROCEDURE — 77014 CHG CT GUIDANCE RADIATION THERAPY FLDS PLACEMENT: CPT | Performed by: RADIOLOGY

## 2024-05-21 ENCOUNTER — HOSPITAL ENCOUNTER (OUTPATIENT)
Dept: RADIATION ONCOLOGY | Facility: CLINIC | Age: 74
Setting detail: RADIATION/ONCOLOGY SERIES
Discharge: HOME | End: 2024-05-21
Payer: MEDICARE

## 2024-05-21 DIAGNOSIS — C61 MALIGNANT NEOPLASM OF PROSTATE (MULTI): ICD-10-CM

## 2024-05-21 DIAGNOSIS — Z51.0 ENCOUNTER FOR ANTINEOPLASTIC RADIATION THERAPY: ICD-10-CM

## 2024-05-21 LAB
RAD ONC MSQ ACTUAL FRACTIONS DELIVERED: 2
RAD ONC MSQ ACTUAL SESSION DELIVERED DOSE: 300 CGRAY
RAD ONC MSQ ACTUAL TOTAL DOSE: 600 CGRAY
RAD ONC MSQ ELAPSED DAYS: 1
RAD ONC MSQ LAST DATE: NORMAL
RAD ONC MSQ PRESCRIBED FRACTIONAL DOSE: 300 CGRAY
RAD ONC MSQ PRESCRIBED NUMBER OF FRACTIONS: 20
RAD ONC MSQ PRESCRIBED TECHNIQUE: NORMAL
RAD ONC MSQ PRESCRIBED TOTAL DOSE: 6000 CGRAY
RAD ONC MSQ PRESCRIPTION PATTERN COMMENT: NORMAL
RAD ONC MSQ START DATE: NORMAL
RAD ONC MSQ TREATMENT COURSE NUMBER: 1
RAD ONC MSQ TREATMENT SITE: NORMAL

## 2024-05-21 PROCEDURE — 77014 CHG CT GUIDANCE RADIATION THERAPY FLDS PLACEMENT: CPT | Performed by: RADIOLOGY

## 2024-05-21 PROCEDURE — 77385 HC INTENSITY-MODULATED RADIATION THERAPY (IMRT), SIMPLE: CPT | Performed by: RADIOLOGY

## 2024-05-22 ENCOUNTER — RADIATION ONCOLOGY OTV (OUTPATIENT)
Dept: RADIATION ONCOLOGY | Facility: CLINIC | Age: 74
End: 2024-05-22
Payer: MEDICARE

## 2024-05-22 ENCOUNTER — HOSPITAL ENCOUNTER (OUTPATIENT)
Dept: RADIATION ONCOLOGY | Facility: CLINIC | Age: 74
Setting detail: RADIATION/ONCOLOGY SERIES
Discharge: HOME | End: 2024-05-22
Payer: MEDICARE

## 2024-05-22 VITALS — TEMPERATURE: 97 F | BODY MASS INDEX: 28.53 KG/M2 | WEIGHT: 187.61 LBS

## 2024-05-22 DIAGNOSIS — C61 MALIGNANT NEOPLASM OF PROSTATE (MULTI): Primary | ICD-10-CM

## 2024-05-22 DIAGNOSIS — Z51.0 ENCOUNTER FOR ANTINEOPLASTIC RADIATION THERAPY: ICD-10-CM

## 2024-05-22 DIAGNOSIS — C61 MALIGNANT NEOPLASM OF PROSTATE (MULTI): ICD-10-CM

## 2024-05-22 LAB
RAD ONC MSQ ACTUAL FRACTIONS DELIVERED: 3
RAD ONC MSQ ACTUAL SESSION DELIVERED DOSE: 300 CGRAY
RAD ONC MSQ ACTUAL TOTAL DOSE: 900 CGRAY
RAD ONC MSQ ELAPSED DAYS: 2
RAD ONC MSQ LAST DATE: NORMAL
RAD ONC MSQ PRESCRIBED FRACTIONAL DOSE: 300 CGRAY
RAD ONC MSQ PRESCRIBED NUMBER OF FRACTIONS: 20
RAD ONC MSQ PRESCRIBED TECHNIQUE: NORMAL
RAD ONC MSQ PRESCRIBED TOTAL DOSE: 6000 CGRAY
RAD ONC MSQ PRESCRIPTION PATTERN COMMENT: NORMAL
RAD ONC MSQ START DATE: NORMAL
RAD ONC MSQ TREATMENT COURSE NUMBER: 1
RAD ONC MSQ TREATMENT SITE: NORMAL

## 2024-05-22 PROCEDURE — 77385 HC INTENSITY-MODULATED RADIATION THERAPY (IMRT), SIMPLE: CPT | Performed by: RADIOLOGY

## 2024-05-22 PROCEDURE — 77427 RADIATION TX MANAGEMENT X5: CPT | Performed by: RADIOLOGY

## 2024-05-22 PROCEDURE — 77336 RADIATION PHYSICS CONSULT: CPT | Performed by: RADIOLOGY

## 2024-05-22 PROCEDURE — 77014 CHG CT GUIDANCE RADIATION THERAPY FLDS PLACEMENT: CPT | Performed by: RADIOLOGY

## 2024-05-22 RX ORDER — TAMSULOSIN HYDROCHLORIDE 0.4 MG/1
0.4 CAPSULE ORAL DAILY
Qty: 30 CAPSULE | Refills: 11 | Status: SHIPPED | OUTPATIENT
Start: 2024-05-22 | End: 2025-05-17

## 2024-05-22 ASSESSMENT — PAIN SCALES - GENERAL: PAINLEVEL: 0-NO PAIN

## 2024-05-22 NOTE — PROGRESS NOTES
Radiation Oncology On Treatment Visit    Patient Name:  Mao Ordoñez  MRN:  [id number]  :  1950    Referring Provider: No ref. provider found  Primary Care Provider: Shadi Hazel MD  Care Team: Patient Care Team:  Shadi Hazel MD as PCP - General  Shadi Hazel MD as PCP - MSSP ACO Attributed Provider    Date of Service: 2024     Diagnosis:   Specialty Problems          Radiation Oncology Problems    Prostate cancer (Multi)         Treatment Summary:  IMRT: Prostate    Treatment Period Technique Fraction Dose Fractions Total Dose   Course 1 2024-2024  (days elapsed: 2)         Prostate+SV 2024-2024 VMAT 300 / 300 cGy 3 / 20 900 / 6,000 cGy     SUBJECTIVE: First week of treatment. Taking colace twice a day, 1 BM per day. New onset  of some straining to begin urination, urgency present intermittently. Hot flashes       OBJECTIVE:   Vital Signs:  Temp 36.1 °C (97 °F) (Temporal)   Wt 85.1 kg (187 lb 9.8 oz)   BMI 28.53 kg/m²    Pain Scale: The patient's current pain level was assessed.  They report currently having a pain of 0 out of 10.    Other Pertinent Findings:     Toxicity Assessment          2024    10:00   Toxicity Assessment   Adverse Events Reviewed (WDL) Yes (Within Defined Limits)   Treatment Site Pelvis - male   Anorexia Grade 0   Dehydration Grade 0   Dermatitis Radiation Grade 0   Diarrhea Grade 0   Fatigue Grade 0   Nausea Grade 0   Pain Grade 0   Abdominal Pain Grade 0   Bloating Grade 0   Constipation Grade 0   Fecal Incontinence Grade 0   Hematuria Grade 0   Urinary Incontinence Grade 0   Urinary Frequency Grade 0   Urinary Retention Grade 0   Urinary Tract Obstruction Grade 0       some straining to begin urination   Urinary Tract Pain Grade 0   Urinary Urgency Grade 1       intermittent        Assessment / Plan:  The patient is tolerating radiation therapy as anticipated.  Continue per current treatment plan.

## 2024-05-23 ENCOUNTER — HOSPITAL ENCOUNTER (OUTPATIENT)
Dept: RADIATION ONCOLOGY | Facility: CLINIC | Age: 74
Setting detail: RADIATION/ONCOLOGY SERIES
Discharge: HOME | End: 2024-05-23
Payer: MEDICARE

## 2024-05-23 DIAGNOSIS — C61 MALIGNANT NEOPLASM OF PROSTATE (MULTI): ICD-10-CM

## 2024-05-23 DIAGNOSIS — Z51.0 ENCOUNTER FOR ANTINEOPLASTIC RADIATION THERAPY: ICD-10-CM

## 2024-05-23 LAB
RAD ONC MSQ ACTUAL FRACTIONS DELIVERED: 4
RAD ONC MSQ ACTUAL SESSION DELIVERED DOSE: 300 CGRAY
RAD ONC MSQ ACTUAL TOTAL DOSE: 1200 CGRAY
RAD ONC MSQ ELAPSED DAYS: 3
RAD ONC MSQ LAST DATE: NORMAL
RAD ONC MSQ PRESCRIBED FRACTIONAL DOSE: 300 CGRAY
RAD ONC MSQ PRESCRIBED NUMBER OF FRACTIONS: 20
RAD ONC MSQ PRESCRIBED TECHNIQUE: NORMAL
RAD ONC MSQ PRESCRIBED TOTAL DOSE: 6000 CGRAY
RAD ONC MSQ PRESCRIPTION PATTERN COMMENT: NORMAL
RAD ONC MSQ START DATE: NORMAL
RAD ONC MSQ TREATMENT COURSE NUMBER: 1
RAD ONC MSQ TREATMENT SITE: NORMAL

## 2024-05-23 PROCEDURE — 77385 HC INTENSITY-MODULATED RADIATION THERAPY (IMRT), SIMPLE: CPT | Performed by: RADIOLOGY

## 2024-05-23 PROCEDURE — 77014 CHG CT GUIDANCE RADIATION THERAPY FLDS PLACEMENT: CPT | Performed by: RADIOLOGY

## 2024-05-24 ENCOUNTER — HOSPITAL ENCOUNTER (OUTPATIENT)
Dept: RADIATION ONCOLOGY | Facility: CLINIC | Age: 74
Setting detail: RADIATION/ONCOLOGY SERIES
Discharge: HOME | End: 2024-05-24
Payer: MEDICARE

## 2024-05-24 DIAGNOSIS — C61 MALIGNANT NEOPLASM OF PROSTATE (MULTI): ICD-10-CM

## 2024-05-24 DIAGNOSIS — Z51.0 ENCOUNTER FOR ANTINEOPLASTIC RADIATION THERAPY: ICD-10-CM

## 2024-05-24 LAB
RAD ONC MSQ ACTUAL FRACTIONS DELIVERED: 5
RAD ONC MSQ ACTUAL SESSION DELIVERED DOSE: 300 CGRAY
RAD ONC MSQ ACTUAL TOTAL DOSE: 1500 CGRAY
RAD ONC MSQ ELAPSED DAYS: 4
RAD ONC MSQ LAST DATE: NORMAL
RAD ONC MSQ PRESCRIBED FRACTIONAL DOSE: 300 CGRAY
RAD ONC MSQ PRESCRIBED NUMBER OF FRACTIONS: 20
RAD ONC MSQ PRESCRIBED TECHNIQUE: NORMAL
RAD ONC MSQ PRESCRIBED TOTAL DOSE: 6000 CGRAY
RAD ONC MSQ PRESCRIPTION PATTERN COMMENT: NORMAL
RAD ONC MSQ START DATE: NORMAL
RAD ONC MSQ TREATMENT COURSE NUMBER: 1
RAD ONC MSQ TREATMENT SITE: NORMAL

## 2024-05-24 PROCEDURE — 77385 HC INTENSITY-MODULATED RADIATION THERAPY (IMRT), SIMPLE: CPT | Performed by: RADIOLOGY

## 2024-05-24 PROCEDURE — 77014 CHG CT GUIDANCE RADIATION THERAPY FLDS PLACEMENT: CPT | Performed by: RADIOLOGY

## 2024-05-28 ENCOUNTER — HOSPITAL ENCOUNTER (OUTPATIENT)
Dept: RADIATION ONCOLOGY | Facility: CLINIC | Age: 74
Setting detail: RADIATION/ONCOLOGY SERIES
Discharge: HOME | End: 2024-05-28
Payer: MEDICARE

## 2024-05-28 DIAGNOSIS — C61 MALIGNANT NEOPLASM OF PROSTATE (MULTI): ICD-10-CM

## 2024-05-28 DIAGNOSIS — Z51.0 ENCOUNTER FOR ANTINEOPLASTIC RADIATION THERAPY: ICD-10-CM

## 2024-05-28 LAB
RAD ONC MSQ ACTUAL FRACTIONS DELIVERED: 6
RAD ONC MSQ ACTUAL SESSION DELIVERED DOSE: 300 CGRAY
RAD ONC MSQ ACTUAL TOTAL DOSE: 1800 CGRAY
RAD ONC MSQ ELAPSED DAYS: 8
RAD ONC MSQ LAST DATE: NORMAL
RAD ONC MSQ PRESCRIBED FRACTIONAL DOSE: 300 CGRAY
RAD ONC MSQ PRESCRIBED NUMBER OF FRACTIONS: 20
RAD ONC MSQ PRESCRIBED TECHNIQUE: NORMAL
RAD ONC MSQ PRESCRIBED TOTAL DOSE: 6000 CGRAY
RAD ONC MSQ PRESCRIPTION PATTERN COMMENT: NORMAL
RAD ONC MSQ START DATE: NORMAL
RAD ONC MSQ TREATMENT COURSE NUMBER: 1
RAD ONC MSQ TREATMENT SITE: NORMAL

## 2024-05-28 PROCEDURE — 77014 CHG CT GUIDANCE RADIATION THERAPY FLDS PLACEMENT: CPT | Performed by: RADIOLOGY

## 2024-05-28 PROCEDURE — 77385 HC INTENSITY-MODULATED RADIATION THERAPY (IMRT), SIMPLE: CPT | Performed by: RADIOLOGY

## 2024-05-29 ENCOUNTER — HOSPITAL ENCOUNTER (OUTPATIENT)
Dept: RADIATION ONCOLOGY | Facility: CLINIC | Age: 74
Setting detail: RADIATION/ONCOLOGY SERIES
Discharge: HOME | End: 2024-05-29
Payer: MEDICARE

## 2024-05-29 ENCOUNTER — RADIATION ONCOLOGY OTV (OUTPATIENT)
Dept: RADIATION ONCOLOGY | Facility: CLINIC | Age: 74
End: 2024-05-29
Payer: MEDICARE

## 2024-05-29 VITALS — BODY MASS INDEX: 29.36 KG/M2 | TEMPERATURE: 97 F | WEIGHT: 193.12 LBS

## 2024-05-29 DIAGNOSIS — C61 MALIGNANT NEOPLASM OF PROSTATE (MULTI): ICD-10-CM

## 2024-05-29 DIAGNOSIS — Z51.0 ENCOUNTER FOR ANTINEOPLASTIC RADIATION THERAPY: ICD-10-CM

## 2024-05-29 LAB
RAD ONC MSQ ACTUAL FRACTIONS DELIVERED: 7
RAD ONC MSQ ACTUAL SESSION DELIVERED DOSE: 300 CGRAY
RAD ONC MSQ ACTUAL TOTAL DOSE: 2100 CGRAY
RAD ONC MSQ ELAPSED DAYS: 9
RAD ONC MSQ LAST DATE: NORMAL
RAD ONC MSQ PRESCRIBED FRACTIONAL DOSE: 300 CGRAY
RAD ONC MSQ PRESCRIBED NUMBER OF FRACTIONS: 20
RAD ONC MSQ PRESCRIBED TECHNIQUE: NORMAL
RAD ONC MSQ PRESCRIBED TOTAL DOSE: 6000 CGRAY
RAD ONC MSQ PRESCRIPTION PATTERN COMMENT: NORMAL
RAD ONC MSQ START DATE: NORMAL
RAD ONC MSQ TREATMENT COURSE NUMBER: 1
RAD ONC MSQ TREATMENT SITE: NORMAL

## 2024-05-29 PROCEDURE — 77014 CHG CT GUIDANCE RADIATION THERAPY FLDS PLACEMENT: CPT | Performed by: RADIOLOGY

## 2024-05-29 PROCEDURE — 77385 HC INTENSITY-MODULATED RADIATION THERAPY (IMRT), SIMPLE: CPT | Performed by: RADIOLOGY

## 2024-05-29 PROCEDURE — 77336 RADIATION PHYSICS CONSULT: CPT | Performed by: RADIOLOGY

## 2024-05-29 PROCEDURE — 77427 RADIATION TX MANAGEMENT X5: CPT | Performed by: RADIOLOGY

## 2024-05-29 ASSESSMENT — PAIN SCALES - GENERAL: PAINLEVEL: 0-NO PAIN

## 2024-05-29 NOTE — PROGRESS NOTES
Radiation Oncology On Treatment Visit    Patient Name:  Mao Ordoñez  MRN:  [id number]  :  1950    Referring Provider: No ref. provider found  Primary Care Provider: Shadi Hazel MD  Care Team: Patient Care Team:  Shadi Hazel MD as PCP - General  Shadi Hazel MD as PCP - MSSP ACO Attributed Provider    Date of Service: 2024     Diagnosis:   Specialty Problems          Radiation Oncology Problems    Prostate cancer (Multi)         Treatment Summary:  IMRT: Prostate    Treatment Period Technique Fraction Dose Fractions Total Dose   Course 1 2024-2024  (days elapsed: 9)         Prostate+SV 2024-2024 VMAT 300 / 300 cGy  2100 / 6,000 cGy     SUBJECTIVE: Patient denies any pain, dysuria, hematuria, or urinary straining.  States frequency and urgency have increased since from his baseline since the start of treatment. Nocturia x5-6.  Flomax has been prescribed though patient has not started taking it yet. Encouraging patient to start flomax and education on medication provided.       OBJECTIVE:   Vital Signs:  Temp 36.1 °C (97 °F) (Temporal)   Wt 87.6 kg (193 lb 2 oz)   BMI 29.36 kg/m²    Pain Scale: The patient's current pain level was assessed.  They report currently having a pain of 0 out of 10.    Other Pertinent Findings:     Toxicity Assessment          2024    10:00 2024    09:31   Toxicity Assessment   Adverse Events Reviewed (WDL) Yes (Within Defined Limits) Yes (Within Defined Limits)   Treatment Site Pelvis - male Pelvis - male   Anorexia Grade 0 Grade 0   Dehydration Grade 0 Grade 0   Dermatitis Radiation Grade 0 Grade 0   Diarrhea Grade 0 Grade 0   Fatigue Grade 0 Grade 1   Nausea Grade 0 Grade 0   Pain Grade 0 Grade 0   Abdominal Pain Grade 0 Grade 0   Bloating Grade 0    Constipation Grade 0 Grade 0   Fecal Incontinence Grade 0    Hematuria Grade 0 Grade 0   Urinary Incontinence Grade 0 Grade 0   Urinary Frequency Grade 0 Grade 1       Nocturia  x5-6   Urinary Retention Grade 0 Grade 1   Urinary Tract Obstruction Grade 0       some straining to begin urination Grade 0   Urinary Tract Pain Grade 0 Grade 0   Urinary Urgency Grade 1       intermittent Grade 1        Assessment / Plan:  The patient is tolerating radiation therapy as anticipated.  Continue per current treatment plan.

## 2024-05-30 ENCOUNTER — HOSPITAL ENCOUNTER (OUTPATIENT)
Dept: RADIATION ONCOLOGY | Facility: CLINIC | Age: 74
Setting detail: RADIATION/ONCOLOGY SERIES
Discharge: HOME | End: 2024-05-30
Payer: MEDICARE

## 2024-05-30 DIAGNOSIS — Z51.0 ENCOUNTER FOR ANTINEOPLASTIC RADIATION THERAPY: ICD-10-CM

## 2024-05-30 DIAGNOSIS — C61 MALIGNANT NEOPLASM OF PROSTATE (MULTI): ICD-10-CM

## 2024-05-30 LAB
RAD ONC MSQ ACTUAL FRACTIONS DELIVERED: 8
RAD ONC MSQ ACTUAL SESSION DELIVERED DOSE: 300 CGRAY
RAD ONC MSQ ACTUAL TOTAL DOSE: 2400 CGRAY
RAD ONC MSQ ELAPSED DAYS: 10
RAD ONC MSQ LAST DATE: NORMAL
RAD ONC MSQ PRESCRIBED FRACTIONAL DOSE: 300 CGRAY
RAD ONC MSQ PRESCRIBED NUMBER OF FRACTIONS: 20
RAD ONC MSQ PRESCRIBED TECHNIQUE: NORMAL
RAD ONC MSQ PRESCRIBED TOTAL DOSE: 6000 CGRAY
RAD ONC MSQ PRESCRIPTION PATTERN COMMENT: NORMAL
RAD ONC MSQ START DATE: NORMAL
RAD ONC MSQ TREATMENT COURSE NUMBER: 1
RAD ONC MSQ TREATMENT SITE: NORMAL

## 2024-05-30 PROCEDURE — 77014 CHG CT GUIDANCE RADIATION THERAPY FLDS PLACEMENT: CPT | Performed by: RADIOLOGY

## 2024-05-30 PROCEDURE — 77385 HC INTENSITY-MODULATED RADIATION THERAPY (IMRT), SIMPLE: CPT | Performed by: RADIOLOGY

## 2024-05-31 ENCOUNTER — HOSPITAL ENCOUNTER (OUTPATIENT)
Dept: RADIATION ONCOLOGY | Facility: CLINIC | Age: 74
Setting detail: RADIATION/ONCOLOGY SERIES
Discharge: HOME | End: 2024-05-31
Payer: MEDICARE

## 2024-05-31 DIAGNOSIS — Z51.0 ENCOUNTER FOR ANTINEOPLASTIC RADIATION THERAPY: ICD-10-CM

## 2024-05-31 DIAGNOSIS — C61 MALIGNANT NEOPLASM OF PROSTATE (MULTI): ICD-10-CM

## 2024-05-31 LAB
RAD ONC MSQ ACTUAL FRACTIONS DELIVERED: 9
RAD ONC MSQ ACTUAL SESSION DELIVERED DOSE: 300 CGRAY
RAD ONC MSQ ACTUAL TOTAL DOSE: 2700 CGRAY
RAD ONC MSQ ELAPSED DAYS: 11
RAD ONC MSQ LAST DATE: NORMAL
RAD ONC MSQ PRESCRIBED FRACTIONAL DOSE: 300 CGRAY
RAD ONC MSQ PRESCRIBED NUMBER OF FRACTIONS: 20
RAD ONC MSQ PRESCRIBED TECHNIQUE: NORMAL
RAD ONC MSQ PRESCRIBED TOTAL DOSE: 6000 CGRAY
RAD ONC MSQ PRESCRIPTION PATTERN COMMENT: NORMAL
RAD ONC MSQ START DATE: NORMAL
RAD ONC MSQ TREATMENT COURSE NUMBER: 1
RAD ONC MSQ TREATMENT SITE: NORMAL

## 2024-05-31 PROCEDURE — 77385 HC INTENSITY-MODULATED RADIATION THERAPY (IMRT), SIMPLE: CPT | Performed by: RADIOLOGY

## 2024-05-31 PROCEDURE — 77014 CHG CT GUIDANCE RADIATION THERAPY FLDS PLACEMENT: CPT | Performed by: RADIOLOGY

## 2024-06-03 ENCOUNTER — HOSPITAL ENCOUNTER (OUTPATIENT)
Dept: RADIATION ONCOLOGY | Facility: CLINIC | Age: 74
Setting detail: RADIATION/ONCOLOGY SERIES
Discharge: HOME | End: 2024-06-03
Payer: MEDICARE

## 2024-06-03 DIAGNOSIS — Z51.0 ENCOUNTER FOR ANTINEOPLASTIC RADIATION THERAPY: ICD-10-CM

## 2024-06-03 DIAGNOSIS — C61 MALIGNANT NEOPLASM OF PROSTATE (MULTI): ICD-10-CM

## 2024-06-03 LAB
RAD ONC MSQ ACTUAL FRACTIONS DELIVERED: 10
RAD ONC MSQ ACTUAL SESSION DELIVERED DOSE: 300 CGRAY
RAD ONC MSQ ACTUAL TOTAL DOSE: 3000 CGRAY
RAD ONC MSQ ELAPSED DAYS: 14
RAD ONC MSQ LAST DATE: NORMAL
RAD ONC MSQ PRESCRIBED FRACTIONAL DOSE: 300 CGRAY
RAD ONC MSQ PRESCRIBED NUMBER OF FRACTIONS: 20
RAD ONC MSQ PRESCRIBED TECHNIQUE: NORMAL
RAD ONC MSQ PRESCRIBED TOTAL DOSE: 6000 CGRAY
RAD ONC MSQ PRESCRIPTION PATTERN COMMENT: NORMAL
RAD ONC MSQ START DATE: NORMAL
RAD ONC MSQ TREATMENT COURSE NUMBER: 1
RAD ONC MSQ TREATMENT SITE: NORMAL

## 2024-06-03 PROCEDURE — 77385 HC INTENSITY-MODULATED RADIATION THERAPY (IMRT), SIMPLE: CPT | Performed by: RADIOLOGY

## 2024-06-04 ENCOUNTER — HOSPITAL ENCOUNTER (OUTPATIENT)
Dept: RADIATION ONCOLOGY | Facility: CLINIC | Age: 74
Setting detail: RADIATION/ONCOLOGY SERIES
Discharge: HOME | End: 2024-06-04
Payer: MEDICARE

## 2024-06-04 DIAGNOSIS — C61 MALIGNANT NEOPLASM OF PROSTATE (MULTI): ICD-10-CM

## 2024-06-04 DIAGNOSIS — Z51.0 ENCOUNTER FOR ANTINEOPLASTIC RADIATION THERAPY: ICD-10-CM

## 2024-06-04 LAB
RAD ONC MSQ ACTUAL FRACTIONS DELIVERED: 11
RAD ONC MSQ ACTUAL SESSION DELIVERED DOSE: 300 CGRAY
RAD ONC MSQ ACTUAL TOTAL DOSE: 3300 CGRAY
RAD ONC MSQ ELAPSED DAYS: 15
RAD ONC MSQ LAST DATE: NORMAL
RAD ONC MSQ PRESCRIBED FRACTIONAL DOSE: 300 CGRAY
RAD ONC MSQ PRESCRIBED NUMBER OF FRACTIONS: 20
RAD ONC MSQ PRESCRIBED TECHNIQUE: NORMAL
RAD ONC MSQ PRESCRIBED TOTAL DOSE: 6000 CGRAY
RAD ONC MSQ PRESCRIPTION PATTERN COMMENT: NORMAL
RAD ONC MSQ START DATE: NORMAL
RAD ONC MSQ TREATMENT COURSE NUMBER: 1
RAD ONC MSQ TREATMENT SITE: NORMAL

## 2024-06-04 PROCEDURE — 77385 HC INTENSITY-MODULATED RADIATION THERAPY (IMRT), SIMPLE: CPT | Performed by: RADIOLOGY

## 2024-06-05 ENCOUNTER — RADIATION ONCOLOGY OTV (OUTPATIENT)
Dept: RADIATION ONCOLOGY | Facility: CLINIC | Age: 74
End: 2024-06-05
Payer: MEDICARE

## 2024-06-05 ENCOUNTER — HOSPITAL ENCOUNTER (OUTPATIENT)
Dept: RADIATION ONCOLOGY | Facility: CLINIC | Age: 74
Setting detail: RADIATION/ONCOLOGY SERIES
Discharge: HOME | End: 2024-06-05
Payer: MEDICARE

## 2024-06-05 VITALS — BODY MASS INDEX: 29.4 KG/M2 | TEMPERATURE: 96.3 F | WEIGHT: 193.34 LBS

## 2024-06-05 DIAGNOSIS — Z51.0 ENCOUNTER FOR ANTINEOPLASTIC RADIATION THERAPY: ICD-10-CM

## 2024-06-05 DIAGNOSIS — C61 MALIGNANT NEOPLASM OF PROSTATE (MULTI): ICD-10-CM

## 2024-06-05 LAB
RAD ONC MSQ ACTUAL FRACTIONS DELIVERED: 12
RAD ONC MSQ ACTUAL SESSION DELIVERED DOSE: 300 CGRAY
RAD ONC MSQ ACTUAL TOTAL DOSE: 3600 CGRAY
RAD ONC MSQ ELAPSED DAYS: 16
RAD ONC MSQ LAST DATE: NORMAL
RAD ONC MSQ PRESCRIBED FRACTIONAL DOSE: 300 CGRAY
RAD ONC MSQ PRESCRIBED NUMBER OF FRACTIONS: 20
RAD ONC MSQ PRESCRIBED TECHNIQUE: NORMAL
RAD ONC MSQ PRESCRIBED TOTAL DOSE: 6000 CGRAY
RAD ONC MSQ PRESCRIPTION PATTERN COMMENT: NORMAL
RAD ONC MSQ START DATE: NORMAL
RAD ONC MSQ TREATMENT COURSE NUMBER: 1
RAD ONC MSQ TREATMENT SITE: NORMAL

## 2024-06-05 PROCEDURE — 77385 HC INTENSITY-MODULATED RADIATION THERAPY (IMRT), SIMPLE: CPT | Performed by: RADIOLOGY

## 2024-06-05 PROCEDURE — 77336 RADIATION PHYSICS CONSULT: CPT | Performed by: RADIOLOGY

## 2024-06-05 ASSESSMENT — PAIN SCALES - GENERAL: PAINLEVEL: 0-NO PAIN

## 2024-06-05 NOTE — PROGRESS NOTES
Radiation Oncology On Treatment Visit    Patient Name:  Mao Ordoñez  MRN:  [id number]  :  1950    Referring Provider: No ref. provider found  Primary Care Provider: Shadi Hazel MD  Care Team: Patient Care Team:  Shadi Hazel MD as PCP - General  Shadi Hazel MD as PCP - MSSP ACO Attributed Provider    Date of Service: 2024     Diagnosis:   Specialty Problems          Radiation Oncology Problems    Prostate cancer (Multi)         Treatment Summary:  IMRT: Prostate    Treatment Period Technique Fraction Dose Fractions Total Dose   Course 1 2024-2024  (days elapsed: 16)         Prostate+SV 2024-2024 VMAT 300 / 300 cGy  3600 / 6,000 cGy     SUBJECTIVE: Patient denies pain, on flomax. Nocturia x3, reports hot flashes, bowels normal.      OBJECTIVE:   Vital Signs:  Temp 35.7 °C (96.3 °F) (Temporal)   Wt 87.7 kg (193 lb 5.5 oz)   BMI 29.40 kg/m²    Pain Scale: The patient's current pain level was assessed.  They report currently having a pain of 0 out of 10.    Other Pertinent Findings:     Toxicity Assessment          2024    10:00 2024    09:31 2024    09:00   Toxicity Assessment   Adverse Events Reviewed (WDL) Yes (Within Defined Limits) Yes (Within Defined Limits) Yes (Within Defined Limits)   Treatment Site Pelvis - male Pelvis - male Pelvis - male   Anorexia Grade 0 Grade 0 Grade 0   Dehydration Grade 0 Grade 0 Grade 0   Dermatitis Radiation Grade 0 Grade 0 Grade 0   Diarrhea Grade 0 Grade 0 Grade 0       softer stool noted   Fatigue Grade 0 Grade 1 Grade 1   Nausea Grade 0 Grade 0    Pain Grade 0 Grade 0 Grade 0   Abdominal Pain Grade 0 Grade 0    Bloating Grade 0     Constipation Grade 0 Grade 0 Grade 0   Fecal Incontinence Grade 0  Grade 0   Hematuria Grade 0 Grade 0 Grade 0   Urinary Incontinence Grade 0 Grade 0 Grade 0   Urinary Frequency Grade 0 Grade 1       Nocturia x5-6 Grade 1   Urinary Retention Grade 0 Grade 1 Grade 1   Urinary Tract  Obstruction Grade 0       some straining to begin urination Grade 0 Grade 0   Urinary Tract Pain Grade 0 Grade 0    Urinary Urgency Grade 1       intermittent Grade 1 Grade 1       nocturia x3 on flomax        Assessment / Plan:  The patient is tolerating radiation therapy as anticipated.  Continue per current treatment plan.   Recommended advil

## 2024-06-06 ENCOUNTER — HOSPITAL ENCOUNTER (OUTPATIENT)
Dept: RADIATION ONCOLOGY | Facility: CLINIC | Age: 74
Setting detail: RADIATION/ONCOLOGY SERIES
Discharge: HOME | End: 2024-06-06
Payer: MEDICARE

## 2024-06-06 DIAGNOSIS — C61 MALIGNANT NEOPLASM OF PROSTATE (MULTI): ICD-10-CM

## 2024-06-06 DIAGNOSIS — Z51.0 ENCOUNTER FOR ANTINEOPLASTIC RADIATION THERAPY: ICD-10-CM

## 2024-06-06 LAB
RAD ONC MSQ ACTUAL FRACTIONS DELIVERED: 13
RAD ONC MSQ ACTUAL SESSION DELIVERED DOSE: 300 CGRAY
RAD ONC MSQ ACTUAL TOTAL DOSE: 3900 CGRAY
RAD ONC MSQ ELAPSED DAYS: 17
RAD ONC MSQ LAST DATE: NORMAL
RAD ONC MSQ PRESCRIBED FRACTIONAL DOSE: 300 CGRAY
RAD ONC MSQ PRESCRIBED NUMBER OF FRACTIONS: 20
RAD ONC MSQ PRESCRIBED TECHNIQUE: NORMAL
RAD ONC MSQ PRESCRIBED TOTAL DOSE: 6000 CGRAY
RAD ONC MSQ PRESCRIPTION PATTERN COMMENT: NORMAL
RAD ONC MSQ START DATE: NORMAL
RAD ONC MSQ TREATMENT COURSE NUMBER: 1
RAD ONC MSQ TREATMENT SITE: NORMAL

## 2024-06-06 PROCEDURE — 77385 HC INTENSITY-MODULATED RADIATION THERAPY (IMRT), SIMPLE: CPT | Performed by: RADIOLOGY

## 2024-06-07 ENCOUNTER — HOSPITAL ENCOUNTER (OUTPATIENT)
Dept: RADIATION ONCOLOGY | Facility: CLINIC | Age: 74
Setting detail: RADIATION/ONCOLOGY SERIES
Discharge: HOME | End: 2024-06-07
Payer: MEDICARE

## 2024-06-07 DIAGNOSIS — C61 MALIGNANT NEOPLASM OF PROSTATE (MULTI): ICD-10-CM

## 2024-06-07 DIAGNOSIS — Z51.0 ENCOUNTER FOR ANTINEOPLASTIC RADIATION THERAPY: ICD-10-CM

## 2024-06-07 LAB
RAD ONC MSQ ACTUAL FRACTIONS DELIVERED: 14
RAD ONC MSQ ACTUAL SESSION DELIVERED DOSE: 300 CGRAY
RAD ONC MSQ ACTUAL TOTAL DOSE: 4200 CGRAY
RAD ONC MSQ ELAPSED DAYS: 18
RAD ONC MSQ LAST DATE: NORMAL
RAD ONC MSQ PRESCRIBED FRACTIONAL DOSE: 300 CGRAY
RAD ONC MSQ PRESCRIBED NUMBER OF FRACTIONS: 20
RAD ONC MSQ PRESCRIBED TECHNIQUE: NORMAL
RAD ONC MSQ PRESCRIBED TOTAL DOSE: 6000 CGRAY
RAD ONC MSQ PRESCRIPTION PATTERN COMMENT: NORMAL
RAD ONC MSQ START DATE: NORMAL
RAD ONC MSQ TREATMENT COURSE NUMBER: 1
RAD ONC MSQ TREATMENT SITE: NORMAL

## 2024-06-07 PROCEDURE — 77385 HC INTENSITY-MODULATED RADIATION THERAPY (IMRT), SIMPLE: CPT | Performed by: RADIOLOGY

## 2024-06-10 ENCOUNTER — HOSPITAL ENCOUNTER (OUTPATIENT)
Dept: RADIATION ONCOLOGY | Facility: CLINIC | Age: 74
Setting detail: RADIATION/ONCOLOGY SERIES
Discharge: HOME | End: 2024-06-10
Payer: MEDICARE

## 2024-06-10 DIAGNOSIS — Z51.0 ENCOUNTER FOR ANTINEOPLASTIC RADIATION THERAPY: ICD-10-CM

## 2024-06-10 DIAGNOSIS — C61 MALIGNANT NEOPLASM OF PROSTATE (MULTI): ICD-10-CM

## 2024-06-10 LAB
RAD ONC MSQ ACTUAL FRACTIONS DELIVERED: 15
RAD ONC MSQ ACTUAL SESSION DELIVERED DOSE: 300 CGRAY
RAD ONC MSQ ACTUAL TOTAL DOSE: 4500 CGRAY
RAD ONC MSQ ELAPSED DAYS: 21
RAD ONC MSQ LAST DATE: NORMAL
RAD ONC MSQ PRESCRIBED FRACTIONAL DOSE: 300 CGRAY
RAD ONC MSQ PRESCRIBED NUMBER OF FRACTIONS: 20
RAD ONC MSQ PRESCRIBED TECHNIQUE: NORMAL
RAD ONC MSQ PRESCRIBED TOTAL DOSE: 6000 CGRAY
RAD ONC MSQ PRESCRIPTION PATTERN COMMENT: NORMAL
RAD ONC MSQ START DATE: NORMAL
RAD ONC MSQ TREATMENT COURSE NUMBER: 1
RAD ONC MSQ TREATMENT SITE: NORMAL

## 2024-06-10 PROCEDURE — 77385 HC INTENSITY-MODULATED RADIATION THERAPY (IMRT), SIMPLE: CPT | Performed by: RADIOLOGY

## 2024-06-11 ENCOUNTER — HOSPITAL ENCOUNTER (OUTPATIENT)
Dept: RADIATION ONCOLOGY | Facility: CLINIC | Age: 74
Setting detail: RADIATION/ONCOLOGY SERIES
Discharge: HOME | End: 2024-06-11
Payer: MEDICARE

## 2024-06-11 DIAGNOSIS — Z51.0 ENCOUNTER FOR ANTINEOPLASTIC RADIATION THERAPY: ICD-10-CM

## 2024-06-11 DIAGNOSIS — C61 MALIGNANT NEOPLASM OF PROSTATE (MULTI): ICD-10-CM

## 2024-06-11 LAB
RAD ONC MSQ ACTUAL FRACTIONS DELIVERED: 16
RAD ONC MSQ ACTUAL SESSION DELIVERED DOSE: 300 CGRAY
RAD ONC MSQ ACTUAL TOTAL DOSE: 4800 CGRAY
RAD ONC MSQ ELAPSED DAYS: 22
RAD ONC MSQ LAST DATE: NORMAL
RAD ONC MSQ PRESCRIBED FRACTIONAL DOSE: 300 CGRAY
RAD ONC MSQ PRESCRIBED NUMBER OF FRACTIONS: 20
RAD ONC MSQ PRESCRIBED TECHNIQUE: NORMAL
RAD ONC MSQ PRESCRIBED TOTAL DOSE: 6000 CGRAY
RAD ONC MSQ PRESCRIPTION PATTERN COMMENT: NORMAL
RAD ONC MSQ START DATE: NORMAL
RAD ONC MSQ TREATMENT COURSE NUMBER: 1
RAD ONC MSQ TREATMENT SITE: NORMAL

## 2024-06-11 PROCEDURE — 77385 HC INTENSITY-MODULATED RADIATION THERAPY (IMRT), SIMPLE: CPT | Performed by: RADIOLOGY

## 2024-06-12 ENCOUNTER — RADIATION ONCOLOGY OTV (OUTPATIENT)
Dept: RADIATION ONCOLOGY | Facility: CLINIC | Age: 74
End: 2024-06-12
Payer: MEDICARE

## 2024-06-12 ENCOUNTER — HOSPITAL ENCOUNTER (OUTPATIENT)
Dept: RADIATION ONCOLOGY | Facility: CLINIC | Age: 74
Setting detail: RADIATION/ONCOLOGY SERIES
Discharge: HOME | End: 2024-06-12
Payer: MEDICARE

## 2024-06-12 VITALS — BODY MASS INDEX: 29.8 KG/M2 | TEMPERATURE: 97 F | WEIGHT: 195.99 LBS

## 2024-06-12 DIAGNOSIS — C61 PROSTATE CANCER (MULTI): Primary | ICD-10-CM

## 2024-06-12 DIAGNOSIS — Z51.0 ENCOUNTER FOR ANTINEOPLASTIC RADIATION THERAPY: ICD-10-CM

## 2024-06-12 DIAGNOSIS — C61 MALIGNANT NEOPLASM OF PROSTATE (MULTI): ICD-10-CM

## 2024-06-12 LAB
RAD ONC MSQ ACTUAL FRACTIONS DELIVERED: 17
RAD ONC MSQ ACTUAL SESSION DELIVERED DOSE: 300 CGRAY
RAD ONC MSQ ACTUAL TOTAL DOSE: 5100 CGRAY
RAD ONC MSQ ELAPSED DAYS: 23
RAD ONC MSQ LAST DATE: NORMAL
RAD ONC MSQ PRESCRIBED FRACTIONAL DOSE: 300 CGRAY
RAD ONC MSQ PRESCRIBED NUMBER OF FRACTIONS: 20
RAD ONC MSQ PRESCRIBED TECHNIQUE: NORMAL
RAD ONC MSQ PRESCRIBED TOTAL DOSE: 6000 CGRAY
RAD ONC MSQ PRESCRIPTION PATTERN COMMENT: NORMAL
RAD ONC MSQ START DATE: NORMAL
RAD ONC MSQ TREATMENT COURSE NUMBER: 1
RAD ONC MSQ TREATMENT SITE: NORMAL

## 2024-06-12 PROCEDURE — 77385 HC INTENSITY-MODULATED RADIATION THERAPY (IMRT), SIMPLE: CPT | Performed by: RADIOLOGY

## 2024-06-12 PROCEDURE — 77336 RADIATION PHYSICS CONSULT: CPT | Performed by: RADIOLOGY

## 2024-06-12 ASSESSMENT — PAIN SCALES - GENERAL: PAINLEVEL: 0-NO PAIN

## 2024-06-12 NOTE — PROGRESS NOTES
Radiation Oncology On Treatment Visit    Patient Name:  Mao Ordoñez  MRN:  [id number]  :  1950    Referring Provider: No ref. provider found  Primary Care Provider: Shadi Hazel MD  Care Team: Patient Care Team:  Shadi Hazel MD as PCP - General  Shadi Hazel MD as PCP - MSSP ACO Attributed Provider    Date of Service: 2024     Diagnosis:   Specialty Problems          Radiation Oncology Problems    Prostate cancer (Multi)         Treatment Summary:  IMRT: Prostate    Treatment Period Technique Fraction Dose Fractions Total Dose   Course 1 2024-2024  (days elapsed: 23)         Prostate+SV 2024-2024 VMAT 300 / 300 cGy  5100 / 6,000 cGy     SUBJECTIVE: Urinary hesitancy, nocturia x3/night, Urinary frequency and urgency. Denies any burning, pain or hematuria. On Flomax 0.4mg once a day. Mild fatigue. Last treatment planned for this Monday.        OBJECTIVE:   Vital Signs:  Temp 36.1 °C (97 °F) (Temporal)   Wt 88.9 kg (195 lb 15.8 oz)   BMI 29.80 kg/m²    Pain Scale: The patient's current pain level was assessed.  They report currently having a pain of 0 out of 10.    Other Pertinent Findings:     Toxicity Assessment          2024    10:00 2024    09:31 2024    09:00 2024    09:35   Toxicity Assessment   Adverse Events Reviewed (WDL) Yes (Within Defined Limits) Yes (Within Defined Limits) Yes (Within Defined Limits) Yes (Within Defined Limits)   Treatment Site Pelvis - male Pelvis - male Pelvis - male Pelvis - male   Anorexia Grade 0 Grade 0 Grade 0 Grade 0   Dehydration Grade 0 Grade 0 Grade 0 Grade 0   Dermatitis Radiation Grade 0 Grade 0 Grade 0 Grade 0   Diarrhea Grade 0 Grade 0 Grade 0       softer stool noted Grade 0   Fatigue Grade 0 Grade 1 Grade 1 Grade 1       relieved with rest   Nausea Grade 0 Grade 0  Grade 0   Pain Grade 0 Grade 0 Grade 0 Grade 0   Abdominal Pain Grade 0 Grade 0  Grade 0   Bloating Grade 0   Grade 0   Constipation  Grade 0 Grade 0 Grade 0 Grade 0   Fecal Incontinence Grade 0  Grade 0 Grade 0   Hematuria Grade 0 Grade 0 Grade 0    Urinary Incontinence Grade 0 Grade 0 Grade 0    Urinary Frequency Grade 0 Grade 1       Nocturia x5-6 Grade 1 Grade 1       nocturia x3/night. On flomax o.4mg   Urinary Retention Grade 0 Grade 1 Grade 1 Grade 0   Urinary Tract Obstruction Grade 0       some straining to begin urination Grade 0 Grade 0 Grade 0   Urinary Tract Pain Grade 0 Grade 0  Grade 0   Urinary Urgency Grade 1       intermittent Grade 1 Grade 1       nocturia x3 on flomax Grade 1        Assessment / Plan:  The patient is tolerating radiation therapy as anticipated.  Continue per current treatment plan.  FU in 4 months with Pily Carpio

## 2024-06-13 ENCOUNTER — HOSPITAL ENCOUNTER (OUTPATIENT)
Dept: RADIATION ONCOLOGY | Facility: CLINIC | Age: 74
Setting detail: RADIATION/ONCOLOGY SERIES
Discharge: HOME | End: 2024-06-13
Payer: MEDICARE

## 2024-06-13 DIAGNOSIS — C61 MALIGNANT NEOPLASM OF PROSTATE (MULTI): ICD-10-CM

## 2024-06-13 DIAGNOSIS — Z51.0 ENCOUNTER FOR ANTINEOPLASTIC RADIATION THERAPY: ICD-10-CM

## 2024-06-13 LAB
RAD ONC MSQ ACTUAL FRACTIONS DELIVERED: 18
RAD ONC MSQ ACTUAL SESSION DELIVERED DOSE: 300 CGRAY
RAD ONC MSQ ACTUAL TOTAL DOSE: 5400 CGRAY
RAD ONC MSQ ELAPSED DAYS: 24
RAD ONC MSQ LAST DATE: NORMAL
RAD ONC MSQ PRESCRIBED FRACTIONAL DOSE: 300 CGRAY
RAD ONC MSQ PRESCRIBED NUMBER OF FRACTIONS: 20
RAD ONC MSQ PRESCRIBED TECHNIQUE: NORMAL
RAD ONC MSQ PRESCRIBED TOTAL DOSE: 6000 CGRAY
RAD ONC MSQ PRESCRIPTION PATTERN COMMENT: NORMAL
RAD ONC MSQ START DATE: NORMAL
RAD ONC MSQ TREATMENT COURSE NUMBER: 1
RAD ONC MSQ TREATMENT SITE: NORMAL

## 2024-06-13 PROCEDURE — 77385 HC INTENSITY-MODULATED RADIATION THERAPY (IMRT), SIMPLE: CPT | Performed by: RADIOLOGY

## 2024-06-14 ENCOUNTER — APPOINTMENT (OUTPATIENT)
Dept: RADIATION ONCOLOGY | Facility: CLINIC | Age: 74
End: 2024-06-14
Payer: MEDICARE

## 2024-06-14 ENCOUNTER — HOSPITAL ENCOUNTER (OUTPATIENT)
Dept: RADIATION ONCOLOGY | Facility: CLINIC | Age: 74
Setting detail: RADIATION/ONCOLOGY SERIES
Discharge: HOME | End: 2024-06-14
Payer: MEDICARE

## 2024-06-14 DIAGNOSIS — Z51.0 ENCOUNTER FOR ANTINEOPLASTIC RADIATION THERAPY: ICD-10-CM

## 2024-06-14 DIAGNOSIS — C61 MALIGNANT NEOPLASM OF PROSTATE (MULTI): ICD-10-CM

## 2024-06-14 LAB
RAD ONC MSQ ACTUAL FRACTIONS DELIVERED: 19
RAD ONC MSQ ACTUAL SESSION DELIVERED DOSE: 300 CGRAY
RAD ONC MSQ ACTUAL TOTAL DOSE: 5700 CGRAY
RAD ONC MSQ ELAPSED DAYS: 25
RAD ONC MSQ LAST DATE: NORMAL
RAD ONC MSQ PRESCRIBED FRACTIONAL DOSE: 300 CGRAY
RAD ONC MSQ PRESCRIBED NUMBER OF FRACTIONS: 20
RAD ONC MSQ PRESCRIBED TECHNIQUE: NORMAL
RAD ONC MSQ PRESCRIBED TOTAL DOSE: 6000 CGRAY
RAD ONC MSQ PRESCRIPTION PATTERN COMMENT: NORMAL
RAD ONC MSQ START DATE: NORMAL
RAD ONC MSQ TREATMENT COURSE NUMBER: 1
RAD ONC MSQ TREATMENT SITE: NORMAL

## 2024-06-14 PROCEDURE — 77385 HC INTENSITY-MODULATED RADIATION THERAPY (IMRT), SIMPLE: CPT | Performed by: RADIOLOGY

## 2024-06-17 ENCOUNTER — HOSPITAL ENCOUNTER (OUTPATIENT)
Dept: RADIATION ONCOLOGY | Facility: CLINIC | Age: 74
Setting detail: RADIATION/ONCOLOGY SERIES
Discharge: HOME | End: 2024-06-17
Payer: MEDICARE

## 2024-06-17 ENCOUNTER — DOCUMENTATION (OUTPATIENT)
Dept: RADIATION ONCOLOGY | Facility: CLINIC | Age: 74
End: 2024-06-17

## 2024-06-17 DIAGNOSIS — C61 MALIGNANT NEOPLASM OF PROSTATE (MULTI): ICD-10-CM

## 2024-06-17 DIAGNOSIS — Z51.0 ENCOUNTER FOR ANTINEOPLASTIC RADIATION THERAPY: ICD-10-CM

## 2024-06-17 LAB
RAD ONC MSQ ACTUAL FRACTIONS DELIVERED: 20
RAD ONC MSQ ACTUAL SESSION DELIVERED DOSE: 300 CGRAY
RAD ONC MSQ ACTUAL TOTAL DOSE: 6000 CGRAY
RAD ONC MSQ ELAPSED DAYS: 28
RAD ONC MSQ LAST DATE: NORMAL
RAD ONC MSQ PRESCRIBED FRACTIONAL DOSE: 300 CGRAY
RAD ONC MSQ PRESCRIBED NUMBER OF FRACTIONS: 20
RAD ONC MSQ PRESCRIBED TECHNIQUE: NORMAL
RAD ONC MSQ PRESCRIBED TOTAL DOSE: 6000 CGRAY
RAD ONC MSQ PRESCRIPTION PATTERN COMMENT: NORMAL
RAD ONC MSQ START DATE: NORMAL
RAD ONC MSQ TREATMENT COURSE NUMBER: 1
RAD ONC MSQ TREATMENT SITE: NORMAL

## 2024-06-17 PROCEDURE — 77385 HC INTENSITY-MODULATED RADIATION THERAPY (IMRT), SIMPLE: CPT | Performed by: RADIOLOGY

## 2024-06-18 RX ORDER — LEUPROLIDE ACETATE 45 MG
45 KIT INTRAMUSCULAR ONCE
COMMUNITY
Start: 2024-04-15

## 2024-06-19 NOTE — PROGRESS NOTES
Radiation Oncology Treatment Summary    Patient Name:  Mao Ordoñez  MRN:  [id number]  :  1950    Referring Provider: No ref. provider found  Primary Care Provider: Shadi Hazel MD    Brief History: Mao Ordoñez is a 73 y.o. male with Prostate cancer (Multi), Clinical: Stage IIB (cT2a, cN0, cM0, PSA: 12.8, Grade Group: 2).  The patient completed radiotherapy as outlined below.    Radiation Treatment Summary:    IMRT: Prostate    Treatment Period Technique Fraction Dose Fractions Total Dose   Course 1 2024-2024  (days elapsed: 28)         Prostate+SV 2024-2024 VMAT 300 / 300 cGy  6000 / 6,000 cGy       Please see the patient's [facility] chart for further details regarding the radiation plan, including beam energy.    Concurrent Chemotherapy:  Treatment Plans       No treatment plans exist          [facility] Toxicity Overview:   Toxicity Assessment          2024    10:00 2024    09:31 2024    09:00 2024    09:35   Toxicity Assessment   Adverse Events Reviewed (WDL) Yes (Within Defined Limits) Yes (Within Defined Limits) Yes (Within Defined Limits) Yes (Within Defined Limits)   Treatment Site Pelvis - male Pelvis - male Pelvis - male Pelvis - male   Anorexia Grade 0 Grade 0 Grade 0 Grade 0   Dehydration Grade 0 Grade 0 Grade 0 Grade 0   Dermatitis Radiation Grade 0 Grade 0 Grade 0 Grade 0   Diarrhea Grade 0 Grade 0 Grade 0       softer stool noted Grade 0   Fatigue Grade 0 Grade 1 Grade 1 Grade 1       relieved with rest   Nausea Grade 0 Grade 0  Grade 0   Pain Grade 0 Grade 0 Grade 0 Grade 0   Abdominal Pain Grade 0 Grade 0  Grade 0   Bloating Grade 0   Grade 0   Constipation Grade 0 Grade 0 Grade 0 Grade 0   Fecal Incontinence Grade 0  Grade 0 Grade 0   Hematuria Grade 0 Grade 0 Grade 0    Urinary Incontinence Grade 0 Grade 0 Grade 0    Urinary Frequency Grade 0 Grade 1       Nocturia x5-6 Grade 1 Grade 1       nocturia x3/night. On flomax o.4mg   Urinary  Retention Grade 0 Grade 1 Grade 1 Grade 0   Urinary Tract Obstruction Grade 0       some straining to begin urination Grade 0 Grade 0 Grade 0   Urinary Tract Pain Grade 0 Grade 0  Grade 0   Urinary Urgency Grade 1       intermittent Grade 1 Grade 1       nocturia x3 on flomax Grade 1     Patient Disposition: Follow up with Pily Giraldo 10/1/24

## 2024-07-22 LAB
ALANINE AMINOTRANSFERASE (SGPT) (U/L) IN SER/PLAS: 31 U/L
ALKALINE PHOSPHATASE (U/L) IN SER/PLAS: 60 U/L
ASPARTATE AMINOTRANSFERASE (SGOT) (U/L) IN SER/PLAS: 32 U/L
BILIRUBIN, TOTAL  (MG/DL) IN SER/PLAS: 0.6 MG/DL (ref 0–1.2)
CHOLESTEROL (MG/DL) IN SER/PLAS: 122 MG/DL
CREATININE (MG/DL) IN SER/PLAS: 1.1 MG/DL
GLUCOSE: 94 MG/DL
HDL-CHOLESTEROL (MG/DL) IN SER/PLAS: 27 MG/DL
HEMATOCRIT (%) IN BLOOD BY AUTOMATED COUNT: 35.6 % (ref 41–52)
HEMOGLOBIN (G/DL) IN BLOOD: 12.4 G/DL (ref 13.5–17.5)
LDL CHOLESTEROL: 80 MG/DL
LEUKOCYTES (10*3/UL) IN BLOOD BY AUTOMATED COUNT: 5.3 X10*3/UL (ref 4.4–11.3)
NEUTROPHILS (10*3/UL) IN BLOOD BY AUTOMATED COUNT: 41.1 X10*3/UL (ref 1.6–5.5)
PLATELETS (10*3/UL) IN BLOOD AUTOMATED COUNT: 178 X10*3/UL (ref 150–450)
POTASSIUM (MMOL/L) IN SER/PLAS: 4.4 MMOL/L
SODIUM (MMOL/L) IN SER/PLAS: 137 MMOL/L
THYROID STIMULATING HORMONE (MIU/L) IN SER/PLAS: 1.16 MIU/L
TRIGLYCERIDES  (MG/DL) IN SER/PLAS: 131 MG/DL
UREA NITROGEN (MG/DL) IN SER/PLAS: 21 MG/DL

## 2024-07-23 ENCOUNTER — APPOINTMENT (OUTPATIENT)
Dept: GENETICS | Facility: CLINIC | Age: 74
End: 2024-07-23
Payer: MEDICARE

## 2024-07-23 VITALS
BODY MASS INDEX: 29.25 KG/M2 | SYSTOLIC BLOOD PRESSURE: 119 MMHG | RESPIRATION RATE: 18 BRPM | DIASTOLIC BLOOD PRESSURE: 73 MMHG | WEIGHT: 193 LBS | HEIGHT: 68 IN | TEMPERATURE: 97.9 F | HEART RATE: 96 BPM

## 2024-07-23 DIAGNOSIS — Z80.9 FAMILY HISTORY OF CANCER: ICD-10-CM

## 2024-07-23 DIAGNOSIS — C61 PROSTATE CANCER (MULTI): Primary | ICD-10-CM

## 2024-07-23 PROCEDURE — G2212 PROLONG OUTPT/OFFICE VIS: HCPCS | Performed by: STUDENT IN AN ORGANIZED HEALTH CARE EDUCATION/TRAINING PROGRAM

## 2024-07-23 PROCEDURE — 3078F DIAST BP <80 MM HG: CPT | Performed by: STUDENT IN AN ORGANIZED HEALTH CARE EDUCATION/TRAINING PROGRAM

## 2024-07-23 PROCEDURE — 99215 OFFICE O/P EST HI 40 MIN: CPT | Performed by: STUDENT IN AN ORGANIZED HEALTH CARE EDUCATION/TRAINING PROGRAM

## 2024-07-23 PROCEDURE — 1159F MED LIST DOCD IN RCRD: CPT | Performed by: STUDENT IN AN ORGANIZED HEALTH CARE EDUCATION/TRAINING PROGRAM

## 2024-07-23 PROCEDURE — 3074F SYST BP LT 130 MM HG: CPT | Performed by: STUDENT IN AN ORGANIZED HEALTH CARE EDUCATION/TRAINING PROGRAM

## 2024-07-23 ASSESSMENT — PATIENT HEALTH QUESTIONNAIRE - PHQ9
SUM OF ALL RESPONSES TO PHQ9 QUESTIONS 1 & 2: 0
1. LITTLE INTEREST OR PLEASURE IN DOING THINGS: NOT AT ALL
2. FEELING DOWN, DEPRESSED OR HOPELESS: NOT AT ALL

## 2024-07-23 ASSESSMENT — ENCOUNTER SYMPTOMS
LOSS OF SENSATION IN FEET: 0
DEPRESSION: 0
OCCASIONAL FEELINGS OF UNSTEADINESS: 0

## 2024-07-23 NOTE — PROGRESS NOTES
Basic info:  Mao Ordoñez (Legal Name)   73 y.o., 1950   Legal sex: Male   Marital status:    Race: White   Ethnicity: Not , /a, or Nauruan origin   Languages   English  MRN: [id number]    Requesting physician/service:  Reason for the visit / consultation:      Referred by: Remigio Ceballos MD  For: prostate cancer, family history  Last seen on: 4/22/2024    History of Present Illness:    The patient presented to the clinic with wife    Mao Ordoñez is a 73 year old man presenting to clinic for genetics evaluation of his prostate cancer. He was initially diagnosed in 2022 with low risk Yuko 3+3 score disease on active surveillance. His PSA levels have trended upward since then and he was re-biopsied 2024-02-13 which revealed an increase in stage [id number], score [place] 3+4. He is being treated by Dr. Ceballos in Urology and Dr. Milian in Rad Onc. He opted for a treatment plan of 6 months ADT and XRT. Family history is also remarkable for prostate cancer in his father and two brothers. His medical history is complicated by HTN, hyperlipidemia, hypothyroidism, AV block, and CABG 5 years ago. Surgical history is most remarkable for recurrent hernias.     Followup for prostate cancer diagnosis  Family history of prostate cancer - 2 brothers and father, one brother remain    From Dr. Ceballos:  -09/2022: Initial diagnosis, low risk prostate cancer, [place] 3+3 in right apex, left apex, left mid  -12/22: 7.75  -04/23: PSA 12.76  -07/23: 9.6     Admitted 11/1/23 w/ chest pain, work-up was negative.      Endorses: no daytime frequency, occasional urge, mildly weak stream  Denies: hesitancy, hematuria, leakage, post-void dribble  Also, NTF 3-4x worse in recent months.      2/13/24 - TP biopsy 31g, PSAD 0.3 YEIMY L apex <50% prostate (pT2a)  Path - GG2 LPM, LPL, LB, LA up to 90% tissue, RA GG1, RPL GG1     Risk group: Intermediate risk, unfavorable (>50% biopsy cores), PSA >10 (x1), GG2, pT2a  -  Opted for ADT x 6mo and XRT    We last saw the patient on 4/22/2024.  During the last visit, our impressions were:     Mao Ordoñez is 74 y/o male with:   - Prostate cancer:   --- progress from Yuko 3+3 to now  [place] 3+4 [id number]  --- Now on ADT+R/T    - Family history of multiple prostate cancer: father, 2 brothers  - Family history of early breast cancer: daughter: 48 y/o     - HTN, CAD s/p CABG, 2nd degree AV block    Genetic testing can influence the treatment approach for a patient's cancer. For instance, PARP inhibitors may be used if a patient carries a BRCA pathogenic variant, while Belzutifan (a HIF-2? inhibitor) could be indicated for those with a VHL pathogenic variant. Additionally, specific genes may warrant particular follow-up and surveillance guidelines, or make patients eligible for clinical trials. This information is also vital for other family members.      Our plans were:  ## Genetic testing: [facility] cancer genetics panel  --- The sample will be collected by saliva kit    We got the results back, with my independent interpretation:  [facility] BRCA1/BRCA2 panel: negative  [facility] multi-cancer panel: HOXB13 c.251G>A (p.Hkd02Sdt) heterozygous Increased Risk Allele    During the interval:  Rad Onc-> 6/12/2024  The patient is tolerating radiation therapy as anticipated.  Continue per current treatment plan.  FU in 4 months with Pily Carpio     Feeling the same.   Little bit tired initially, now settled down.       Review of Systems:    Constitutional: No concerns reported. Hot flashes   Head and Neck:  No concerns reported.  Eyes:  Wears glasses.   ENT:  Some difficulty swallowing white rice.  Respiratory:  No concerns reported  Cardiovascular: CABG ~6 years ago. HTN. 2nd degree AV block  Gastrointestinal:  No concerns reported  Genitourinary:  Gets up a couple times at night to go to the bathroom. Prostate cancer  Reproductive/Endocrine:  ED.   Musculoskeletal:  No concerns  reported  Hematology/Lymphatic:  Bruises when scratching.  Skin: Dry skin. Easy scratched  Neurological:  No concerns reported  Psychiatric: No concerns reported         Pertinent positive and negative ROS are listed in HPI, PMH and above, otherwise reviewed in detail for 15 systems and negative      Past Medical History:      Hypertension   hyperlipidemia  AV block - 2nd degree  BPH  CABG 5y ago    Past Surgical History:      hiatal hernia  inguinal hernias  Knee surgery meniscus repair  Nose surgery  Vasectomy    Allergies:  Vancomycin (red man syndrome)     Immunizations:    No update    Medications:    aspirin 81 mg EC tablet 162 mg, Once            atorvastatin (Lipitor) 80 mg tablet 80 mg, Daily           ezetimibe (Zetia) 10 mg tablet 10 mg, Daily           leuprolide, 6-month, (Lupron Depot, 6 Month,) 45 mg injection 45 mg, Once           levothyroxine (Synthroid, Levoxyl) 100 mcg tablet 100 mcg, Daily           lisinopril 20 mg tablet 20 mg, Daily           loratadine (Claritin) 10 mg tablet 1 tablet, Daily           metoprolol succinate XL (Toprol-XL) 25 mg 24 hr tablet 25 mg, Daily           nitroglycerin (Nitrostat) 0.4 mg SL tablet 0.4 mg, As needed           ranolazine (Ranexa) 500 mg 12 hr tablet 500 mg, 2 times daily           tadalafil (Cialis) 20 mg tablet () 20 mg, Every 72 hours PRN        Patient not taking. Reported on 2024   tamsulosin (Flomax) 0.4 mg 24 hr capsule 0.4 mg, Daily           triamcinolone acetonide 0.025 % lotion             Family History:    Paternal ancestry: Central   Maternal ancestry:  Central   Ashkenazi Orthodoxy ancestry: none    #First degree relatives:  - Father: prostate cancer  - Mother:  - Full siblinx brothers: prostate cancer  - Offspring: One daughter with breast cancer at age 41    Other than the above, upon our specific inquiries, there is no report of family history of intellectual disability or learning disability, autism, birth  "defect, multiple miscarriage or stillbirth, infant or early childhood death, consanguinity, and other known genetic disease.     Social History:    Living with wife  Occupation:   Smoke? Never     Examination:       Basic Vital Sign Results:  Blood pressure 119/73, pulse 96, temperature 36.6 °C (97.9 °F), resp. rate 18, height 1.727 m (5' 8\"), weight 87.5 kg (193 lb).     Physical Exam:   General: No distress. Spontaneous movements. Appears well-developed and well-nourished.  Skin: No rashes or jaundice. Warm and dry. Prone to have skin scratches with minor scratch or rubber. Prone to have skin scratches with minor scratch or rubber.    HEENT:  Hair: No abnormal texture and distribution    Head Shape: normocephalic, atraumatic   Face:  no asymmetry for facial expression  Eyes:  No hypertelorism, no periorbital edema, no epicanthal folds. No discharge or swelling. No scleral icterus or injection. Wear glasses  Ears:  set and rotation within normal limit, no pits or tags  Nose: no apparent dysmorphology found  Mouth: no microretrognathia, no high palate  Neck:  no webbing      Torso:   Chest: No respiratory distress.   Heart: Warm and well-perfused without cyanosis.  Abdomen:  Deferred  Back:  Deferred    Ext & Neuro:  Extremities:  No abnormal palmar creasing, no arachnodactyly, no clinodactyly, no syndactyly, no overlapping fingers. No pitting edema.   Neurological: Good head control. Spontaneous movements of arms and legs. Normal tone. No evidence of spasticity. There were no involuntary movements or tremors.   Psychiatric: Alert and awake. Appropriate response to the exam for the age. Oriented to person, place, and time. No abnormal mood and affect. No abnormal speech and behavior. Judgment and thought content normal.           Lab Results:  Molecular genetics:  [facility] BRCA1/BRCA2 panel: negative  [facility] multi-cancer panel: HOXB13 c.251G>A (p.Pbb20Vxx) heterozygous Increased Risk " Allele    Diagnostic Imaging:      Interpreted By:  Samira Miller,   STUDY:  CT ABDOMEN PELVIS W IV CONTRAST; ;  4/24/2024 9:50 am      INDICATION:  Signs/Symptoms:prostate cancer staging.      COMPARISON:  PET PSMA scan 03/21/2024            IMPRESSION:  1. No metastatic disease in the abdomen/pelvis      2. Mild epiploic appendagitis suggested about the distal descending  colon. Correlate with patient's symptoms.      3. Spondylolisthesis of L5 on S1.      4. At least moderate narrowing thecal sac L3/4.        Assessment & Recommendations:    Mao Ordoñez is 72 y/o male with:   - Prostate cancer:   --- progress from Yuko 3+3 to now  [place] 3+4 [id number]  --- Now on ADT+R/T  --- HOXB13 c.251G>A (p.Wdj09Qll): heterozygous: Increased Risk Allele  - Family history of multiple prostate cancer: father, 2 brothers  - Family history of early breast cancer: daughter: 46 y/o     - HTN, CAD s/p CABG, 2nd degree AV block    Recommendation:  ### Positive .results   1. Interpretation of the positive results  -- discussed the impact to the siblings  -- discussed the impact to the offsprings    The results are otherwise negative among the genes tested with current technology and knowledge limitations.     There is no specific treatment or surveillance guideline for the patient at this moment. The patient should be treated as the general population     For full siblings with the same disease, the probability of identical by descent is 25%, so there is still a 50% chance that they may carry a disease risk  gene.    ## Follow up as needed  The patient does not need to be actively followed up with genetics, but if the patient has further questions or new concerns arise, we are happy to schedule another clinic visit.              Attending Physician Statement:        The history was reviewed with the family and is detailed above. I performed a physical examination which is documented above. The impression and plan were  reviewed with the patient/family extensively and are documented above. I have reviewed and edited the above note. Greater than 50% of the time was spent on patient counseling and coordination of care.    Prep 7 minutes  Face to face 60 minutes  Additional Patient Care Activities 0 minutes  Documentation 8 minutes  Other 0 minutes  Total 75 minutes     --  Yulia House MD, PhD  Director, Urogenetics Program, Department of Genetics and Genome Sciences  Chief, Urogenetics Division, Urology [place]   and Attending in Genetics and Urology  [facility], [facility], and [facility]

## 2024-07-23 NOTE — LETTER
07/23/24    Shadi Hazel MD  [address]  [place] [address]      Dear Dr. Shadi Hazel MD,    I am writing to confirm that your patient, Mao Ordoñez  received care in my office on 07/23/24. I have enclosed a summary of the care provided to Mao for your reference.    Please contact me with any questions you may have regarding the visit.    Sincerely,         Yulia House MD PhD  [address]  [place] [facility] [address]    CC: No Recipients

## 2024-08-28 ENCOUNTER — TELEPHONE (OUTPATIENT)
Dept: RADIATION ONCOLOGY | Facility: CLINIC | Age: 74
End: 2024-08-28
Payer: MEDICARE

## 2024-08-28 ENCOUNTER — HOSPITAL ENCOUNTER (OUTPATIENT)
Dept: RADIATION ONCOLOGY | Facility: CLINIC | Age: 74
Setting detail: RADIATION/ONCOLOGY SERIES
End: 2024-08-28
Payer: MEDICARE

## 2024-08-28 NOTE — TELEPHONE ENCOUNTER
"Patient called to inquire on the effects of radiation and how long does radiation last in the body.     Verified name/    Informed patient the live radiation is omitted from the body the second the machine turns off, he is not radioactive, and he cannot transmit radiation to others.  While radiation is not present in the body, its effects of will continue to work for weeks after completing treatment.  Per Dr. Milian, there is no way to give an exact date as to when the effects stop.    Patient reports his blood pressure has been elevated recently and is concerned this is a direct reflection of radiation.  Encouraged patient to reach out to prescribing MD of BP meds, as this is not from prostate radiation.    Patient expressed concern of Lupron injections causing an increase in BP.  Encouraged patient to contact Dr. Ceballos's office, as he is the prescriber of Lupron and can further answer specifics.     Patient had b/l iguinal herniarrhaphy in 2020 and is concerned radiation will have somehow \"messed up the mesh\" Informed patient radiation is a precise targeted therapy that is designed to only treat the area of concern.      Phone call visit scheduled with Dr. Milian by  to answer further questions for patient today at 9:30 AM.  Patient requested to cancel phone call visit and denied further questions.     RAJ SANTIAGO RN    "

## 2024-08-29 ENCOUNTER — HOSPITAL ENCOUNTER (EMERGENCY)
Age: 74
Discharge: HOME | End: 2024-08-29
Payer: MEDICARE

## 2024-08-29 ENCOUNTER — APPOINTMENT (OUTPATIENT)
Dept: GENERAL RADIOLOGY | Age: 74
End: 2024-08-29
Payer: MEDICARE

## 2024-08-29 VITALS
TEMPERATURE: 97.6 F | HEIGHT: 68 IN | BODY MASS INDEX: 28.79 KG/M2 | RESPIRATION RATE: 14 BRPM | SYSTOLIC BLOOD PRESSURE: 120 MMHG | HEART RATE: 69 BPM | OXYGEN SATURATION: 94 % | WEIGHT: 190 LBS | DIASTOLIC BLOOD PRESSURE: 84 MMHG

## 2024-08-29 DIAGNOSIS — I10 ESSENTIAL HYPERTENSION: Primary | ICD-10-CM

## 2024-08-29 LAB
ALBUMIN SERPL-MCNC: 3.5 G/DL (ref 3.5–4.6)
ALP SERPL-CCNC: 70 U/L (ref 35–104)
ALT SERPL-CCNC: 23 U/L (ref 0–41)
ANION GAP SERPL CALCULATED.3IONS-SCNC: 10 MEQ/L (ref 9–15)
AST SERPL-CCNC: 20 U/L (ref 0–40)
BASOPHILS # BLD: 0.3 K/UL (ref 0–0.2)
BASOPHILS NFR BLD: 4 %
BILIRUB SERPL-MCNC: 0.3 MG/DL (ref 0.2–0.7)
BUN SERPL-MCNC: 18 MG/DL (ref 8–23)
CALCIUM SERPL-MCNC: 9.1 MG/DL (ref 8.5–9.9)
CHLORIDE SERPL-SCNC: 103 MEQ/L (ref 95–107)
CO2 SERPL-SCNC: 24 MEQ/L (ref 20–31)
CREAT SERPL-MCNC: 1 MG/DL (ref 0.7–1.2)
EKG ATRIAL RATE: 66 BPM
EKG P AXIS: 3 DEGREES
EKG P-R INTERVAL: 300 MS
EKG Q-T INTERVAL: 448 MS
EKG QRS DURATION: 100 MS
EKG QTC CALCULATION (BAZETT): 469 MS
EKG R AXIS: -22 DEGREES
EKG T AXIS: 15 DEGREES
EKG VENTRICULAR RATE: 66 BPM
EOSINOPHIL # BLD: 0.9 K/UL (ref 0–0.7)
EOSINOPHIL NFR BLD: 14 %
ERYTHROCYTE [DISTWIDTH] IN BLOOD BY AUTOMATED COUNT: 13.6 % (ref 11.5–14.5)
GLOBULIN SER CALC-MCNC: 3.8 G/DL (ref 2.3–3.5)
GLUCOSE SERPL-MCNC: 102 MG/DL (ref 70–99)
HCT VFR BLD AUTO: 35.8 % (ref 42–52)
HGB BLD-MCNC: 11.9 G/DL (ref 14–18)
LYMPHOCYTES # BLD: 1.3 K/UL (ref 1–4.8)
LYMPHOCYTES NFR BLD: 20 %
MAGNESIUM SERPL-MCNC: 1.9 MG/DL (ref 1.7–2.4)
MCH RBC QN AUTO: 31.6 PG (ref 27–31.3)
MCHC RBC AUTO-ENTMCNC: 33.2 % (ref 33–37)
MCV RBC AUTO: 95 FL (ref 79–92.2)
MONOCYTES # BLD: 0.6 K/UL (ref 0.2–0.8)
MONOCYTES NFR BLD: 8.6 %
NEUTROPHILS # BLD: 3.5 K/UL (ref 1.4–6.5)
NEUTS SEG NFR BLD: 53 %
PLATELET # BLD AUTO: 174 K/UL (ref 130–400)
PLATELET BLD QL SMEAR: ADEQUATE
POTASSIUM SERPL-SCNC: 4.4 MEQ/L (ref 3.4–4.9)
PROT SERPL-MCNC: 7.3 G/DL (ref 6.3–8)
RBC # BLD AUTO: 3.77 M/UL (ref 4.7–6.1)
RBC MORPH BLD: NORMAL
SODIUM SERPL-SCNC: 137 MEQ/L (ref 135–144)
TROPONIN, HIGH SENSITIVITY: 12 NG/L (ref 0–19)
TSH REFLEX: 2.23 UIU/ML (ref 0.44–3.86)
WBC # BLD AUTO: 6.6 K/UL (ref 4.8–10.8)

## 2024-08-29 PROCEDURE — 84484 ASSAY OF TROPONIN QUANT: CPT

## 2024-08-29 PROCEDURE — 83735 ASSAY OF MAGNESIUM: CPT

## 2024-08-29 PROCEDURE — 93010 ELECTROCARDIOGRAM REPORT: CPT | Performed by: INTERNAL MEDICINE

## 2024-08-29 PROCEDURE — 71045 X-RAY EXAM CHEST 1 VIEW: CPT

## 2024-08-29 PROCEDURE — 85025 COMPLETE CBC W/AUTO DIFF WBC: CPT

## 2024-08-29 PROCEDURE — 93005 ELECTROCARDIOGRAM TRACING: CPT | Performed by: PERSONAL EMERGENCY RESPONSE ATTENDANT

## 2024-08-29 PROCEDURE — 99285 EMERGENCY DEPT VISIT HI MDM: CPT

## 2024-08-29 PROCEDURE — 80053 COMPREHEN METABOLIC PANEL: CPT

## 2024-08-29 PROCEDURE — 84443 ASSAY THYROID STIM HORMONE: CPT

## 2024-08-29 RX ORDER — TAMSULOSIN HYDROCHLORIDE 0.4 MG/1
0.4 CAPSULE ORAL DAILY
COMMUNITY

## 2024-08-29 RX ORDER — RANOLAZINE 500 MG/1
500 TABLET, EXTENDED RELEASE ORAL 2 TIMES DAILY
COMMUNITY

## 2024-08-29 ASSESSMENT — ENCOUNTER SYMPTOMS
RHINORRHEA: 0
VOMITING: 0
COLOR CHANGE: 0
ABDOMINAL PAIN: 0
SORE THROAT: 0
SHORTNESS OF BREATH: 0
DIARRHEA: 0
BLOOD IN STOOL: 0
COUGH: 0
NAUSEA: 0

## 2024-08-29 ASSESSMENT — PAIN - FUNCTIONAL ASSESSMENT: PAIN_FUNCTIONAL_ASSESSMENT: NONE - DENIES PAIN

## 2024-08-29 NOTE — ED TRIAGE NOTES
Pt presents to ED via private vehicle with co HTN. Pt also states he is having slight chest pressure. Pt has been working with his PCP on getting his BP under control and was told to take an extra 20mg lisinopril tonight. Pt states he woke up thinking his BP was elevated and he checked it at home and it was 173/107.

## 2024-08-29 NOTE — ED PROVIDER NOTES
Vancomycin    FAMILY HISTORY       Family History   Problem Relation Age of Onset    High Blood Pressure Mother     High Cholesterol Father     Other Father         thyroid    Other Sister         thyroid    Other Brother         throid          SOCIAL HISTORY       Social History     Socioeconomic History    Marital status:      Spouse name: None    Number of children: None    Years of education: None    Highest education level: None   Occupational History    Occupation: [employer]      Comment: [employer]   Tobacco Use    Smoking status: Never    Smokeless tobacco: Never   Substance and Sexual Activity    Alcohol use: Yes     Types: 2 Glasses of wine, 2 Cans of beer per week     Comment: rare    Drug use: No     Social Determinants of Health     Financial Resource Strain: Low Risk  (3/2/2023)    Received from [facility]    Overall Financial Resource Strain (CARDIA)     Difficulty of Paying Living Expenses: Not hard at all   Food Insecurity: No Food Insecurity (10/25/2021)    Hunger Vital Sign     Worried About Running Out of Food in the Last Year: Never true     Ran Out of Food in the Last Year: Never true   Transportation Needs: No Transportation Needs (1/30/2020)    Received from [facility]    PRAPARE - Transportation     Lack of Transportation (Medical): No     Lack of Transportation (Non-Medical): No         PHYSICAL EXAM         ED Triage Vitals [08/29/24 0356]   BP Systolic BP Percentile Diastolic BP Percentile Temp Temp Source Pulse Respirations SpO2   (!) 143/92 -- -- 97.6 °F (36.4 °C) Oral 79 18 96 %      Height Weight - Scale         1.727 m (5' 8\") 86.2 kg (190 lb)             Physical Exam  Constitutional:       Appearance: He is well-developed.   HENT:      Head: Normocephalic and atraumatic.   Eyes:      Conjunctiva/sclera: Conjunctivae normal.      Pupils: Pupils are equal, round, and reactive to light.   Neck:      Trachea: No tracheal deviation.   Cardiovascular:       limits   COMPREHENSIVE METABOLIC PANEL - Abnormal; Notable for the following components:    Glucose 102 (*)     Globulin 3.8 (*)     All other components within normal limits   TROPONIN   MAGNESIUM   TSH WITH REFLEX       All other labs were within normal range or not returned as of this dictation.    EMERGENCY DEPARTMENT COURSE and DIFFERENTIAL DIAGNOSIS/MDM:   Vitals:    Vitals:    08/29/24 0356 08/29/24 0400 08/29/24 0430 08/29/24 0500   BP: (!) 143/92 (!) 140/86 124/82 133/88   Pulse: 79 71 69 69   Resp: 18 18 13 14   Temp: 97.6 °F (36.4 °C)      TempSrc: Oral      SpO2: 96% 94% 95% 96%   Weight: 86.2 kg (190 lb)      Height: 1.727 m (5' 8\")            MDM    Patient presents with hypertension at home although he has been normotensive in the emergency room    Radiologist interpreting:  Chest x-ray shows no acute process    Blood work does show worsening anemia gradually over time.  Patient made aware to follow-up with his family doctor concerning this.  He has been asymptomatic throughout his visit and again normotensive.  Aware to follow-up with his cardiologist regarding his blood pressure medications.    Standard anticipatory guidance given to patient upon discharge. Have given them a specific time frame in which to follow-up and who to follow-up with. I have also advised them that they should return to the emergency department if they get worse, or not getting better or develop any new or concerning symptoms. Patient demonstrates understanding.        CRITICAL CARE TIME   Total Critical Caretime was 0 minutes, excluding separately reportable procedures.  There was a high probability of clinically significant/life threatening deterioration in the patient's condition which required my urgent intervention.        Procedures    FINAL IMPRESSION      1. Essential hypertension          DISPOSITION/PLAN   DISPOSITION Decision To Discharge 08/29/2024 05:44:20 AM  Condition at Disposition: Good      PATIENT REFERRED

## 2024-09-18 DIAGNOSIS — E03.9 ACQUIRED HYPOTHYROIDISM: ICD-10-CM

## 2024-09-18 RX ORDER — LEVOTHYROXINE SODIUM 100 UG/1
100 TABLET ORAL DAILY
Qty: 90 TABLET | Refills: 2 | Status: SHIPPED | OUTPATIENT
Start: 2024-09-18

## 2024-09-18 NOTE — TELEPHONE ENCOUNTER
Patient needs appointment         Rx Refill Request Telephone Encounter    Name:  Mao Ordoñez  :  [id number]    Specific Pharmacy location:  [facility]   Date of last appointment:  23  Date of next appointment:  N/A

## 2024-09-25 ENCOUNTER — TELEPHONE (OUTPATIENT)
Dept: RADIATION ONCOLOGY | Facility: HOSPITAL | Age: 74
End: 2024-09-25
Payer: MEDICARE

## 2024-09-26 ENCOUNTER — LAB (OUTPATIENT)
Dept: LAB | Facility: LAB | Age: 74
End: 2024-09-26
Payer: MEDICARE

## 2024-09-26 DIAGNOSIS — C61 PROSTATE CANCER (MULTI): ICD-10-CM

## 2024-09-26 PROCEDURE — 36415 COLL VENOUS BLD VENIPUNCTURE: CPT

## 2024-09-26 PROCEDURE — 84153 ASSAY OF PSA TOTAL: CPT

## 2024-09-26 PROCEDURE — 84403 ASSAY OF TOTAL TESTOSTERONE: CPT

## 2024-09-26 PROCEDURE — 84154 ASSAY OF PSA FREE: CPT

## 2024-09-27 LAB — TESTOST SERPL-MCNC: <30 NG/DL (ref 240–1000)

## 2024-09-28 LAB
PSA FREE MFR SERPL: NORMAL %
PSA FREE SERPL-MCNC: <0.1 NG/ML
PSA SERPL IA-MCNC: <0.1 NG/ML (ref 0–4)

## 2024-09-30 NOTE — PROGRESS NOTES
Cancer synopsis:  Rad/onc: Dr. Milian/Shaq VERA    74 y.o. male with unfavorable-intermediate risk prostate cancer, [id number], Yuko score 3+4=7, 11/16 positive cores, pPSA 12.76, Grade group 2      He was on active surveillance for low risk prostate cancer since 9/19/2022. Most recently underwent a biopsy  which demonstrated Yuko score 3+4=7, 11/16 positive cores.  MRI of the prostate was not completed. PSMA PET/CT Focally increased PSMA expression within the left prostate gland, consistent with prostate cancer. No metastatic disease present.    04/15/2024 st term lupron injection (plan for 6m)    VMAT prostate and SV 06/05/2024    History of presenting illness:    Patient ID: [id number]     Mao Ordoñez is a 74 y.o. male who presents for  UIR prostate cancer HGG, [id number] IPSA 12.76    RT Site: prostate and SV  RT Date: 06/05/2024  Hormone therapy: Yes, st term (given 06/05/2024)  Hot Flushes: Admits, however reports tolerable. States occurs whenever doing any type of physical exertion  Fatigue: Denies  Bone pain: Denies  ED: Loss of sexual function since hormonal therapy  - Quality of erections during the last 4 weeks: 1 = None at all  - Use of erectile dysfunction medications:  None  IPSS: N/a phone/virtual  Urinary symptoms: Denies dysuria, hematuria, frequency or urgency or urine leakage. Has tried stopping flowmax but noted weak stream and frequency returned.   Urinary Medications: Yes, flowmax daily  Colonoscopy: 10/2014 un remarkable next due in 1 yr d/t recent RT  Rectal bleeding: Denies  Other systems: Denies SOB, CP or fever. Does report 10lb weight gain during hormonal therapy.    Review of systems:  Review of Systems   Constitutional:  Negative for fatigue, fever and unexpected weight change.   Respiratory:  Negative for cough, chest tightness and shortness of breath.    Cardiovascular:  Negative for chest pain, palpitations and leg swelling.   Gastrointestinal:  Negative for abdominal pain,  anal bleeding, blood in stool, constipation, diarrhea and rectal pain.   Endocrine: Negative for cold intolerance, heat intolerance and polyuria.   Genitourinary:  Negative for decreased urine volume, difficulty urinating, dysuria, frequency, hematuria and urgency.   Musculoskeletal:  Negative for arthralgias, back pain, gait problem and joint swelling.   Skin: Negative.    Allergic/Immunologic: Negative.    Neurological:  Negative for dizziness, syncope and weakness.   Psychiatric/Behavioral: Negative.       PERFORMANCE STATUS:  KPS/ECO, Able to carry on normal activity; minor signs or symptoms of disease (ECOG equivalent 0)    Past Medical history  Past Medical History:   Diagnosis Date    Heart disease     Hyperlipidemia     Hypertension     Hypothyroidism       Surgical/family history  Family History   Problem Relation Name Age of Onset    Hypertension Mother      Hyperlipidemia Father      Hypothyroidism Father      Hyperlipidemia Other sibling     Hypothyroidism Other sibling       Past Surgical History:   Procedure Laterality Date    CORONARY ARTERY BYPASS GRAFT      5 vessels    CT ANGIO NECK  2023    CT NECK ANGIO W AND WO IV CONTRAST 2023    CT HEAD ANGIO W AND WO IV CONTRAST  2023    CT HEAD ANGIO W AND WO IV CONTRAST 2023    HERNIA REPAIR      MENISCECTOMY      OTHER SURGICAL HISTORY  2019    Bypass    OTHER SURGICAL HISTORY  2020    Hernia repair    OTHER SURGICAL HISTORY  2020    Vasectomy    OTHER SURGICAL HISTORY  2020    Colonoscopy      Social History  Tobacco Use: Low Risk  (2024)    Received from [facility]    Patient History     Smoking Tobacco Use: Never     Smokeless Tobacco Use: Never     Passive Exposure: Not on file       Current med list:  Current Outpatient Medications   Medication Instructions    aspirin 162 mg, oral, Once    atorvastatin (LIPITOR) 80 mg, oral, Daily    ezetimibe (ZETIA) 10 mg, oral, Daily    levothyroxine  (SYNTHROID, LEVOXYL) 100 mcg, oral, Daily    lisinopril 20 mg, oral, Daily    loratadine (Claritin) 10 mg tablet 1 tablet, oral, Daily    Lupron Depot (6 Month) 45 mg, intramuscular, Once    metoprolol succinate XL (TOPROL-XL) 25 mg, oral, Daily    nitroglycerin (NITROSTAT) 0.4 mg, sublingual, As needed, DISSOLVE 1 TABLET UNDER THE TONGUE AS NEEDED FOR CHEST PAIN. IF NO PAIN RELIEF CALL 911.    ranolazine (RANEXA) 500 mg, oral, 2 times daily    tadalafil (CIALIS) 20 mg, oral, Every 72 hours PRN, Maximum 1 tablet in 3 days    tamsulosin (FLOMAX) 0.4 mg, oral, Daily    triamcinolone acetonide 0.025 % lotion topical (top), 2 times daily      Last recorded vital:  N/a phone/virtual    Physical exam  N/a phone/virtual    Pertinent labs:  PSA   Date/Time Value Ref Range Status   09/26/2024 03:20 PM <0.1 0.0 - 4.0 ng/mL Final     Comment:     INTERPRETIVE INFORMATION: Prostate Specific Antigen    The Roche PSA electrochemiluminescent immunoassay is used. Results   obtained with different test methods or kits cannot be used   interchangeably. The Roche PSA method is approved for use as an   aid in the detection of prostate cancer when used in conjunction   with a digital rectal exam in individuals with a prostate age 50   years and older. The Roche PSA is also indicated for the serial   measurement of PSA to aid in the prognosis and management of   prostate cancer patients. Elevated PSA concentrations can only   suggest the presence of prostate cancer until biopsy is performed.   PSA concentrations can also be elevated in benign prostatic   hyperplasia or inflammatory conditions of the prostate. PSA is   generally not elevated in healthy individuals or individuals with   nonprostatic carcinoma.     Testosterone   Date/Time Value Ref Range Status   09/26/2024 03:20 PM <30 (L) 240 - 1,000 ng/dL Final     Comment:     Testing by a more sensitive method (Testosterone Total LC-MS/MS)is recommended for accurate quantification of  Testosterone levels less than 60 ng/dL.      Dx:  Problem List Items Addressed This Visit    None  Visit Diagnoses       Malignant neoplasm of prostate (Multi)    -  Primary        PSA of <0.10 was reviewed and is undectable. Testosterone <30 as expected while on hormonal therapy. Review of latent SE including rectal bleeding, hematuria, urinary strictures, ED where reviewed as well as how to contact office if s/s present. Denies latent SE. NCCN guidelines where reviewed and routine FUV of every 3m for first year and every 6m for four years for a total of five years was discussed. Patient verbalized understanding.      Recommend vitamin D supplementation, start (or increase by) 400-1000 units daily. Reviewed importance of intake while on Testerone lowering therapy as well as after until Testerone re-establishes, at which point may stop if DEXA indicative of normal bone density. Also reviewed that individuals at a higher risk such as those over the age of 75 or those with a hx of bone fx, osteoporosis or osteopenia to continue supplementation indefinitely with routine DEXA imaging as per national guidelines to assess bone health continually.    Discussed guidelines for 150 minutes of moderate exercise a week or 75 minutes of Vigorous exercise to help control and reduce weight gain.     PLAN:  FUV 4m  Labs Psa and testosterone in 4m  Imaging none  FUV other providers: PCP for routine evals    Please contact office with any concerns:  Benjamin Tabares [facility]  [phone]    I performed this visit using realtime telehealth tools, including an audio/video OR telephone connection between patient’s name and location Mao Ordoñez and Benjamin [facility].    2. POS 10: Telehealth provided in patient's home.  o Patient is located in their home (which is a location other than a hospital or other  facility where the patient receives care in a private residence) when receiving  health services or health related services  through telecommunication technology.

## 2024-10-01 ENCOUNTER — APPOINTMENT (OUTPATIENT)
Dept: RADIATION ONCOLOGY | Facility: CLINIC | Age: 74
End: 2024-10-01
Payer: MEDICARE

## 2024-10-01 ENCOUNTER — HOSPITAL ENCOUNTER (OUTPATIENT)
Dept: RADIATION ONCOLOGY | Facility: HOSPITAL | Age: 74
Setting detail: RADIATION/ONCOLOGY SERIES
Discharge: HOME | End: 2024-10-01
Payer: MEDICARE

## 2024-10-01 DIAGNOSIS — C61 MALIGNANT NEOPLASM OF PROSTATE (MULTI): Primary | ICD-10-CM

## 2024-10-01 PROCEDURE — 99213 OFFICE O/P EST LOW 20 MIN: CPT

## 2024-10-01 ASSESSMENT — ENCOUNTER SYMPTOMS
FATIGUE: 0
ALLERGIC/IMMUNOLOGIC NEGATIVE: 1
ABDOMINAL PAIN: 0
PALPITATIONS: 0
BACK PAIN: 0
PSYCHIATRIC NEGATIVE: 1
SHORTNESS OF BREATH: 0
UNEXPECTED WEIGHT CHANGE: 0
HEMATURIA: 0
WEAKNESS: 0
CHEST TIGHTNESS: 0
BLOOD IN STOOL: 0
JOINT SWELLING: 0
COUGH: 0
DYSURIA: 0
DIARRHEA: 0
DIFFICULTY URINATING: 0
FREQUENCY: 0
CONSTIPATION: 0
ARTHRALGIAS: 0
DIZZINESS: 0
ANAL BLEEDING: 0
FEVER: 0
RECTAL PAIN: 0

## 2024-12-30 ENCOUNTER — LAB (OUTPATIENT)
Dept: LAB | Facility: LAB | Age: 74
End: 2024-12-30
Payer: MEDICARE

## 2024-12-30 DIAGNOSIS — C61 MALIGNANT NEOPLASM OF PROSTATE (MULTI): ICD-10-CM

## 2024-12-30 LAB
PSA SERPL-MCNC: <0.01 NG/ML
TESTOST SERPL-MCNC: <30 NG/DL (ref 240–1000)

## 2024-12-30 PROCEDURE — 84153 ASSAY OF PSA TOTAL: CPT

## 2024-12-30 PROCEDURE — 84403 ASSAY OF TOTAL TESTOSTERONE: CPT

## 2024-12-31 ENCOUNTER — PATIENT MESSAGE (OUTPATIENT)
Dept: PRIMARY CARE | Facility: CLINIC | Age: 74
End: 2024-12-31
Payer: MEDICARE

## 2025-01-02 ENCOUNTER — TELEPHONE (OUTPATIENT)
Dept: RADIATION ONCOLOGY | Facility: HOSPITAL | Age: 75
End: 2025-01-02
Payer: MEDICARE

## 2025-01-02 ASSESSMENT — ENCOUNTER SYMPTOMS
FEVER: 0
DIARRHEA: 0
DYSURIA: 0
BACK PAIN: 0
DIFFICULTY URINATING: 0
CONSTIPATION: 0
ARTHRALGIAS: 0
SHORTNESS OF BREATH: 0
RECTAL PAIN: 0
UNEXPECTED WEIGHT CHANGE: 0
ANAL BLEEDING: 0
COUGH: 0
HEMATURIA: 0
FREQUENCY: 0
DIZZINESS: 0
FATIGUE: 0
BLOOD IN STOOL: 0
CHEST TIGHTNESS: 0
JOINT SWELLING: 0
ALLERGIC/IMMUNOLOGIC NEGATIVE: 1
PSYCHIATRIC NEGATIVE: 1
PALPITATIONS: 0
ABDOMINAL PAIN: 0
WEAKNESS: 0

## 2025-01-02 NOTE — PROGRESS NOTES
Cancer synopsis:  Rad/onc: Dr. Milian/Shaq VERA    74 y.o. male with unfavorable-intermediate risk prostate cancer, [id number], Yuko score 3+4=7, 11/16 positive cores, pPSA 12.76, Grade group 2      He was on active surveillance for low risk prostate cancer since 9/19/2022. Most recently underwent a biopsy  which demonstrated Yuko score 3+4=7, 11/16 positive cores.  MRI of the prostate was not completed. PSMA PET/CT Focally increased PSMA expression within the left prostate gland, consistent with prostate cancer. No metastatic disease present.    04/15/2024 st term lupron injection (plan for 6m)    VMAT prostate and SV 06/05/2024    History of presenting illness:    Patient ID: [id number]     Mao Ordoñez is a 74 y.o. male who presents for  UIR prostate cancer HGG, [id number] IPSA 12.76    RT Site: prostate and SV  RT Date: 06/05/2024  Hormone therapy: Yes, st term (given 04/15/2024)  Hot Flushes: Admits, however reports tolerable. States occurs whenever doing any type of physical exertion  Fatigue: Denies  Bone pain: Denies  ED: Loss of sexual function since hormonal therapy  - Quality of erections during the last 4 weeks: 1 = None at all  - Use of erectile dysfunction medications:  None  IPSS: N/a phone/virtual  Urinary symptoms: Denies dysuria, hematuria, frequency or urgency or urine leakage. Has stopped flowmax. Noted some mild stream weakening however not concerned.  Urinary Medications: No  Colonoscopy: 10/2014 un remarkable next due in 2025 d/t recent RT  Rectal bleeding: Denies  Other systems: Denies SOB, CP or fever. Does report 10lb weight gain during hormonal therapy.    Review of systems:  Review of Systems   Constitutional:  Negative for fatigue, fever and unexpected weight change.   Respiratory:  Negative for cough, chest tightness and shortness of breath.    Cardiovascular:  Negative for chest pain, palpitations and leg swelling.   Gastrointestinal:  Negative for abdominal pain, anal bleeding,  blood in stool, constipation, diarrhea and rectal pain.   Endocrine: Negative for cold intolerance, heat intolerance and polyuria.   Genitourinary:  Negative for decreased urine volume, difficulty urinating, dysuria, frequency, hematuria and urgency.   Musculoskeletal:  Negative for arthralgias, back pain, gait problem and joint swelling.   Skin: Negative.    Allergic/Immunologic: Negative.    Neurological:  Negative for dizziness, syncope and weakness.   Psychiatric/Behavioral: Negative.       PERFORMANCE STATUS:  KPS/ECO, Able to carry on normal activity; minor signs or symptoms of disease (ECOG equivalent 0)    Past Medical history  Past Medical History:   Diagnosis Date    Heart disease     Hyperlipidemia     Hypertension     Hypothyroidism       Surgical/family history  Family History   Problem Relation Name Age of Onset    Hypertension Mother      Hyperlipidemia Father      Hypothyroidism Father      Hyperlipidemia Other sibling     Hypothyroidism Other sibling       Past Surgical History:   Procedure Laterality Date    CORONARY ARTERY BYPASS GRAFT      5 vessels    CT ANGIO NECK  2023    CT NECK ANGIO W AND WO IV CONTRAST 2023    CT HEAD ANGIO W AND WO IV CONTRAST  2023    CT HEAD ANGIO W AND WO IV CONTRAST 2023    HERNIA REPAIR      MENISCECTOMY      OTHER SURGICAL HISTORY  2019    Bypass    OTHER SURGICAL HISTORY  2020    Hernia repair    OTHER SURGICAL HISTORY  2020    Vasectomy    OTHER SURGICAL HISTORY  2020    Colonoscopy      Social History  Tobacco Use: Low Risk  (2024)    Received from [facility]    Patient History     Smoking Tobacco Use: Never     Smokeless Tobacco Use: Never     Passive Exposure: Not on file       Current med list:  Current Outpatient Medications   Medication Instructions    aspirin 162 mg, oral, Once    atorvastatin (LIPITOR) 80 mg, oral, Daily    ezetimibe (ZETIA) 10 mg, oral, Daily    levothyroxine (SYNTHROID,  LEVOXYL) 100 mcg, oral, Daily    lisinopril 20 mg, oral, 2 times daily    loratadine (Claritin) 10 mg tablet 1 tablet, oral, Daily    Lupron Depot (6 Month) 45 mg, intramuscular, Once    metoprolol succinate XL (TOPROL-XL) 25 mg, oral, Daily    nitroglycerin (NITROSTAT) 0.4 mg, sublingual, As needed, DISSOLVE 1 TABLET UNDER THE TONGUE AS NEEDED FOR CHEST PAIN. IF NO PAIN RELIEF CALL 911.    ranolazine (RANEXA) 500 mg, oral, 2 times daily    tadalafil (CIALIS) 20 mg, oral, Every 72 hours PRN, Maximum 1 tablet in 3 days    tamsulosin (FLOMAX) 0.4 mg, oral, Daily    triamcinolone acetonide 0.025 % lotion topical (top), 2 times daily      Last recorded vital:  N/a phone/virtual    Physical exam  N/a phone/virtual    Pertinent labs:  Prostate Specific AG   Date/Time Value Ref Range Status   12/30/2024 10:48 AM <0.01 <=4.00 ng/mL Final     Testosterone   Date/Time Value Ref Range Status   12/30/2024 10:48 AM <30 (L) 240 - 1,000 ng/dL Final     Comment:     Testing by a more sensitive method (Testosterone Total LC-MS/MS)is recommended for accurate quantification of Testosterone levels less than 60 ng/dL.      Dx:  Problem List Items Addressed This Visit    None  Visit Diagnoses       Malignant neoplasm of prostate (Multi)    -  Primary    Relevant Orders    Clinic Appointment Request Follow Up; KRANTHI CEJA (virtual); [facility] [address] [facility] (virtual) (Completed)        PSA of <0.01 was reviewed and is undectable. Testosterone <30 as expected while on hormonal therapy. Review of latent SE including rectal bleeding, hematuria, urinary strictures, ED where reviewed as well as how to contact office if s/s present. Denies latent SE. NCCN guidelines where reviewed and routine FUV of every 3m for first year and every 6m for four years for a total of five years was discussed. Patient verbalized understanding.      Recommend vitamin D supplementation, start (or increase by) 400-1000 units daily. Reviewed importance of intake while  on Testerone lowering therapy as well as after until Testerone re-establishes, at which point may stop if DEXA indicative of normal bone density. Also reviewed that individuals at a higher risk such as those over the age of 75 or those with a hx of bone fx, osteoporosis or osteopenia to continue supplementation indefinitely with routine DEXA imaging as per national guidelines to assess bone health continually.    Discussed guidelines for 150 minutes of moderate exercise a week or 75 minutes of Vigorous exercise to help control and reduce weight gain.     PLAN:  FUV 4m  Labs Psa and testosterone in 4m  Imaging none  FUV other providers: PCP for routine evals    Please contact office with any concerns:  Benjamin Tabares [facility]  [phone]    I performed this visit using realtime telehealth tools, including an audio/video OR telephone connection between patient’s name and location Mao Ordoñez and Benjamin [facility].    2. POS 10: Telehealth provided in patient's home.  o Patient is located in their home (which is a location other than a hospital or other facility where the patient receives care in a private residence) when receiving health services or health related services through telecommunication technology.

## 2025-01-03 ENCOUNTER — HOSPITAL ENCOUNTER (OUTPATIENT)
Dept: RADIATION ONCOLOGY | Facility: HOSPITAL | Age: 75
Setting detail: RADIATION/ONCOLOGY SERIES
Discharge: HOME | End: 2025-01-03
Payer: MEDICARE

## 2025-01-03 DIAGNOSIS — C61 MALIGNANT NEOPLASM OF PROSTATE (MULTI): Primary | ICD-10-CM

## 2025-01-03 PROCEDURE — 99213 OFFICE O/P EST LOW 20 MIN: CPT

## 2025-01-03 PROCEDURE — 99213 OFFICE O/P EST LOW 20 MIN: CPT | Mod: 95

## 2025-03-03 ENCOUNTER — OFFICE VISIT (OUTPATIENT)
Dept: URGENT CARE | Age: 75
End: 2025-03-03
Payer: MEDICARE

## 2025-03-03 VITALS
HEIGHT: 69 IN | HEART RATE: 78 BPM | DIASTOLIC BLOOD PRESSURE: 80 MMHG | SYSTOLIC BLOOD PRESSURE: 120 MMHG | WEIGHT: 186 LBS | OXYGEN SATURATION: 95 % | RESPIRATION RATE: 20 BRPM | BODY MASS INDEX: 27.55 KG/M2 | TEMPERATURE: 98.4 F

## 2025-03-03 DIAGNOSIS — J10.1 INFLUENZA A: Primary | ICD-10-CM

## 2025-03-03 DIAGNOSIS — R68.89 FLU-LIKE SYMPTOMS: ICD-10-CM

## 2025-03-03 LAB
POC RAPID INFLUENZA A: POSITIVE
POC RAPID INFLUENZA B: NEGATIVE

## 2025-03-03 PROCEDURE — 87804 INFLUENZA ASSAY W/OPTIC: CPT

## 2025-03-03 PROCEDURE — 1123F ACP DISCUSS/DSCN MKR DOCD: CPT

## 2025-03-03 PROCEDURE — 1036F TOBACCO NON-USER: CPT

## 2025-03-03 PROCEDURE — 1160F RVW MEDS BY RX/DR IN RCRD: CPT

## 2025-03-03 PROCEDURE — 1159F MED LIST DOCD IN RCRD: CPT

## 2025-03-03 PROCEDURE — 3079F DIAST BP 80-89 MM HG: CPT

## 2025-03-03 PROCEDURE — 99203 OFFICE O/P NEW LOW 30 MIN: CPT

## 2025-03-03 PROCEDURE — 3074F SYST BP LT 130 MM HG: CPT

## 2025-03-03 RX ORDER — PREDNISONE 20 MG/1
40 TABLET ORAL DAILY
Qty: 10 TABLET | Refills: 0 | Status: SHIPPED | OUTPATIENT
Start: 2025-03-03 | End: 2025-03-08

## 2025-03-03 RX ORDER — OSELTAMIVIR PHOSPHATE 75 MG/1
75 CAPSULE ORAL EVERY 12 HOURS
Qty: 10 CAPSULE | Refills: 0 | Status: SHIPPED | OUTPATIENT
Start: 2025-03-03 | End: 2025-03-08

## 2025-03-03 RX ORDER — BENZONATATE 200 MG/1
200 CAPSULE ORAL 3 TIMES DAILY PRN
Qty: 30 CAPSULE | Refills: 0 | Status: SHIPPED | OUTPATIENT
Start: 2025-03-03

## 2025-03-03 ASSESSMENT — ENCOUNTER SYMPTOMS
GASTROINTESTINAL NEGATIVE: 1
COUGH: 1
FEVER: 1
DIARRHEA: 0
SINUS PRESSURE: 0
CHILLS: 0
FATIGUE: 1
ABDOMINAL PAIN: 0
NAUSEA: 0
HEADACHES: 1
VOMITING: 0
SORE THROAT: 1
CARDIOVASCULAR NEGATIVE: 1
RHINORRHEA: 1

## 2025-03-03 NOTE — PROGRESS NOTES
"Subjective   Patient ID: Mao Ordoñez is a 74 y.o. male. They present today with a chief complaint of Sinus Problem (Concerns present x 3 days), Cough (Non-productive), Nasal Congestion, and Chest Congestion.    History of Present Illness  Subjective  History was provided by the patient.  Mao Ordoñez is a 74 y.o. male who presents for evaluation of symptoms of a URI. Symptoms include chest congestion, dry cough, post nasal drip, and sinus and nasal congestion. Onset of symptoms was 3 days ago, unchanged since that time. Associated symptoms include low grade fever. He is drinking moderate amounts of fluids. Evaluation to date: none. Treatment to date: cough suppressants and decongestants    Objective  /80   Pulse 78   Temp 36.9 °C (98.4 °F) (Oral)   Resp 20   Ht 1.753 m (5' 9\")   Wt 84.4 kg (186 lb)   SpO2 95%   BMI 27.47 kg/m²   [unfilled]     Assessment/Plan  influenza    Discussed diagnosis and treatment of URI.  Suggested symptomatic OTC remedies.  Nasal saline spray for congestion.  Follow up as needed.        History provided by:  Patient and medical records      Past Medical History  Allergies as of 03/03/2025 - Reviewed 03/03/2025   Allergen Reaction Noted    Vancomycin Unknown 03/07/2023       (Not in a hospital admission)       Past Medical History:   Diagnosis Date    Heart disease     Hyperlipidemia     Hypertension     Hypothyroidism        Past Surgical History:   Procedure Laterality Date    CORONARY ARTERY BYPASS GRAFT      5 vessels    CT ANGIO NECK  06/17/2023    CT NECK ANGIO W AND WO IV CONTRAST 6/17/2023    CT HEAD ANGIO W AND WO IV CONTRAST  06/17/2023    CT HEAD ANGIO W AND WO IV CONTRAST 6/17/2023    HERNIA REPAIR      MENISCECTOMY      OTHER SURGICAL HISTORY  08/06/2019    Bypass    OTHER SURGICAL HISTORY  05/27/2020    Hernia repair    OTHER SURGICAL HISTORY  05/27/2020    Vasectomy    OTHER SURGICAL HISTORY  04/16/2020    Colonoscopy        reports that he has never " "smoked. He has never been exposed to tobacco smoke. He has never used smokeless tobacco. He reports current alcohol use. He reports that he does not use drugs.    Review of Systems  Review of Systems   Constitutional:  Positive for fatigue and fever. Negative for chills.   HENT:  Positive for congestion, rhinorrhea and sore throat. Negative for ear pain and sinus pressure.    Respiratory:  Positive for cough.    Cardiovascular: Negative.    Gastrointestinal: Negative.  Negative for abdominal pain, diarrhea, nausea and vomiting.   Genitourinary: Negative.    Neurological:  Positive for headaches.   All other systems reviewed and are negative.                                 Objective    Vitals:    03/03/25 1516   BP: 120/80   Pulse: 78   Resp: 20   Temp: 36.9 °C (98.4 °F)   TempSrc: Oral   SpO2: 95%   Weight: 84.4 kg (186 lb)   Height: 1.753 m (5' 9\")     No LMP for male patient.    Physical Exam  Vitals and nursing note reviewed.   Constitutional:       General: He is not in acute distress.     Appearance: Normal appearance. He is ill-appearing.   HENT:      Head: Atraumatic.      Right Ear: A middle ear effusion is present.      Left Ear: A middle ear effusion is present.      Nose: Mucosal edema, congestion and rhinorrhea present. Rhinorrhea is purulent.      Right Turbinates: Swollen.      Left Turbinates: Swollen.      Mouth/Throat:      Lips: Pink.      Mouth: Mucous membranes are moist.      Pharynx: Uvula midline. Posterior oropharyngeal erythema and postnasal drip present.   Cardiovascular:      Rate and Rhythm: Normal rate and regular rhythm.      Pulses: Normal pulses.      Heart sounds: Normal heart sounds.   Pulmonary:      Effort: Pulmonary effort is normal.      Breath sounds: Normal breath sounds and air entry. No decreased breath sounds or wheezing.   Musculoskeletal:      Cervical back: Normal range of motion and neck supple.   Skin:     General: Skin is warm and dry.      Capillary Refill: " Capillary refill takes less than 2 seconds.   Neurological:      Mental Status: He is alert and oriented to person, place, and time.   Psychiatric:         Behavior: Behavior normal.         Procedures    Point of Care Test & Imaging Results from this visit  No results found for this visit on 03/03/25.   No results found.    Diagnostic study results (if any) were reviewed by TAPAN Juárez.    Assessment/Plan   Allergies, medications, history, and pertinent labs/EKGs/Imaging reviewed by TAPAN Juárez.     Medical Decision Making  Risks, benefits, and alternatives of the medications and treatment plan prescribed today were discussed, and patient expressed understanding. Plan follow up as discussed or as needed if any worsening symptoms or change in condition. Reinforced red flags including (but not limited to): severe or worsening pain; difficulty swallowing; stiff neck; shortness of breath; coughing or vomiting blood; chest pain; and new or increased fever are indications to go to the Emergency Department.  At time of discharge patient was clinically well-appearing and HDS for outpatient management. The patient and/or family was educated regarding diagnosis, supportive care, OTC and Rx medications. The patient and/or family was given the opportunity to ask questions prior to discharge.  They verbalized understanding of my discussion of the plans for treatment, expected course, indications to return to  or seek further evaluation in ED, and the need for timely follow up as directed.   They were provided with a work/school excuse if requested. The after-visit summary was given to the patient and care instructions were reviewed with the patient. All questions were answered and the patient verbalized understanding of the plan of care for today.  Plan:  Recent visit notes reviewed  Meds as above  Increase clear fluids  Pcp follow up this week if not improving or worsening  ER visit anytime  24/7 for acute worsening or changing condition      Orders and Diagnoses  Diagnoses and all orders for this visit:  Influenza A  -     predniSONE (Deltasone) 20 mg tablet; Take 2 tablets (40 mg) by mouth once daily for 5 days.  -     oseltamivir (Tamiflu) 75 mg capsule; Take 1 capsule (75 mg) by mouth every 12 hours for 5 days.  -     benzonatate (Tessalon) 200 mg capsule; Take 1 capsule (200 mg) by mouth 3 times a day as needed for cough. Do not crush or chew.  Flu-like symptoms  -     POCT Influenza A/B manually resulted      Medical Admin Record      Patient disposition: Home    Electronically signed by TAPAN Juárez  7:55 PM

## 2025-04-16 ENCOUNTER — OFFICE VISIT (OUTPATIENT)
Dept: PRIMARY CARE | Facility: CLINIC | Age: 75
End: 2025-04-16
Payer: MEDICARE

## 2025-04-16 VITALS
OXYGEN SATURATION: 97 % | SYSTOLIC BLOOD PRESSURE: 112 MMHG | BODY MASS INDEX: 28.88 KG/M2 | HEIGHT: 69 IN | WEIGHT: 195 LBS | TEMPERATURE: 97.4 F | HEART RATE: 95 BPM | RESPIRATION RATE: 15 BRPM | DIASTOLIC BLOOD PRESSURE: 68 MMHG

## 2025-04-16 DIAGNOSIS — E78.2 MIXED HYPERLIPIDEMIA: ICD-10-CM

## 2025-04-16 DIAGNOSIS — G99.2 MYELOPATHY CONCURRENT WITH AND DUE TO SPINAL STENOSIS OF CERVICAL REGION: ICD-10-CM

## 2025-04-16 DIAGNOSIS — E03.9 ACQUIRED HYPOTHYROIDISM: ICD-10-CM

## 2025-04-16 DIAGNOSIS — Z00.00 ROUTINE GENERAL MEDICAL EXAMINATION AT A HEALTH CARE FACILITY: Primary | ICD-10-CM

## 2025-04-16 DIAGNOSIS — L23.9 ALLERGIC DERMATITIS: ICD-10-CM

## 2025-04-16 DIAGNOSIS — I10 ESSENTIAL HYPERTENSION: ICD-10-CM

## 2025-04-16 DIAGNOSIS — M48.02 MYELOPATHY CONCURRENT WITH AND DUE TO SPINAL STENOSIS OF CERVICAL REGION: ICD-10-CM

## 2025-04-16 PROCEDURE — 3074F SYST BP LT 130 MM HG: CPT | Performed by: FAMILY MEDICINE

## 2025-04-16 PROCEDURE — 1123F ACP DISCUSS/DSCN MKR DOCD: CPT | Performed by: FAMILY MEDICINE

## 2025-04-16 PROCEDURE — 1160F RVW MEDS BY RX/DR IN RCRD: CPT | Performed by: FAMILY MEDICINE

## 2025-04-16 PROCEDURE — 1159F MED LIST DOCD IN RCRD: CPT | Performed by: FAMILY MEDICINE

## 2025-04-16 PROCEDURE — 1036F TOBACCO NON-USER: CPT | Performed by: FAMILY MEDICINE

## 2025-04-16 PROCEDURE — 99214 OFFICE O/P EST MOD 30 MIN: CPT | Performed by: FAMILY MEDICINE

## 2025-04-16 PROCEDURE — 1170F FXNL STATUS ASSESSED: CPT | Performed by: FAMILY MEDICINE

## 2025-04-16 PROCEDURE — G0439 PPPS, SUBSEQ VISIT: HCPCS | Performed by: FAMILY MEDICINE

## 2025-04-16 PROCEDURE — 3078F DIAST BP <80 MM HG: CPT | Performed by: FAMILY MEDICINE

## 2025-04-16 RX ORDER — LEVOTHYROXINE SODIUM 100 UG/1
100 TABLET ORAL DAILY
Qty: 90 TABLET | Refills: 2 | Status: SHIPPED | OUTPATIENT
Start: 2025-04-16

## 2025-04-16 RX ORDER — NEOMYCIN SULFATE, POLYMYXIN B SULFATE AND DEXAMETHASONE 3.5; 10000; 1 MG/ML; [USP'U]/ML; MG/ML
SUSPENSION/ DROPS OPHTHALMIC
COMMUNITY
Start: 2024-08-27

## 2025-04-16 RX ORDER — METHYLPREDNISOLONE ACETATE 80 MG/ML
80 INJECTION, SUSPENSION INTRA-ARTICULAR; INTRALESIONAL; INTRAMUSCULAR; SOFT TISSUE ONCE
Status: COMPLETED | OUTPATIENT
Start: 2025-04-16 | End: 2025-04-16

## 2025-04-16 RX ADMIN — METHYLPREDNISOLONE ACETATE 80 MG: 80 INJECTION, SUSPENSION INTRA-ARTICULAR; INTRALESIONAL; INTRAMUSCULAR; SOFT TISSUE at 09:28

## 2025-04-16 ASSESSMENT — ENCOUNTER SYMPTOMS
WHEEZING: 0
ABDOMINAL PAIN: 0
COUGH: 0
FREQUENCY: 0
POLYDIPSIA: 0
ADENOPATHY: 0
BRUISES/BLEEDS EASILY: 0
POLYPHAGIA: 0
SORE THROAT: 0
FEVER: 0
DIZZINESS: 0
HEMATURIA: 0
SHORTNESS OF BREATH: 0
CHILLS: 0
DIARRHEA: 0
DYSURIA: 0
FATIGUE: 1
HEADACHES: 0
RHINORRHEA: 0
NUMBNESS: 0
VOMITING: 0
PALPITATIONS: 0
CONSTIPATION: 0
TREMORS: 0

## 2025-04-16 ASSESSMENT — ACTIVITIES OF DAILY LIVING (ADL)
DOING_HOUSEWORK: INDEPENDENT
TAKING_MEDICATION: INDEPENDENT
BATHING: INDEPENDENT
DRESSING: INDEPENDENT
GROCERY_SHOPPING: INDEPENDENT
MANAGING_FINANCES: INDEPENDENT

## 2025-04-16 ASSESSMENT — PATIENT HEALTH QUESTIONNAIRE - PHQ9: 1. LITTLE INTEREST OR PLEASURE IN DOING THINGS: NOT AT ALL

## 2025-04-16 NOTE — PROGRESS NOTES
Subjective   Patient ID: Mao Ordoñez is a 74 y.o. male who presents for Medicare Annual Wellness Visit Subsequent and Follow-up (Hypertension, Hyperlipidemia, and Hypothyroidism).    He presents for Annual Medicare Wellness Visit and follow-up on hypertension and hyperlipidemia. He has no chest pain, dyspnea, exertional chest pressure/discomfort, near-syncope, orthopnea, palpitations, paroxysmal nocturnal dyspnea, and syncope. Taking his medication regularly with no side effects. He states his blood pressure fluctuates frequently.    He presents for follow up of hypothyroidism. Current symptoms: fatigue. He has no diarrhea, heat / cold intolerance, nervousness, palpitations, or weight changes.     He states he gets fatigued during exertion and notes this began back in the fall after starting on Lupron and wonders if it is related to that.     He also presents today requesting steroid injection for allergic dermatitis.       Past Medical, Surgical, and Family History reviewed and updated in chart.    Reviewed all medications by prescribing practitioner or clinical pharmacist (such as prescriptions, OTCs, herbal therapies and supplements) and documented in the medical record.    Patient Self Assessment of Health Status  Patient Self Assessment: Good    Nutrition and Exercise  Current Diet: Heart Healthy Diet  Adequate Fluid Intake: Yes  Caffeine: Yes  Exercise Frequency: Infrequently    Functional Ability/Level of Safety  Cognitive Impairment Observed: No cognitive impairment observed  Cognitive Impairment Reported: No cognitive impairment reported by patient or family    Home Safety Risk Factors: None    Review of Systems   Constitutional:  Positive for fatigue. Negative for chills and fever.   HENT:  Negative for congestion, ear pain, nosebleeds, rhinorrhea and sore throat.    Respiratory:  Negative for cough, shortness of breath and wheezing.    Cardiovascular:  Negative for chest pain, palpitations and leg  "swelling.   Gastrointestinal:  Negative for abdominal pain, constipation, diarrhea and vomiting.   Endocrine: Negative for cold intolerance, heat intolerance, polydipsia, polyphagia and polyuria.   Genitourinary:  Negative for dysuria, frequency and hematuria.   Neurological:  Negative for dizziness, tremors, numbness and headaches.   Hematological:  Negative for adenopathy. Does not bruise/bleed easily.       Objective   /68   Pulse 95   Temp 36.3 °C (97.4 °F)   Resp 15   Ht 1.753 m (5' 9\")   Wt 88.5 kg (195 lb)   SpO2 97%   BMI 28.80 kg/m²     Physical Exam  Constitutional:       General: He is not in acute distress.     Appearance: Normal appearance.   HENT:      Head: Normocephalic and atraumatic.      Mouth/Throat:      Mouth: Mucous membranes are moist.      Pharynx: Oropharynx is clear. No oropharyngeal exudate or posterior oropharyngeal erythema.   Eyes:      General: No scleral icterus.     Extraocular Movements: Extraocular movements intact.      Pupils: Pupils are equal, round, and reactive to light.   Cardiovascular:      Rate and Rhythm: Normal rate and regular rhythm.      Pulses: Normal pulses.      Heart sounds: No murmur heard.     No friction rub. No gallop.   Pulmonary:      Effort: Pulmonary effort is normal.      Breath sounds: No wheezing, rhonchi or rales.   Musculoskeletal:      Cervical back: Normal range of motion and neck supple.   Skin:     General: Skin is warm.      Coloration: Skin is not jaundiced or pale.      Findings: No erythema or rash.   Neurological:      General: No focal deficit present.      Mental Status: He is alert and oriented to person, place, and time.      Cranial Nerves: No cranial nerve deficit.      Sensory: No sensory deficit.      Coordination: Coordination normal.      Gait: Gait normal.         Assessment/Plan   Problem List Items Addressed This Visit       Acquired hypothyroidism    Well-controlled, continue current medications and management.   " "      Relevant Medications    levothyroxine (Synthroid, Levoxyl) 100 mcg tablet    Allergic dermatitis    Follow-up if persistent or worsening symptoms otherwise when necessary.         Relevant Medications    methylPREDNISolone acetate (DEPO-Medrol) injection 80 mg (Completed)    Essential hypertension    Well-controlled, continue current medications and management.  Dietary sodium restriction.  Regular aerobic exercise program is recommended to help achieve and maintain normal body weight, fitness and improve lipid balance. .  Dietary changes: Increase soluble fiber  Plant sterols 2grams per day (e.g. Benecol)  Reduce saturated fat, \"trans\" monounsaturated fatty acids, and cholesterol         Mixed hyperlipidemia    The nature of cardiac risk has been fully discussed with this patient. Discussed cardiovascular risk analysis and appropriate diet with the need for lifelong measures to reduce the risk. A regular exercise program is recommended to help achieve and maintain normal body weight, fitness and improve lipid balance. Patient education provided. They understand and agree with this course of treatment. They will return with new or worsening symptoms. Patient instructed to remain current with appropriate annual health maintenance.          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  Continue established treatment plan and follow-up at least yearly.         Routine general medical examination at a health care facility - Primary    Recommend low-cholesterol diet, low-fat diet and low-salt diet.  The need for lifelong dietary compliance in order to reduce cardiac risk is recommended.  We will also recommend regular exercise program to improve lipid balance and overall health.  Recommend decreasing fat and cholesterol in diet, increasing aerobic exercise with a goal of 4 or more days per week          Scribe " Attestation  By signing my name below, I, Spike Trivedi   attest that this documentation has been prepared under the direction and in the presence of Shadi Hazel MD.

## 2025-04-30 ENCOUNTER — TELEPHONE (OUTPATIENT)
Dept: RADIATION ONCOLOGY | Facility: HOSPITAL | Age: 75
End: 2025-04-30
Payer: MEDICARE

## 2025-04-30 ASSESSMENT — ENCOUNTER SYMPTOMS
PALPITATIONS: 0
FATIGUE: 0
CONSTIPATION: 0
JOINT SWELLING: 0
HEMATURIA: 0
CHEST TIGHTNESS: 0
FEVER: 0
PSYCHIATRIC NEGATIVE: 1
ANAL BLEEDING: 0
RECTAL PAIN: 0
UNEXPECTED WEIGHT CHANGE: 0
DIFFICULTY URINATING: 0
BACK PAIN: 0
ALLERGIC/IMMUNOLOGIC NEGATIVE: 1
BLOOD IN STOOL: 0
WEAKNESS: 0
COUGH: 0
ABDOMINAL PAIN: 0
DIARRHEA: 0
ARTHRALGIAS: 0
FREQUENCY: 0
SHORTNESS OF BREATH: 0
DYSURIA: 0
DIZZINESS: 0

## 2025-04-30 NOTE — PROGRESS NOTES
Cancer synopsis:  Rad/onc: Dr. Milian/Shaq VERA    74 y.o. 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 Most recently underwent a biopsy  which demonstrated Yuko score 3+4=7, 11/16 positive cores.  MRI of the prostate was not completed. PSMA PET/CT Focally increased PSMA expression within the left prostate gland, consistent with prostate cancer. No metastatic disease present.    04/15/2024 st term lupron injection (plan for 6m)    VMAT prostate and SV 06/05/2024    History of presenting illness:    Patient ID: [id number]     Mao Ordoñez is a 74 y.o. male who presents for UIR prostate cancer GG2, [id number] IPSA 12.76    RT Site: prostate and SV  RT Date: 06/05/2024  Hormone therapy: Yes, st term (given 04/15/2024)  Hot Flushes: States very rarely has a hot flush  Fatigue: Denies  Bone pain: Denies  ED: Feels has just started coming back  - Quality of erections during the last 4 weeks: 1 = None at all  - Use of erectile dysfunction medications:  None  IPSS: N/a phone/virtual  Urinary symptoms: Denies dysuria, hematuria, frequency or urgency or urine leakage. Noted some mild stream weakening however not concerned.  Urinary Medications: No  Colonoscopy: 10/2014 unremarkable next due in 2025 d/t recent RT  Rectal bleeding: Denies  Other systems: Denies SOB, CP or fever. Does report 10lb weight gain during hormonal therapy.    Review of systems:  Review of Systems   Constitutional:  Negative for fatigue, fever and unexpected weight change.   Respiratory:  Negative for cough, chest tightness and shortness of breath.    Cardiovascular:  Negative for chest pain, palpitations and leg swelling.   Gastrointestinal:  Negative for abdominal pain, anal bleeding, blood in stool, constipation, diarrhea and rectal pain.   Endocrine: Negative for cold  intolerance, heat intolerance and polyuria.   Genitourinary:  Negative for decreased urine volume, difficulty urinating, dysuria, frequency, hematuria and urgency.   Musculoskeletal:  Negative for arthralgias, back pain, gait problem and joint swelling.   Skin: Negative.    Allergic/Immunologic: Negative.    Neurological:  Negative for dizziness, syncope and weakness.   Psychiatric/Behavioral: Negative.       PERFORMANCE STATUS:  KPS/ECO, Able to carry on normal activity; minor signs or symptoms of disease (ECOG equivalent 0)    Past Medical history  Past Medical History:   Diagnosis Date    Heart disease     Hyperlipidemia     Hypertension     Hypothyroidism       Surgical/family history  Family History   Problem Relation Name Age of Onset    Hypertension Mother      Hyperlipidemia Father      Hypothyroidism Father      Hyperlipidemia Other sibling     Hypothyroidism Other sibling       Past Surgical History:   Procedure Laterality Date    CORONARY ARTERY BYPASS GRAFT      5 vessels    CT ANGIO NECK  2023    CT NECK ANGIO W AND WO IV CONTRAST 2023    CT HEAD ANGIO W AND WO IV CONTRAST  2023    CT HEAD ANGIO W AND WO IV CONTRAST 2023    HERNIA REPAIR      MENISCECTOMY      OTHER SURGICAL HISTORY  2019    Bypass    OTHER SURGICAL HISTORY  2020    Hernia repair    OTHER SURGICAL HISTORY  2020    Vasectomy    OTHER SURGICAL HISTORY  2020    Colonoscopy      Social History  Tobacco Use: Low Risk  (2025)    Patient History     Smoking Tobacco Use: Never     Smokeless Tobacco Use: Never     Passive Exposure: Never       Current med list:  Current Outpatient Medications   Medication Instructions    aspirin 162 mg, Once    atorvastatin (LIPITOR) 80 mg, Daily    benzonatate (TESSALON) 200 mg, oral, 3 times daily PRN, Do not crush or chew.    ezetimibe (ZETIA) 10 mg, Daily    levothyroxine (SYNTHROID, LEVOXYL) 100 mcg, oral, Daily    lisinopril 20 mg, 2 times daily     loratadine (Claritin) 10 mg tablet 1 tablet, Daily    Lupron Depot (6 Month) 45 mg, Once    metoprolol succinate XL (TOPROL-XL) 25 mg, Daily    neomycin-polymyxin-dexAMETHasone (Maxitrol) 3.5mg/mL-10,000 unit/mL-0.1 % ophthalmic suspension USE 1 DROP IN THE LEFT EYE FOUR TIMES DAILY FOR 7 DAYS.    nitroglycerin (NITROSTAT) 0.4 mg, As needed    ranolazine (RANEXA) 500 mg, 2 times daily    tadalafil 20 mg, oral, Every 72 hours PRN, Maximum 1 tablet in 3 days    tamsulosin (FLOMAX) 0.4 mg, oral, Daily    triamcinolone acetonide 0.025 % lotion topical (top), 2 times daily      Last recorded vital:  N/a phone/virtual    Physical exam  N/a phone/virtual    Pertinent labs:  Prostate Specific AG   Date/Time Value Ref Range Status   12/30/2024 10:48 AM <0.01 <=4.00 ng/mL Final     Testosterone   Date/Time Value Ref Range Status   12/30/2024 10:48 AM <30 (L) 240 - 1,000 ng/dL Final     Comment:     Testing by a more sensitive method (Testosterone Total LC-MS/MS)is recommended for accurate quantification of Testosterone levels less than 60 ng/dL.      Dx:  Problem List Items Addressed This Visit    None  Due for labs today will call with results.  Review of latent SE including rectal bleeding, hematuria, urinary strictures, ED where reviewed as well as how to contact office if s/s present. Denies latent SE. NCCN guidelines where reviewed and routine FUV of every 3m for first year and every 6m for four years for a total of five years was discussed. Patient verbalized understanding.      PLAN:  FUV 4m  Labs Psa and testosterone in 4m  Imaging none  Screening: next colonoscopy 10/2025  FUV other providers: PCP for routine evals    Please contact office with any concerns:  [facility]  [phone]    I performed this visit using realtime telehealth tools, including an audio/video OR telephone connection between patient’s name and location Mao Ordoñez and Benjamin FITZGERALDJASKARAN.    15 minutes in face to face  communication and 10 minutes in chart review and charting    2. POS 10: Telehealth provided in patient's home.  o Patient is located in their home (which is a location other than a hospital or other facility where the patient receives care in a private residence) when receiving health services or health related services through telecommunication technology.

## 2025-05-01 ENCOUNTER — HOSPITAL ENCOUNTER (OUTPATIENT)
Dept: RADIATION ONCOLOGY | Facility: HOSPITAL | Age: 75
Setting detail: RADIATION/ONCOLOGY SERIES
Discharge: HOME | End: 2025-05-01
Payer: MEDICARE

## 2025-05-01 DIAGNOSIS — C61 MALIGNANT NEOPLASM OF PROSTATE (MULTI): Primary | ICD-10-CM

## 2025-05-01 PROCEDURE — 99214 OFFICE O/P EST MOD 30 MIN: CPT | Mod: 95

## 2025-05-01 PROCEDURE — 99214 OFFICE O/P EST MOD 30 MIN: CPT

## 2025-05-06 ENCOUNTER — LAB (OUTPATIENT)
Dept: LAB | Facility: HOSPITAL | Age: 75
End: 2025-05-06
Payer: MEDICARE

## 2025-05-06 DIAGNOSIS — C61 MALIGNANT NEOPLASM OF PROSTATE (MULTI): Primary | ICD-10-CM

## 2025-05-06 LAB
PSA SERPL-MCNC: 0.16 NG/ML
TESTOST SERPL-MCNC: 318 NG/DL (ref 240–1000)

## 2025-05-06 PROCEDURE — 84403 ASSAY OF TOTAL TESTOSTERONE: CPT

## 2025-05-06 PROCEDURE — 84153 ASSAY OF PSA TOTAL: CPT

## 2025-07-17 LAB
ALANINE AMINOTRANSFERASE (SGPT) (U/L) IN SER/PLAS: 35 U/L
ALKALINE PHOSPHATASE (U/L) IN SER/PLAS: 63 U/L
ASPARTATE AMINOTRANSFERASE (SGOT) (U/L) IN SER/PLAS: 27 U/L
BILIRUBIN, TOTAL  (MG/DL) IN SER/PLAS: 0.5 MG/DL (ref 0–1.2)
CHOLESTEROL (MG/DL) IN SER/PLAS: 113 MG/DL
CREATININE (MG/DL) IN SER/PLAS: 1 MG/DL
GLUCOSE: 89 MG/DL
HDL-CHOLESTEROL (MG/DL) IN SER/PLAS: 29 MG/DL
HEMATOCRIT (%) IN BLOOD BY AUTOMATED COUNT: 36.5 % (ref 41–52)
HEMOGLOBIN (G/DL) IN BLOOD: 12.2 G/DL (ref 13.5–17.5)
HEMOGLOBIN A1C/HEMOGLOBIN TOTAL IN BLOOD: 5.9 %
LDL CHOLESTEROL: 68 MG/DL
LEUKOCYTES (10*3/UL) IN BLOOD BY AUTOMATED COUNT: 7 X10*3/UL (ref 4.4–11.3)
PLATELETS (10*3/UL) IN BLOOD AUTOMATED COUNT: 159 X10*3/UL (ref 150–450)
POTASSIUM (MMOL/L) IN SER/PLAS: 4.6 MMOL/L
SODIUM (MMOL/L) IN SER/PLAS: 138 MMOL/L
TRIGLYCERIDES  (MG/DL) IN SER/PLAS: 67 MG/DL
UREA NITROGEN (MG/DL) IN SER/PLAS: 18 MG/DL

## 2025-08-07 ENCOUNTER — APPOINTMENT (OUTPATIENT)
Dept: LAB | Facility: HOSPITAL | Age: 75
End: 2025-08-07
Payer: MEDICARE

## 2025-08-07 ENCOUNTER — LAB (OUTPATIENT)
Dept: LAB | Facility: HOSPITAL | Age: 75
End: 2025-08-07
Payer: MEDICARE

## 2025-08-07 DIAGNOSIS — C61 MALIGNANT NEOPLASM OF PROSTATE (MULTI): ICD-10-CM

## 2025-08-07 DIAGNOSIS — C61 MALIGNANT NEOPLASM OF PROSTATE (MULTI): Primary | ICD-10-CM

## 2025-08-07 PROCEDURE — 84153 ASSAY OF PSA TOTAL: CPT

## 2025-08-07 PROCEDURE — 84403 ASSAY OF TOTAL TESTOSTERONE: CPT

## 2025-08-08 LAB
PSA SERPL-MCNC: 0.13 NG/ML
TESTOST SERPL-MCNC: 282 NG/DL (ref 240–1000)

## 2025-08-13 ENCOUNTER — TELEPHONE (OUTPATIENT)
Dept: RADIATION ONCOLOGY | Facility: HOSPITAL | Age: 75
End: 2025-08-13
Payer: MEDICARE

## 2025-08-13 ASSESSMENT — ENCOUNTER SYMPTOMS
ABDOMINAL PAIN: 0
DYSURIA: 0
PALPITATIONS: 0
CONSTIPATION: 0
ARTHRALGIAS: 0
DIZZINESS: 0
CHEST TIGHTNESS: 0
ALLERGIC/IMMUNOLOGIC NEGATIVE: 1
UNEXPECTED WEIGHT CHANGE: 0
COUGH: 0
DIFFICULTY URINATING: 0
WEAKNESS: 0
ANAL BLEEDING: 0
JOINT SWELLING: 0
BLOOD IN STOOL: 0
DIARRHEA: 0
FATIGUE: 0
PSYCHIATRIC NEGATIVE: 1
BACK PAIN: 0
FREQUENCY: 0
FEVER: 0
SHORTNESS OF BREATH: 0
RECTAL PAIN: 0
HEMATURIA: 0

## 2025-08-14 ENCOUNTER — HOSPITAL ENCOUNTER (OUTPATIENT)
Dept: RADIATION ONCOLOGY | Facility: HOSPITAL | Age: 75
Setting detail: RADIATION/ONCOLOGY SERIES
Discharge: HOME | End: 2025-08-14
Payer: MEDICARE

## 2025-08-14 DIAGNOSIS — C61 MALIGNANT NEOPLASM OF PROSTATE (MULTI): Primary | ICD-10-CM

## 2025-08-14 PROCEDURE — 99213 OFFICE O/P EST LOW 20 MIN: CPT | Mod: 95

## 2025-08-14 PROCEDURE — 99213 OFFICE O/P EST LOW 20 MIN: CPT

## (undated) DEVICE — GLOVE, SURGICAL, PROTEXIS PI , 8.0, PF, LF

## (undated) DEVICE — GOWN, ASTOUND, L

## (undated) DEVICE — GLOVE, SURGICAL, PROTEXIS MICRO, 8.5, PF, LATEX

## (undated) DEVICE — DRESSING, NON-ADHERENT, TELFA, OUCHLESS, 3 X 8 IN, STERILE

## (undated) DEVICE — COVER, TABLE, 44X90

## (undated) DEVICE — TOWEL PACK, STERILE, 4/PACK, BLUE

## (undated) DEVICE — COVER, TABLE

## (undated) DEVICE — SYSTEM, SPACEOAR VUE, HYDROGEL

## (undated) DEVICE — APPLICATOR, PREP, CHLORAPREP, W/ORANGE TINT, 10.5ML

## (undated) DEVICE — SYRINGE, 35 CC, LUER LOCK, MONOJECT, LF

## (undated) DEVICE — NEEDLE, SPINAL, LONG, 22 G X 5 IN, BLACK HUB

## (undated) DEVICE — GOWN, ASTOUND, XXL

## (undated) DEVICE — DRAPE, LEGGINGS, 28.5 X 43 IN, DISPOSABLE, LF, STERILE

## (undated) DEVICE — PAD, SANITARY, MAXI, U BY KOTEX, REG, LF

## (undated) DEVICE — PANTY, MESH, LARGE

## (undated) DEVICE — SPONGE, GAUZE, RADIOPAQUE, 12 PLY, 4 X 8 IN, STERILE, LF

## (undated) DEVICE — SPONGE, GAUZE, XRAY DECT, 16 PLY, 4 X 4, W/MASTER DMT,STERILE

## (undated) DEVICE — LABELS, OR GENERAL, W/MARKER

## (undated) DEVICE — MARKER, SKIN, REGULAR TIP, W/W/FLEXI RULER, LABEL